# Patient Record
Sex: FEMALE | Race: WHITE | Employment: OTHER | URBAN - METROPOLITAN AREA
[De-identification: names, ages, dates, MRNs, and addresses within clinical notes are randomized per-mention and may not be internally consistent; named-entity substitution may affect disease eponyms.]

---

## 2017-10-03 ENCOUNTER — HOSPITAL ENCOUNTER (OUTPATIENT)
Dept: MAMMOGRAPHY | Facility: CLINIC | Age: 57
Discharge: HOME | End: 2017-10-03
Attending: OBSTETRICS & GYNECOLOGY | Admitting: OBSTETRICS & GYNECOLOGY
Payer: COMMERCIAL

## 2017-10-03 DIAGNOSIS — Z12.31 VISIT FOR SCREENING MAMMOGRAM: ICD-10-CM

## 2017-10-03 PROCEDURE — 77063 BREAST TOMOSYNTHESIS BI: CPT

## 2017-10-10 ENCOUNTER — OFFICE VISIT (OUTPATIENT)
Dept: FAMILY MEDICINE | Facility: CLINIC | Age: 57
End: 2017-10-10

## 2017-10-10 VITALS
HEART RATE: 64 BPM | HEIGHT: 64 IN | TEMPERATURE: 98.1 F | RESPIRATION RATE: 20 BRPM | WEIGHT: 149.8 LBS | SYSTOLIC BLOOD PRESSURE: 126 MMHG | BODY MASS INDEX: 25.57 KG/M2 | DIASTOLIC BLOOD PRESSURE: 78 MMHG

## 2017-10-10 DIAGNOSIS — Z11.59 NEED FOR HEPATITIS C SCREENING TEST: ICD-10-CM

## 2017-10-10 DIAGNOSIS — Z23 NEED FOR VACCINATION: ICD-10-CM

## 2017-10-10 DIAGNOSIS — Z00.8 ENCOUNTER FOR BIOMETRIC SCREENING: Primary | ICD-10-CM

## 2017-10-10 PROCEDURE — 99213 OFFICE O/P EST LOW 20 MIN: CPT | Mod: 25 | Performed by: PHYSICIAN ASSISTANT

## 2017-10-10 PROCEDURE — 90686 IIV4 VACC NO PRSV 0.5 ML IM: CPT | Performed by: PHYSICIAN ASSISTANT

## 2017-10-10 PROCEDURE — 80061 LIPID PANEL: CPT | Mod: 90 | Performed by: PHYSICIAN ASSISTANT

## 2017-10-10 PROCEDURE — 36415 COLL VENOUS BLD VENIPUNCTURE: CPT | Performed by: PHYSICIAN ASSISTANT

## 2017-10-10 PROCEDURE — 86803 HEPATITIS C AB TEST: CPT | Mod: 90 | Performed by: PHYSICIAN ASSISTANT

## 2017-10-10 PROCEDURE — 80053 COMPREHEN METABOLIC PANEL: CPT | Mod: 90 | Performed by: PHYSICIAN ASSISTANT

## 2017-10-10 PROCEDURE — 90471 IMMUNIZATION ADMIN: CPT | Performed by: PHYSICIAN ASSISTANT

## 2017-10-10 RX ORDER — ESTRADIOL 1 MG/1
0.5 TABLET ORAL DAILY
COMMUNITY
Start: 2017-08-20 | End: 2019-07-25

## 2017-10-10 NOTE — PROGRESS NOTES
"CC: Biometric    History:  Cindy is here today for a biometric screening. Feels that she is doing well, but admits she has gained 10 pounds over the past year. Has been struggling with plantar fasciitis which has made her less active. [place] and has been doing more social activities with bad diet practices. No chest pain, SOB, palpitations, dizziness, headaches.     PMH, MEDICATIONS, ALLERGIES, SOCIAL AND FAMILY HISTORY in EPIC and reviewed by me personally.      ROS negative other than the symptoms noted above in the HPI.        Examination   /78 (BP Location: Left arm, Patient Position: Chair, Cuff Size: Adult Regular)  Pulse 64  Temp 98.1  F (36.7  C) (Oral)  Resp 20  Ht 1.632 m (5' 4.25\")  Wt 67.9 kg (149 lb 12.8 oz)  Breastfeeding? No  BMI 25.51 kg/m2       Constitutional: Sitting comfortably, in no acute distress. Vital signs noted  Cardiovascular:  regular rate and rhythm, no murmurs, clicks, or gallops  Respiratory:  normal respiratory rate and rhythm, lungs clear to auscultation        A/P    ICD-10-CM    1. Encounter for biometric screening Z01.89 VENOUS COLLECTION     Lipid Profile (QUEST)     Comprehensive metabolic panel   2. Need for hepatitis C screening test Z11.59 Hepatits C antibody (QUEST)       DISCUSSION:Cindy is doing well today. Will check fasting labs, as well as Hepatitis C. Encouraged her to lose the 10 pounds that she gained. Will contact her via [employer] with results once available.     follow up visit: As needed    Marce Antony PA-C  [facility]    "

## 2017-10-10 NOTE — NURSING NOTE
Cindy Mota is here for fasting blood work for her biometric screening.  Questioned patient about current smoking habits.  Pt. has never smoked.  PULSE regular  My Chart: active  CLASSIFICATION OF OVERWEIGHT AND OBESITY BY BMI                        Obesity Class           BMI(kg/m2)  Underweight                                    < 18.5  Normal                                         18.5-24.9  Overweight                                     25.0-29.9  OBESITY                     I                  30.0-34.9                             II                 35.0-39.9  EXTREME OBESITY             III                >40                            Patient's  BMI Body mass index is 25.51 kg/(m^2).  http://hin.nhlbi.nih.gov/menuplanner/menu.cgi  Pre-visit planning  Immunizations - up to date  Colonoscopy - is up to date  Mammogram - is up to date  Asthma -   PHQ9 -    ANDREW-7 -

## 2017-10-10 NOTE — MR AVS SNAPSHOT
After Visit Summary   10/10/2017    Cindy Mota    MRN: [id number]           Patient Information     Date Of Birth          1960        Visit Information        Provider Department      10/10/2017 9:00 AM Marce Antony PA-C [facility]        Today's Diagnoses     Encounter for biometric screening    -  1    Need for hepatitis C screening test           Follow-ups after your visit        Follow-up notes from your care team     Return if symptoms worsen or fail to improve.      Who to contact     If you have questions or need follow up information about today's clinic visit or your schedule please contact [facility] directly at [phone].  Normal or non-critical lab and imaging results will be communicated to you by Spotlight.fmhart, letter or phone within 4 business days after the clinic has received the results. If you do not hear from us within 7 days, please contact the clinic through Spot[employer]fm[employer] or phone. If you have a critical or abnormal lab result, we will notify you by phone as soon as possible.  Submit refill requests through [employer] or call your pharmacy and they will forward the refill request to us. Please allow 3 business days for your refill to be completed.          Additional Information About Your Visit        MyChart Information     [employer] gives you secure access to your electronic health record. If you see a primary care provider, you can also send messages to your care team and make appointments. If you have questions, please call your primary care clinic.  If you do not have a primary care provider, please call [phone] and they will assist you.        Care EveryWhere ID     This is your Care EveryWhere ID. This could be used by other organizations to access your [place] medical records  [id number]        Your Vitals Were     Pulse Temperature Respirations Height Breastfeeding? BMI (Body Mass Index)    64 98.1  F  "(36.7  C) (Oral) 20 1.632 m (5' 4.25\") No 25.51 kg/m2       Blood Pressure from Last 3 Encounters:   10/10/17 126/78   09/06/16 110/70   08/30/16 110/70    Weight from Last 3 Encounters:   10/10/17 67.9 kg (149 lb 12.8 oz)   09/06/16 63 kg (139 lb)   08/30/16 62.1 kg (137 lb)              We Performed the Following     Comprehensive metabolic panel     Hepatits C antibody (QUEST)     Lipid Profile (QUEST)     VENOUS COLLECTION        Primary Care Provider Office Phone # Fax #    Mayela Loredo [phone] [address]  [address] [place] [id number]        Equal Access to Services     CARLOS VERGARA : Hadii madeline conklin hadasho Soomaali, waaxda luqadaha, qaybta kaalmada adeegyada, hui cui . So [facility] [phone].    ATENCIÓN: Si habla español, tiene a dueñas disposición servicios gratuitos de asistencia lingüística. Llame al [phone].    We comply with applicable federal civil rights laws and [place] laws. We do not discriminate on the basis of race, color, national origin, age, disability, sex, sexual orientation, or gender identity.            Thank you!     Thank you for choosing [facility]  for your care. Our goal is always to provide you with excellent care. Hearing back from our patients is one way we can continue to improve our services. Please take a few minutes to complete the written survey that you may receive in the mail after your visit with us. Thank you!             Your Updated Medication List - Protect others around you: Learn how to safely use, store and throw away your medicines at www.disposemymeds.org.          This list is accurate as of: 10/10/17  9:42 AM.  Always use your most recent med list.                   Brand Name Dispense Instructions for use Diagnosis    estradiol 1 MG tablet    ESTRACE     Take 0.5 mg by mouth daily          "

## 2017-10-11 LAB
ALBUMIN SERPL-MCNC: 4.2 G/DL (ref 3.6–5.1)
ALBUMIN/GLOB SERPL: 1.8 (CALC) (ref 1–2.5)
ALP SERPL-CCNC: 51 U/L (ref 33–130)
ALT SERPL-CCNC: 14 U/L (ref 6–29)
AST SERPL-CCNC: 19 U/L (ref 10–35)
BILIRUB SERPL-MCNC: 0.6 MG/DL (ref 0.2–1.2)
BUN SERPL-MCNC: 12 MG/DL (ref 7–25)
BUN/CREATININE RATIO: NORMAL (CALC) (ref 6–22)
CALCIUM SERPL-MCNC: 9.2 MG/DL (ref 8.6–10.4)
CHLORIDE SERPLBLD-SCNC: 106 MMOL/L (ref 98–110)
CHOLEST SERPL-MCNC: 207 MG/DL
CHOLEST/HDLC SERPL: 2.2 (CALC)
CO2 SERPL-SCNC: 25 MMOL/L (ref 20–31)
CREAT SERPL-MCNC: 0.66 MG/DL (ref 0.5–1.05)
EGFR AFRICAN AMERICAN - QUEST: 114 ML/MIN/1.73M2
GFR SERPL CREATININE-BSD FRML MDRD: 99 ML/MIN/1.73M2
GLOBULIN, CALCULATED - QUEST: 2.3 G/DL (CALC) (ref 1.9–3.7)
GLUCOSE - QUEST: 95 MG/DL (ref 65–99)
HCV AB - QUEST: NORMAL
HDLC SERPL-MCNC: 95 MG/DL
LDLC SERPL CALC-MCNC: 96 MG/DL (CALC)
NONHDLC SERPL-MCNC: 112 MG/DL (CALC)
POTASSIUM SERPL-SCNC: 4.1 MMOL/L (ref 3.5–5.3)
PROT SERPL-MCNC: 6.5 G/DL (ref 6.1–8.1)
SIGNAL TO CUT OFF - QUEST: 0.01
SODIUM SERPL-SCNC: 141 MMOL/L (ref 135–146)
TRIGL SERPL-MCNC: 72 MG/DL

## 2018-04-06 ENCOUNTER — OFFICE VISIT (OUTPATIENT)
Dept: FAMILY MEDICINE | Facility: CLINIC | Age: 58
End: 2018-04-06

## 2018-04-06 VITALS
RESPIRATION RATE: 20 BRPM | HEART RATE: 72 BPM | DIASTOLIC BLOOD PRESSURE: 66 MMHG | HEIGHT: 64 IN | TEMPERATURE: 98.1 F | SYSTOLIC BLOOD PRESSURE: 126 MMHG | WEIGHT: 153 LBS | BODY MASS INDEX: 26.12 KG/M2

## 2018-04-06 DIAGNOSIS — J01.90 ACUTE SINUSITIS WITH SYMPTOMS > 10 DAYS: ICD-10-CM

## 2018-04-06 DIAGNOSIS — N30.00 ACUTE CYSTITIS WITHOUT HEMATURIA: Primary | ICD-10-CM

## 2018-04-06 LAB
ALBUMIN (URINE): ABNORMAL MG/DL
APPEARANCE UR: ABNORMAL
BACTERIA, UR: ABNORMAL
BILIRUB UR QL: ABNORMAL
CASTS/LPF: ABNORMAL
COLOR UR: YELLOW
EP/HPF: ABNORMAL
GLUCOSE URINE: ABNORMAL MG/DL
HGB UR QL: ABNORMAL
KETONES UR QL: ABNORMAL MG/DL
LEUKOCYTE ESTERASE - QUEST: ABNORMAL
MISC.: ABNORMAL
NITRITE UR QL STRIP: ABNORMAL
PH UR STRIP: 7 PH (ref 5–7)
RBC, UR MICRO: ABNORMAL (ref ?–2)
SP. GRAVITY: 1.01
UROBILINOGEN UR QL STRIP: 0.2 EU/DL (ref 0.2–1)
WBC, UR MICRO: ABNORMAL (ref ?–2)

## 2018-04-06 PROCEDURE — 81001 URINALYSIS AUTO W/SCOPE: CPT | Performed by: PHYSICIAN ASSISTANT

## 2018-04-06 PROCEDURE — 99213 OFFICE O/P EST LOW 20 MIN: CPT | Performed by: PHYSICIAN ASSISTANT

## 2018-04-06 RX ORDER — NITROFURANTOIN 25; 75 MG/1; MG/1
100 CAPSULE ORAL 2 TIMES DAILY
Qty: 14 CAPSULE | Refills: 0 | Status: SHIPPED | OUTPATIENT
Start: 2018-04-06 | End: 2018-10-08

## 2018-04-06 NOTE — MR AVS SNAPSHOT
"              After Visit Summary   4/6/2018    Cindy Mota    MRN: [id number]           Patient Information     Date Of Birth          1960        Visit Information        Provider Department      4/6/2018 1:00 PM Aurelia Champion PA [facility]        Today's Diagnoses     Acute cystitis without hematuria    -  1    Acute sinusitis with symptoms > 10 days           Follow-ups after your visit        Who to contact     If you have questions or need follow up information about today's clinic visit or your schedule please contact [facility] directly at [phone].  Normal or non-critical lab and imaging results will be communicated to you by MyChart, letter or phone within 4 business days after the clinic has received the results. If you do not hear from us within 7 days, please contact the clinic through [employer] or phone. If you have a critical or abnormal lab result, we will notify you by phone as soon as possible.  Submit refill requests through [employer] or call your pharmacy and they will forward the refill request to us. Please allow 3 business days for your refill to be completed.          Additional Information About Your Visit        MyChart Information     [employer]uel gives you secure access to your electronic health record. If you see a primary care provider, you can also send messages to your care team and make appointments. If you have questions, please call your primary care clinic.  If you do not have a primary care provider, please call [phone] and they will assist you.        Care EveryWhere ID     This is your Care EveryWhere ID. This could be used by other organizations to access your [place] medical records  [id number]        Your Vitals Were     Pulse Temperature Respirations Height Last Period Breastfeeding?    72 98.1  F (36.7  C) (Oral) 20 1.632 m (5' 4.25\") (LMP Unknown) No    BMI (Body Mass Index)                   " 26.06 kg/m2            Blood Pressure from Last 3 Encounters:   04/06/18 126/66   10/10/17 126/78   09/06/16 110/70    Weight from Last 3 Encounters:   04/06/18 69.4 kg (153 lb)   10/10/17 67.9 kg (149 lb 12.8 oz)   09/06/16 63 kg (139 lb)              We Performed the Following     HCL  Urinalysis, Routine (BFP)          Today's Medication Changes          These changes are accurate as of 4/6/18  2:04 PM.  If you have any questions, ask your nurse or doctor.               Start taking these medicines.        Dose/Directions    nitroFURantoin (macrocrystal-monohydrate) 100 MG capsule   Commonly known as:  MACROBID   Used for:  Acute cystitis without hematuria   Started by:  Aurelia Champion PA        Dose:  100 mg   Take 1 capsule (100 mg) by mouth 2 times daily   Quantity:  14 capsule   Refills:  0            Where to get your medicines      These medications were sent to [facility] [id number] [facility] [address] [facility] [address]  [address] [facility] [address] [facility] [address]     Phone:  [phone]     nitroFURantoin (macrocrystal-monohydrate) 100 MG capsule                Primary Care Provider Office Phone # Fax #    Mayela Loredo [phone] [address] [address]        Equal Access to Services     [facility]: Hadii madeline conklin hadasho Sogallo, waaxda luqadaha, qaybta kaalmada adetierayada, hui cui . So [facility] [phone].    ATENCIÓN: Si habla español, tiene a dueñas disposición servicios gratuitos de asistencia lingüística. Llame al [phone].    We comply with applicable federal civil rights laws and [place] laws. We do not discriminate on the basis of race, color, national origin, age, disability, sex, sexual orientation, or gender identity.            Thank you!     Thank you for choosing [facility]  for your care. Our goal is always to provide you with excellent care. Hearing back from our patients  is one way we can continue to improve our services. Please take a few minutes to complete the written survey that you may receive in the mail after your visit with us. Thank you!             Your Updated Medication List - Protect others around you: Learn how to safely use, store and throw away your medicines at www.disposemymeds.org.          This list is accurate as of 4/6/18  2:04 PM.  Always use your most recent med list.                   Brand Name Dispense Instructions for use Diagnosis    estradiol 1 MG tablet    ESTRACE     Take 0.5 mg by mouth daily        nitroFURantoin (macrocrystal-monohydrate) 100 MG capsule    MACROBID    14 capsule    Take 1 capsule (100 mg) by mouth 2 times daily    Acute cystitis without hematuria

## 2018-04-06 NOTE — PROGRESS NOTES
"Chief Complaint   Patient presents with     UTI     SUBJECTIVE  Cindy Mota in for a possible UTI.  Symptoms started 2 day(s) ago. Symptoms include  dysuria and cloudiness  She denies back pain, nausea, vomiting and fever. She does not have a history of uti's.  She denies any unusual vaginal discharge or odor.     She is sexually active.     Recent abx use? No  Flank pain?  No    OTC treatment? Ibuprofen    Other concerns: 2 weeks of URI and sinus sx.  Sinus congestion improving but today has frontal sinus pain  OTC has been taking Sudafed  Denies fevers    Current Outpatient Prescriptions   Medication     nitroFURantoin, macrocrystal-monohydrate, (MACROBID) 100 MG capsule     estradiol (ESTRACE) 1 MG tablet     No current facility-administered medications for this visit.        Patient Active Problem List    Diagnosis Date Noted     ACP (advance care planning) 06/04/2012     Priority: Medium     Advance Care Planning 5/6/2016: ACP Review of Chart / Resources Provided:  Reviewed chart for advance care plan.  Cindy Mota has no plan or code status on file. Discussed available resources and provided with information. Confirmed code status reflects current choices pending further ACP discussions.  Confirmed/documented legally designated decision makers.  Added by Humera Gaitan                   Symptomatic menopausal or female climacteric states 06/04/2012     Priority: Medium     Osteoarthritis (arthritis due to wear and tear of joints) 06/04/2012     Priority: Medium     Family history of first degree relative with dementia 06/04/2012     Priority: Medium     Family history of cardiovascular disease 06/04/2012     Priority: Medium     CARDIOVASCULAR SCREENING; LDL GOAL LESS THAN 160 10/31/2010     Priority: Medium     Health Care Home 11/26/2013     Priority: Low       OBJECTIVE:    /66  Pulse 72  Temp 98.1  F (36.7  C) (Oral)  Resp 20  Ht 1.632 m (5' 4.25\")  Wt 69.4 kg (153 lb)  LMP  (LMP " Unknown)  Breastfeeding? No  BMI 26.06 kg/m2    Patient is a 58 year old female, in no acute distress. Vitals noted   Head: Normocephalic, atraumatic.  Eyes: Conjunctiva clear, no discharge  Ears: External ears and TMs normal BL.  Nose: Nasal mucosa pink and moist. No discharge.  Mouth / Throat: Mucous membranes moist. Normal dentition.  Pharynx non-erythematous, no exudates.   Neck: Supple, No thyromegaly or nodules. No lymphadenopathy.  Cardiac:  Normal rhythm and rate, no murmurs, rubs or gallops.  Lungs: clear to auscultation bilaterally; no wheezes, crackles or rhonchi  Abdomen:  Bowel sounds normal. Soft,  not distended, no masses, no hepatosplenomegaly, non-tender.  There is not CVA tenderness.    Urinalysis:  Labs Resulted Today:   Results for orders placed or performed in visit on 04/06/18   HCL  Urinalysis, Routine (BFP)   Result Value Ref Range    Color Urine Yellow     Appearance Urine Cloudy     Glucose Urine Neg neg mg/dL    Bilirubin Urine Neg neg    Ketones Urine Neg neg mg/dL    Specific Gravity 1.015     Blood Urine Small (A) neg    pH Urine 7.0 5.0 - 7.0 pH    Albumin Urine Neg neg - neg mg/dL    Urobilinogen Urine 0.2 0.2 - 1.0 EU/dL    Nitrite Urine Neg NEG    Leukocyte Esterase Large (A) neg - neg    Wbc, Urine Micro 20-30 (A) neg - 2    RBC Micro Urine 3-7 (A) neg - 2    EP/HPF Mod     Bacteria Urine Large (A) neg - neg    Casts/LPF Neg     Miscellaneous Neg          Culture pending?  No      ASSESSMENT/PLAN:      (N30.00) Acute cystitis without hematuria  (primary encounter diagnosis)  Plan: HCL  Urinalysis, Routine (BFP), nitroFURantoin,        macrocrystal-monohydrate, (MACROBID) 100 MG         capsule    Macrobid 100 mg bid x 7 days.  AE of the headache, GI disturbance, loss of appetite and change in urine color discussed.    Push fluids, frequent voiding, acetominophen/ibuprofen prn as tolerated.  Call or return to clinic prn if these symptoms worsen or fail to improve as  anticipated.  Repeat UA indicated: No      (J01.90) Acute sinusitis with symptoms > 10 days  Pt to continue Sudafed, Add Vicks  I did offer to rx abx to cover for sinus and UTI, she declined  Will call me in a couple weeks if sinus sx continue.      Cindy Mota understands and agrees with the plan.  Aurelia Champion

## 2018-04-12 ENCOUNTER — MYC MEDICAL ADVICE (OUTPATIENT)
Dept: FAMILY MEDICINE | Facility: CLINIC | Age: 58
End: 2018-04-12

## 2018-04-12 DIAGNOSIS — N30.00 ACUTE CYSTITIS WITHOUT HEMATURIA: Primary | ICD-10-CM

## 2018-04-12 PROCEDURE — 87086 URINE CULTURE/COLONY COUNT: CPT | Performed by: PHYSICIAN ASSISTANT

## 2018-04-12 PROCEDURE — 81001 URINALYSIS AUTO W/SCOPE: CPT | Performed by: PHYSICIAN ASSISTANT

## 2018-04-13 DIAGNOSIS — R30.0 DYSURIA: Primary | ICD-10-CM

## 2018-04-13 LAB
ALBUMIN (URINE): ABNORMAL MG/DL
APPEARANCE UR: ABNORMAL
BACTERIA, UR: ABNORMAL
BILIRUB UR QL: ABNORMAL
CASTS/LPF: ABNORMAL
COLOR UR: YELLOW
EP/HPF: ABNORMAL
GLUCOSE URINE: ABNORMAL MG/DL
HGB UR QL: ABNORMAL
KETONES UR QL: ABNORMAL MG/DL
LEUKOCYTE ESTERASE - QUEST: ABNORMAL
MISC.: ABNORMAL
NITRITE UR QL STRIP: ABNORMAL
PH UR STRIP: 5.5 PH (ref 5–7)
RBC, UR MICRO: ABNORMAL (ref ?–2)
SP. GRAVITY: 1.02
UROBILINOGEN UR QL STRIP: 0.2 EU/DL (ref 0.2–1)
WBC, UR MICRO: ABNORMAL (ref ?–2)

## 2018-04-13 RX ORDER — CIPROFLOXACIN 500 MG/1
500 TABLET, FILM COATED ORAL 2 TIMES DAILY
Qty: 14 TABLET | Refills: 0 | Status: SHIPPED | OUTPATIENT
Start: 2018-04-13 | End: 2018-10-08

## 2018-04-13 NOTE — TELEPHONE ENCOUNTER
See UA    ordered   Switch to Cipro  Black box warning advised  Push water    Aurelia Champion PA-C  4/13/2018

## 2018-04-13 NOTE — TELEPHONE ENCOUNTER
From: Cindy Mota  To: Aurelia Champion PA  Sent: 4/12/2018 9:31 PM CDT  Subject: A follow-up question for a visit within the past seven days    David Moses -     I am still suspicious that the UTI that I saw you about last week is still hanging around. Should I stop by on Friday and leave another specimen?    Cindy

## 2018-04-15 LAB — URINE VOIDED CULTURE: NORMAL

## 2018-10-08 ENCOUNTER — OFFICE VISIT (OUTPATIENT)
Dept: FAMILY MEDICINE | Facility: CLINIC | Age: 58
End: 2018-10-08

## 2018-10-08 VITALS
SYSTOLIC BLOOD PRESSURE: 124 MMHG | BODY MASS INDEX: 25.06 KG/M2 | HEART RATE: 87 BPM | DIASTOLIC BLOOD PRESSURE: 76 MMHG | HEIGHT: 65 IN | WEIGHT: 150.4 LBS | OXYGEN SATURATION: 98 % | RESPIRATION RATE: 16 BRPM | TEMPERATURE: 97.3 F

## 2018-10-08 DIAGNOSIS — Z82.49 FAMILY HISTORY OF CARDIOVASCULAR DISEASE: ICD-10-CM

## 2018-10-08 DIAGNOSIS — Z23 NEED FOR VACCINATION: ICD-10-CM

## 2018-10-08 DIAGNOSIS — Z13.220 SCREENING CHOLESTEROL LEVEL: ICD-10-CM

## 2018-10-08 DIAGNOSIS — Z02.9 ADMINISTRATIVE ENCOUNTER: Primary | ICD-10-CM

## 2018-10-08 DIAGNOSIS — Z13.1 SCREENING FOR DIABETES MELLITUS: ICD-10-CM

## 2018-10-08 DIAGNOSIS — K21.9 GASTROESOPHAGEAL REFLUX DISEASE WITHOUT ESOPHAGITIS: ICD-10-CM

## 2018-10-08 LAB
% GRANULOCYTES: 59.1 %
GLUCOSE SERPL-MCNC: 94 MG/DL (ref 60–99)
HCT VFR BLD AUTO: 40.9 % (ref 35–47)
HEMOGLOBIN: 13.7 G/DL (ref 11.7–15.7)
LYMPHOCYTES NFR BLD AUTO: 29.9 %
MCH RBC QN AUTO: 29.8 PG (ref 26–33)
MCHC RBC AUTO-ENTMCNC: 33.5 G/DL (ref 31–36)
MCV RBC AUTO: 89 FL (ref 78–100)
MONOCYTES NFR BLD AUTO: 11 %
PLATELET COUNT - QUEST: 262 10^9/L (ref 150–375)
RBC # BLD AUTO: 4.59 10*12/L (ref 3.8–5.2)
WBC # BLD AUTO: 4.5 10*9/L (ref 4–11)

## 2018-10-08 PROCEDURE — 36415 COLL VENOUS BLD VENIPUNCTURE: CPT | Performed by: FAMILY MEDICINE

## 2018-10-08 PROCEDURE — 99396 PREV VISIT EST AGE 40-64: CPT | Mod: 25 | Performed by: FAMILY MEDICINE

## 2018-10-08 PROCEDURE — 90471 IMMUNIZATION ADMIN: CPT | Performed by: FAMILY MEDICINE

## 2018-10-08 PROCEDURE — 82947 ASSAY GLUCOSE BLOOD QUANT: CPT | Performed by: FAMILY MEDICINE

## 2018-10-08 PROCEDURE — 85025 COMPLETE CBC W/AUTO DIFF WBC: CPT | Performed by: FAMILY MEDICINE

## 2018-10-08 PROCEDURE — 80061 LIPID PANEL: CPT | Mod: 90 | Performed by: FAMILY MEDICINE

## 2018-10-08 PROCEDURE — 90686 IIV4 VACC NO PRSV 0.5 ML IM: CPT | Performed by: FAMILY MEDICINE

## 2018-10-08 NOTE — PATIENT INSTRUCTIONS
Taking omeprazole daily    Multivitamin daily  B12 1000 mgm  Vit D 1000 international units   Read life style change handout      Women's Health Initiative reviewed.

## 2018-10-08 NOTE — NURSING NOTE
Patient is here for a full physical exam.    Pre-Visit Screening :  Immunizations : up to date-flu shot today  Colon Screening : is up to date  Mammogram: Pt is scheduling next week  Asthma Action Test/Plan : Na  PHQ9 :  PHQ 2 done today  GAD7 :  No concern  Patient's  BMI Body mass index is 25.42 kg/(m^2).  Questioned patient about current smoking habits.  Pt. has never smoked.  OK to leave a detailed voice message regarding today's visit Yes, phone # [phone]      ETOH screening:  Questions:  1-How often do you have a drink containing alcohol?                             3 times per week  2-How many drinks containing alcohol do you have on a typical day when you are         Drinking?                              2   3- How often do you have 5 or more drinks on one occasion?                              Never

## 2018-10-08 NOTE — MR AVS SNAPSHOT
After Visit Summary   10/8/2018    Cindy Mota    MRN: [id number]           Patient Information     Date Of Birth          1960        Visit Information        Provider Department      10/8/2018 8:30 AM Mayela Loredo MD [facility]        Today's Diagnoses     Administrative encounter    -  1    Gastroesophageal reflux disease without esophagitis        Family history of cardiovascular disease        Screening for diabetes mellitus        Screening cholesterol level        Need for vaccination          Care Instructions    Taking omeprazole daily    Multivitamin daily  B12 1000 mgm  Vit D 1000 international units   Read life style change handout      Women's Health Initiative reviewed.            Follow-ups after your visit        Follow-up notes from your care team     Return in about 1 year (around 10/8/2019), or if symptoms worsen or fail to improve.      Who to contact     If you have questions or need follow up information about today's clinic visit or your schedule please contact [facility] directly at [phone].  Normal or non-critical lab and imaging results will be communicated to you by Vol[employer] Technologyhart, letter or phone within 4 business days after the clinic has received the results. If you do not hear from us within 7 days, please contact the clinic through [employer] or phone. If you have a critical or abnormal lab result, we will notify you by phone as soon as possible.  Submit refill requests through [employer] or call your pharmacy and they will forward the refill request to us. Please allow 3 business days for your refill to be completed.          Additional Information About Your Visit        Volta[employer] Technologyhart Information     Grubster gives you secure access to your electronic health record. If you see a primary care provider, you can also send messages to your care team and make appointments. If you have questions, please call your primary care  "clinic.  If you do not have a primary care provider, please call [phone] and they will assist you.        Care EveryWhere ID     This is your Care EveryWhere ID. This could be used by other organizations to access your [place] medical records  [id number]        Your Vitals Were     Pulse Temperature Respirations Height Last Period Pulse Oximetry    87 97.3  F (36.3  C) (Oral) 16 1.638 m (5' 4.5\") (LMP Unknown) 98%    BMI (Body Mass Index)                   25.42 kg/m2            Blood Pressure from Last 3 Encounters:   10/08/18 124/76   04/06/18 126/66   10/10/17 126/78    Weight from Last 3 Encounters:   10/08/18 68.2 kg (150 lb 6.4 oz)   04/06/18 69.4 kg (153 lb)   10/10/17 67.9 kg (149 lb 12.8 oz)              We Performed the Following     AUTO HEMOGRAM/PLATE/DIFF [08807.000]     Glucose Fasting (BFP)     HC FLU VAC PRESRV FREE QUAD SPLIT VIR 3+YRS IM     LIPID PANEL     VACCINE ADMINISTRATION, INITIAL     VENIPUNC FNGR,HEEL,EAR [[id number]]        Primary Care Provider Office Phone # Fax #    Mayela Loredo [phone]       [facility] [address] [address]        Equal Access to Services     CARLOS VERGARA : Hadii aad ku hadasho Soomaali, waaxda luqadaha, qaybta kaalmada adeegyada, hui desir. So [facility] [phone].    ATENCIÓN: Si habla español, tiene a dueñas disposición servicios gratuitos de asistencia lingüística. Llame al [phone].    We comply with applicable federal civil rights laws and [place] laws. We do not discriminate on the basis of race, color, national origin, age, disability, sex, sexual orientation, or gender identity.            Thank you!     Thank you for choosing [facility]  for your care. Our goal is always to provide you with excellent care. Hearing back from our patients is one way we can continue to improve our services. Please take a few minutes to complete the written survey that you may " receive in the mail after your visit with us. Thank you!             Your Updated Medication List - Protect others around you: Learn how to safely use, store and throw away your medicines at www.disposemymeds.org.          This list is accurate as of 10/8/18  9:07 AM.  Always use your most recent med list.                   Brand Name Dispense Instructions for use Diagnosis    estradiol 1 MG tablet    ESTRACE     Take 0.5 mg by mouth daily

## 2018-10-08 NOTE — PROGRESS NOTES
"SUBJECTIVE:  Cindy Mota is an 58 year old  postmenopausal woman   who presents for a biometric screening exam/ followed by GYN specialists exam. Menopause at age 51. No   bleeding, spotting, or discharge noted.     Estrogen replacement therapy: continuous daily oral regimen tapering off  History of abnormal Pap smear: No  Family history of uterine or ovarian cancer: No  Regular self breast exam: Yes  History of abnormal mammogram: No  Family history of breast cancer: No  History of abnormal lipids: No    Past Medical History:  No date: IBS (irritable bowel syndrome)      Family History    Neurologic Disorder Mother 54    Comment: Pick's Disease    Prostate Cancer Father     Arthritis Father     C.A.D. Father 63    Comment: stent    Hypertension Father     Lipids Father     Lipids Sister        Past Surgical History:  2012: GYN EXAM, EST PT., ANNUAL  2010: HC COLONOSCOPY THRU STOMA, DIAGNOSTIC     Comment: normal, repeat in 10 years  2010: HC LAPAROSCOPY, SURGICAL; CHOLECYSTECTOMY     Comment: Cholecystectomy, Laparoscopic  age 33: HC TOOTH EXTRACTION W/FORCEP     Comment: wisdom  May 2007: HYSTERECTOMY, PAP NO LONGER INDICATED     Comment: Ovaries are in  2012: MAMMO SALVADOR BILATERAL     Comment: [facility]    Current Outpatient Prescriptions:  estradiol (ESTRACE) 1 MG tablet   No current facility-administered medications for this visit.    -- Bactrim        Smoking status: Never Smoker    Smokeless tobacco: Never Used    Alcohol use Yes    Comment: Socially       Review Of Systems  Ears/Nose/Throat: negative  Respiratory: No shortness of breath, dyspnea on exertion, cough, or hemoptysis  Cardiovascular: negative  Gastrointestinal: heartburn on omeprazole daily  Genitourinary: negative    OBJECTIVE:  /76 (BP Location: Right arm, Patient Position: Sitting, Cuff Size: Adult Large)  Pulse 87  Temp 97.3  F (36.3  C) (Oral)  Ht 1.638 m (5' 4.5\")  Wt 68.2 kg (150 lb 6.4 oz)  LMP  (LMP " Unknown)  SpO2 98%  BMI 25.42 kg/m2  General appearance: healthy, alert, no distress, cooperative and smiling  Skin: Skin color, texture, turgor normal. No rashes or lesions.  Ears: negative  Nose/Sinuses: Nares normal. Septum midline. Mucosa normal. No drainage or sinus tenderness.  Oropharynx: Lips, mucosa, and tongue normal. Teeth and gums normal.  Neck: Neck supple. No adenopathy. Thyroid symmetric, normal size,, Carotids without bruits.  Lungs: negative, Percussion normal. Good diaphragmatic excursion. Lungs clear  Heart: negative, PMI normal. No lifts, heaves, or thrills. RRR. No murmurs, clicks gallops or rub  Breasts: deferred  Abdomen: Abdomen soft, non-tender. BS normal. No masses, organomegaly  Pelvic: Deferred  BMI : Body mass index is 25.42 kg/(m^2).    ASSESSMENT:  (Z02.9) Administrative encounter  (primary encounter diagnosis)  Comment: await labs  Plan: VENIPUNC FNGR,HEEL,EAR [40237], AUTO         HEMOGRAM/PLATE/DIFF [59763.000]        Total calcium intake of 1500 mgm/day, vitamin D 400-800IU/day and a regular weight bearing exercise program for prevention of osteoporosis is recommended treatment at this time.    Discussed her use of omeprazole OTC. Multivitamin, Vit B12 and D, read lifestyle changes handout      (Z82.49) Family history of cardiovascular disease  Plan: I have reviewed the patient's medical history in detail and updated the computerized patient record.      (Z13.1) Screening for diabetes mellitus  Plan: VENIPUNC FNGR,HEEL,EAR [27788], Glucose Fasting        (BFP)            (Z13.220) Screening cholesterol level  Plan: VENIPUNC FNGR,HEEL,EAR [03866], LIPID PANEL            (Z23) Need for vaccination  Plan: HC FLU VAC PRESRV FREE QUAD SPLIT VIR 3+YRS IM,        VACCINE ADMINISTRATION, INITIAL              PE:  Reviewed health maintenance including diet, regular exercise,   estrogen replacement and periodic exams.

## 2018-10-09 LAB
CHOLEST SERPL-MCNC: 187 MG/DL
CHOLEST/HDLC SERPL: 2.1 (CALC)
HDLC SERPL-MCNC: 87 MG/DL
LDLC SERPL CALC-MCNC: 82 MG/DL (CALC)
NONHDLC SERPL-MCNC: 100 MG/DL (CALC)
TRIGL SERPL-MCNC: 86 MG/DL

## 2018-10-29 ENCOUNTER — HOSPITAL ENCOUNTER (OUTPATIENT)
Dept: MAMMOGRAPHY | Facility: CLINIC | Age: 58
Discharge: HOME | End: 2018-10-29
Attending: OBSTETRICS & GYNECOLOGY | Admitting: OBSTETRICS & GYNECOLOGY
Payer: COMMERCIAL

## 2018-10-29 DIAGNOSIS — Z12.31 VISIT FOR SCREENING MAMMOGRAM: ICD-10-CM

## 2018-10-29 PROCEDURE — 77067 SCR MAMMO BI INCL CAD: CPT

## 2019-07-25 ENCOUNTER — OFFICE VISIT (OUTPATIENT)
Dept: FAMILY MEDICINE | Facility: CLINIC | Age: 59
End: 2019-07-25

## 2019-07-25 VITALS
SYSTOLIC BLOOD PRESSURE: 121 MMHG | BODY MASS INDEX: 24.5 KG/M2 | WEIGHT: 145 LBS | HEART RATE: 75 BPM | RESPIRATION RATE: 20 BRPM | DIASTOLIC BLOOD PRESSURE: 80 MMHG | OXYGEN SATURATION: 99 %

## 2019-07-25 DIAGNOSIS — K29.00 ACUTE GASTRITIS WITHOUT HEMORRHAGE, UNSPECIFIED GASTRITIS TYPE: ICD-10-CM

## 2019-07-25 DIAGNOSIS — N95.1 MENOPAUSAL SYNDROME (HOT FLASHES): Primary | ICD-10-CM

## 2019-07-25 PROCEDURE — 99213 OFFICE O/P EST LOW 20 MIN: CPT | Performed by: FAMILY MEDICINE

## 2019-07-25 RX ORDER — GABAPENTIN 100 MG/1
300 CAPSULE ORAL AT BEDTIME
Qty: 270 CAPSULE | Refills: 1 | Status: SHIPPED | OUTPATIENT
Start: 2019-07-25 | End: 2019-10-24

## 2019-07-25 ASSESSMENT — ANXIETY QUESTIONNAIRES
2. NOT BEING ABLE TO STOP OR CONTROL WORRYING: NOT AT ALL
7. FEELING AFRAID AS IF SOMETHING AWFUL MIGHT HAPPEN: NOT AT ALL
GAD7 TOTAL SCORE: 0
6. BECOMING EASILY ANNOYED OR IRRITABLE: NOT AT ALL
3. WORRYING TOO MUCH ABOUT DIFFERENT THINGS: NOT AT ALL
5. BEING SO RESTLESS THAT IT IS HARD TO SIT STILL: NOT AT ALL
1. FEELING NERVOUS, ANXIOUS, OR ON EDGE: NOT AT ALL
IF YOU CHECKED OFF ANY PROBLEMS ON THIS QUESTIONNAIRE, HOW DIFFICULT HAVE THESE PROBLEMS MADE IT FOR YOU TO DO YOUR WORK, TAKE CARE OF THINGS AT HOME, OR GET ALONG WITH OTHER PEOPLE: NOT DIFFICULT AT ALL

## 2019-07-25 ASSESSMENT — PATIENT HEALTH QUESTIONNAIRE - PHQ9
5. POOR APPETITE OR OVEREATING: NOT AT ALL
SUM OF ALL RESPONSES TO PHQ QUESTIONS 1-9: 2

## 2019-07-25 NOTE — NURSING NOTE
Chief Complaint   Patient presents with     Menopausal Sx     has been having hot flashes for ten years, was taking HRT for awhile until daughter was dianosed with breast cancer, now having hot flashes every 60-90 minutes everyday, wanting to take Zoloft again     Pre-visit Screening:  Immunizations:  up to date  Colonoscopy:  is up to date  Mammogram: is up to date  Asthma Action Test/Plan:  NA  PHQ9:  Given today   GAD7:  Given today   Questioned patient about current smoking habits Pt. has never smoked.  Ok to leave detailed message on voice mail for today's visit only yes, phone # [phone]

## 2019-07-25 NOTE — PROGRESS NOTES
SUBJECTIVE:  59 year old female presents with the following concern:    Daughter diagnosed with breast cancer at age 32  She had been on hormone replacement for ten years and stopped it immediately- no taper  She is currently bothered with :   Hot flashes every 90 minutes  Brain fuzz  interrupted sleep (taking Benadryl at bedtime helps)     She is requesting non hormonal treatment of her symptoms:    Had been on sertraline in the past and tolerated the medication well.  While she has some anxiety about her daughters cancers, she feels her mood is pretty normal.  She would take sertraline primarily for menopause symptoms    Other options:  Gabapentin at bedtime    Other concerns:  She has been taking ibuprofen at bedtime for athritic nocturnal pain and has developed stomach aches.  I advised aleve with food at dinner time as an option and start ranitidine at bedtime for gastritis symptoms    Patient Active Problem List   Diagnosis     CARDIOVASCULAR SCREENING; LDL GOAL LESS THAN 160     ACP (advance care planning)     Symptomatic menopausal or female climacteric states     Osteoarthritis (arthritis due to wear and tear of joints)     Family history of first degree relative with dementia     Family history of cardiovascular disease     Health Care Home     Past Surgical History:   Procedure Laterality Date     C GYN EXAM, EST PT., ANNUAL  1/2012     HC COLONOSCOPY THRU STOMA, DIAGNOSTIC  July 2010    normal, repeat in 10 years     HC LAPAROSCOPY, SURGICAL; CHOLECYSTECTOMY  8/2010    Cholecystectomy, Laparoscopic     HC TOOTH EXTRACTION W/FORCEP  age 33    wisdom     HYSTERECTOMY, PAP NO LONGER INDICATED  May 2007    Ovaries are in     MAMMO SALVADOR BILATERAL  1/2012    [facility]     Current Outpatient Medications   Medication     gabapentin (NEURONTIN) 100 MG capsule     ranitidine (ZANTAC) 300 MG tablet     sertraline (ZOLOFT) 50 MG tablet     No current facility-administered medications for this visit.     ROS: 7   point ROS neg other than the symptoms noted above in the HPI.    OBJECTIVE:  /80 (BP Location: Right arm, Patient Position: Sitting, Cuff Size: Adult Regular)   Pulse 75   Resp 20   Wt 65.8 kg (145 lb)   LMP  (LMP Unknown)   SpO2 99%   BMI 24.50 kg/m    ALert and oriented times 3; Coherent speech, normal rate and volume, able to articulate, logical thoughts, able to abstract reason, no tangential thoughts.. Affect is pleasant    ANDREW 7 scores 0  PHQ 9 scores  2      Assessment   (N95.1) Menopausal syndrome (hot flashes)  (primary encounter diagnosis)  Comment:   Plan: sertraline (ZOLOFT) 50 MG tablet, ranitidine         (ZANTAC) 300 MG tablet, gabapentin (NEURONTIN)         100 MG capsule            (K29.00) Acute gastritis without hemorrhage, unspecified gastritis type  Comment:   Plan:         RECHECK in 2 months  More than 50% of visit spent in counseling.

## 2019-07-26 ASSESSMENT — ANXIETY QUESTIONNAIRES: GAD7 TOTAL SCORE: 0

## 2019-10-24 ENCOUNTER — OFFICE VISIT (OUTPATIENT)
Dept: FAMILY MEDICINE | Facility: CLINIC | Age: 59
End: 2019-10-24

## 2019-10-24 VITALS
TEMPERATURE: 98.1 F | SYSTOLIC BLOOD PRESSURE: 120 MMHG | BODY MASS INDEX: 25.18 KG/M2 | DIASTOLIC BLOOD PRESSURE: 80 MMHG | WEIGHT: 149 LBS | OXYGEN SATURATION: 98 % | HEART RATE: 64 BPM

## 2019-10-24 DIAGNOSIS — N95.1 MENOPAUSAL SYNDROME (HOT FLASHES): ICD-10-CM

## 2019-10-24 DIAGNOSIS — F41.1 GENERALIZED ANXIETY DISORDER: ICD-10-CM

## 2019-10-24 DIAGNOSIS — Z13.9 SCREENING FOR CONDITION: ICD-10-CM

## 2019-10-24 DIAGNOSIS — G56.01 CARPAL TUNNEL SYNDROME OF RIGHT WRIST: ICD-10-CM

## 2019-10-24 LAB
BUN SERPL-MCNC: 13 MG/DL (ref 7–25)
BUN/CREATININE RATIO: 15.7 (ref 6–22)
CALCIUM SERPL-MCNC: 10 MG/DL (ref 8.6–10.3)
CHLORIDE SERPLBLD-SCNC: 107.1 MMOL/L (ref 98–110)
CHOLEST SERPL-MCNC: 205 MG/DL (ref 0–199)
CHOLEST/HDLC SERPL: 2 {RATIO} (ref 0–5)
CO2 SERPL-SCNC: 29.1 MMOL/L (ref 20–32)
CREAT SERPL-MCNC: 0.83 MG/DL (ref 0.7–1.18)
ERYTHROCYTE [DISTWIDTH] IN BLOOD BY AUTOMATED COUNT: 12.5 %
GLUCOSE SERPL-MCNC: 98 MG/DL (ref 60–99)
HCT VFR BLD AUTO: 42.1 % (ref 35–47)
HDLC SERPL-MCNC: 88 MG/DL (ref 40–150)
HEMOGLOBIN: 13.6 G/DL (ref 11.7–15.7)
LDLC SERPL CALC-MCNC: 107 MG/DL (ref 0–130)
MCH RBC QN AUTO: 30 PG (ref 26–33)
MCHC RBC AUTO-ENTMCNC: 32.3 G/DL (ref 31–36)
MCV RBC AUTO: 92.8 FL (ref 78–100)
PLATELET COUNT - QUEST: 309 10^9/L (ref 150–375)
POTASSIUM SERPL-SCNC: 4.61 MMOL/L (ref 3.5–5.3)
RBC # BLD AUTO: 4.54 10*12/L (ref 3.8–5.2)
SODIUM SERPL-SCNC: 142.2 MMOL/L (ref 135–146)
TRIGL SERPL-MCNC: 50 MG/DL (ref 0–149)
WBC # BLD AUTO: 7.6 10*9/L (ref 4–11)

## 2019-10-24 PROCEDURE — 36415 COLL VENOUS BLD VENIPUNCTURE: CPT | Performed by: FAMILY MEDICINE

## 2019-10-24 PROCEDURE — 99214 OFFICE O/P EST MOD 30 MIN: CPT | Mod: 25 | Performed by: FAMILY MEDICINE

## 2019-10-24 PROCEDURE — 90686 IIV4 VACC NO PRSV 0.5 ML IM: CPT | Performed by: FAMILY MEDICINE

## 2019-10-24 PROCEDURE — 90471 IMMUNIZATION ADMIN: CPT | Performed by: FAMILY MEDICINE

## 2019-10-24 PROCEDURE — 84443 ASSAY THYROID STIM HORMONE: CPT | Performed by: FAMILY MEDICINE

## 2019-10-24 PROCEDURE — 80048 BASIC METABOLIC PNL TOTAL CA: CPT | Performed by: FAMILY MEDICINE

## 2019-10-24 PROCEDURE — 85027 COMPLETE CBC AUTOMATED: CPT | Performed by: FAMILY MEDICINE

## 2019-10-24 PROCEDURE — 80061 LIPID PANEL: CPT | Performed by: FAMILY MEDICINE

## 2019-10-24 RX ORDER — SERTRALINE HYDROCHLORIDE 100 MG/1
100 TABLET, FILM COATED ORAL DAILY
Qty: 90 TABLET | Refills: 1 | Status: SHIPPED | OUTPATIENT
Start: 2019-10-24 | End: 2020-06-02

## 2019-10-24 RX ORDER — GABAPENTIN 100 MG/1
400 CAPSULE ORAL AT BEDTIME
Qty: 360 CAPSULE | Refills: 1 | Status: SHIPPED | OUTPATIENT
Start: 2019-10-24 | End: 2020-01-27

## 2019-10-24 NOTE — PROGRESS NOTES
SUBJECTIVE:  59 year old female presents for refill of her sertraline and gabapentin for menopausal symptoms  Abruptly stopped her HRT after her daughter was diagnosed with breast cancer at age 32    Update of symptoms:  Anxiety: improved, but still some residual symptoms- she increased her sertraline to 75 mg-  Her daughter has completed chemotherapy- helping relieve her anxiety    Hot flashes: 50 % better-   Wakes up once a night    Sleep:  Still having problems falling asleep but stays asleep  Gabapentin dose increased to 400 mg hs per self     Other concerns: she stopped her ranitidine- offered pepcid ac as an alternative     Wakes up out of her sleep with numbness of her right hand- numbness- nerve pain to her elbow  No hand weakness  Symptoms limited to early morning after sleeping  Suspect carpal tunnel- recommend a trial of wrist splints- if not better neurology consult     Loose stools- ? Sertraline side effect  I recommended a trial of metamucil    Patient Active Problem List   Diagnosis     CARDIOVASCULAR SCREENING; LDL GOAL LESS THAN 160     ACP (advance care planning)     Symptomatic menopausal or female climacteric states     Osteoarthritis (arthritis due to wear and tear of joints)     Family history of first degree relative with dementia     Family history of cardiovascular disease     Health Care Home     Past Surgical History:   Procedure Laterality Date     C GYN EXAM, EST PT., ANNUAL  1/2012     HC COLONOSCOPY THRU STOMA, DIAGNOSTIC  July 2010    normal, repeat in 10 years     HC LAPAROSCOPY, SURGICAL; CHOLECYSTECTOMY  8/2010    Cholecystectomy, Laparoscopic     HC TOOTH EXTRACTION W/FORCEP  age 33    wisdom     HYSTERECTOMY, PAP NO LONGER INDICATED  May 2007    Ovaries are in     MAMMO SALVADOR BILATERAL  1/2012    [facility]     Current Outpatient Medications   Medication     gabapentin (NEURONTIN) 100 MG capsule     sertraline (ZOLOFT) 100 MG tablet     No current facility-administered  medications for this visit.       ROS: 10 point ROS neg other than the symptoms noted above in the HPI.    OBJECTIVE:  /80 (BP Location: Left arm, Patient Position: Sitting, Cuff Size: Adult Large)   Pulse 64   Temp 98.1  F (36.7  C) (Oral)   Wt 67.6 kg (149 lb)   LMP  (LMP Unknown)   SpO2 98%   BMI 25.18 kg/m    ALert and oriented times 3; Coherent speech, normal rate and volume, able to articulate, logical thoughts. Affect is pleasant      Assessment    (N95.1) Menopausal syndrome (hot flashes)  Comment: increase sertraline dose- symptoms improved  Plan: gabapentin (NEURONTIN) 100 MG capsule,         sertraline (ZOLOFT) 100 MG tablet            (G56.01) Carpal tunnel syndrome of right wrist  Comment:   Plan: trial of wrist splints  Neurology consult if continued concerns    (F41.1) Generalized anxiety disorder  Comment:   Plan: sertraline (ZOLOFT) 100 MG tablet            (Z13.9) Screening for condition  Comment: requests blood sork  Plan: Basic Metabolic Panel (BFP), HEMOGRAM/PLATELET         (BFP), Lipid Panel (BFP), TSH with free T4         reflex ([facility]), VENOUS COLLECTION          Recheck in six months if doing well  If still having menopausal symptoms, or concerns about bowel habits, recheck in two months    More than 50% of visit spent in counseling.  20 minute visit            PLAN:  Increase sertraline to 100 mg daily  Continue gabapentin 400 mg

## 2019-10-24 NOTE — NURSING NOTE
Chief Complaint   Patient presents with     Recheck Medication     fasting labs     Pre-visit Screening:  Immunizations:  up to date  Colonoscopy:  NA  Mammogram: NA  Asthma Action Test/Plan:  NA  PHQ9:  NA  GAD7:  NA  Questioned patient about current smoking habits Pt. has never smoked.  Ok to leave detailed message on voice mail for today's visit only yes, phone # [phone]

## 2019-10-25 LAB — TSH SERPL-ACNC: 0.95 MIU/L (ref 0.4–4.5)

## 2019-10-29 ENCOUNTER — TELEPHONE (OUTPATIENT)
Dept: FAMILY MEDICINE | Facility: CLINIC | Age: 59
End: 2019-10-29

## 2019-10-29 DIAGNOSIS — N95.1 SYMPTOMATIC MENOPAUSAL OR FEMALE CLIMACTERIC STATES: Primary | ICD-10-CM

## 2019-10-29 RX ORDER — GABAPENTIN 300 MG/1
300 CAPSULE ORAL AT BEDTIME
Qty: 90 CAPSULE | Refills: 1 | Status: SHIPPED | OUTPATIENT
Start: 2019-10-29 | End: 2020-05-19

## 2019-10-29 RX ORDER — GABAPENTIN 100 MG/1
100 CAPSULE ORAL AT BEDTIME
Qty: 90 CAPSULE | Refills: 1 | Status: SHIPPED | OUTPATIENT
Start: 2019-10-29 | End: 2020-01-27

## 2019-10-29 NOTE — TELEPHONE ENCOUNTER
Received a PA for Gabapentin, need to re-send in her Gabapentin. Please prescribe Gabapentin 300mg + Gabapentin 100mg to equal 400mg at bedtime. Routing to Dr. Kilgore.     Pending Prescriptions:                       Disp   Refills    gabapentin (NEURONTIN) 100 MG capsule                         Sig: Take 1 capsule (100 mg) by mouth At Bedtime    gabapentin (NEURONTIN) 300 MG capsule                         Sig: Take 1 capsule (300 mg) by mouth At Bedtime

## 2019-10-30 ENCOUNTER — HOSPITAL ENCOUNTER (OUTPATIENT)
Dept: MAMMOGRAPHY | Facility: CLINIC | Age: 59
Discharge: HOME | End: 2019-10-30
Attending: OBSTETRICS & GYNECOLOGY | Admitting: OBSTETRICS & GYNECOLOGY
Payer: COMMERCIAL

## 2019-10-30 DIAGNOSIS — Z12.31 VISIT FOR SCREENING MAMMOGRAM: ICD-10-CM

## 2019-10-30 PROCEDURE — 77063 BREAST TOMOSYNTHESIS BI: CPT

## 2020-01-27 ENCOUNTER — OFFICE VISIT (OUTPATIENT)
Dept: FAMILY MEDICINE | Facility: CLINIC | Age: 60
End: 2020-01-27

## 2020-01-27 VITALS
HEART RATE: 81 BPM | WEIGHT: 149.4 LBS | SYSTOLIC BLOOD PRESSURE: 98 MMHG | BODY MASS INDEX: 25.25 KG/M2 | OXYGEN SATURATION: 98 % | TEMPERATURE: 98.2 F | DIASTOLIC BLOOD PRESSURE: 60 MMHG

## 2020-01-27 DIAGNOSIS — R19.7 DIARRHEA, UNSPECIFIED TYPE: ICD-10-CM

## 2020-01-27 DIAGNOSIS — R30.0 DYSURIA: Primary | ICD-10-CM

## 2020-01-27 PROCEDURE — 81001 URINALYSIS AUTO W/SCOPE: CPT | Performed by: FAMILY MEDICINE

## 2020-01-27 PROCEDURE — 87046 STOOL CULTR AEROBIC BACT EA: CPT | Mod: 90 | Performed by: FAMILY MEDICINE

## 2020-01-27 PROCEDURE — 87045 FECES CULTURE AEROBIC BACT: CPT | Mod: 90 | Performed by: FAMILY MEDICINE

## 2020-01-27 PROCEDURE — 87186 SC STD MICRODIL/AGAR DIL: CPT | Mod: 90 | Performed by: FAMILY MEDICINE

## 2020-01-27 PROCEDURE — 87177 OVA AND PARASITES SMEARS: CPT | Mod: 90 | Performed by: FAMILY MEDICINE

## 2020-01-27 PROCEDURE — 87077 CULTURE AEROBIC IDENTIFY: CPT | Mod: 90 | Performed by: FAMILY MEDICINE

## 2020-01-27 PROCEDURE — 87086 URINE CULTURE/COLONY COUNT: CPT | Mod: 90 | Performed by: FAMILY MEDICINE

## 2020-01-27 PROCEDURE — 99213 OFFICE O/P EST LOW 20 MIN: CPT | Performed by: FAMILY MEDICINE

## 2020-01-27 PROCEDURE — 87088 URINE BACTERIA CULTURE: CPT | Mod: 90 | Performed by: FAMILY MEDICINE

## 2020-01-27 PROCEDURE — 87209 SMEAR COMPLEX STAIN: CPT | Mod: 90 | Performed by: FAMILY MEDICINE

## 2020-01-27 PROCEDURE — 87427 SHIGA-LIKE TOXIN AG IA: CPT | Mod: 90 | Performed by: FAMILY MEDICINE

## 2020-01-27 RX ORDER — CIPROFLOXACIN 500 MG/1
500 TABLET, FILM COATED ORAL 2 TIMES DAILY
Qty: 14 TABLET | Refills: 0 | Status: SHIPPED | OUTPATIENT
Start: 2020-01-27 | End: 2020-05-19

## 2020-01-27 NOTE — PROGRESS NOTES
SUBJECTIVE: Cindy Mota is a 59 year old female who complains of urinary urgency and dysuria x 1 days, without flank pain, fever, chills, or abnormal vaginal discharge or bleeding. Pt also noted this last week x 1 and it resolved.    Pt has also noted diarrhea for a little over a week,  Not every day but some days up to 10 episodes, no blood.  Pt has noted some crampy pain prior to diarrhea.     Pt was in [place] 1/7-/1/11 but diarrhea started a week after.  NO other contagious contacts.    Pt has been eating smoothies daily with fruit and veggies since start of year- was using protein powder with collagen, stopped this but diarrhea has not resolved    Pt last colonosocpy 2010-was ultimately found to have gallbladder issues and had cholecystectomy-no ongoing diarrhea afterward    Patient Active Problem List   Diagnosis     CARDIOVASCULAR SCREENING; LDL GOAL LESS THAN 160     ACP (advance care planning)     Symptomatic menopausal or female climacteric states     Osteoarthritis (arthritis due to wear and tear of joints)     Family history of first degree relative with dementia     Family history of cardiovascular disease     Health Care Home     Past Medical History:   Diagnosis Date     IBS (irritable bowel syndrome)      Family History   Problem Relation Age of Onset     Neurologic Disorder Mother 54        Pick's Disease     Prostate Cancer Father      Arthritis Father      C.A.D. Father 63        stent     Hypertension Father      Lipids Father      Lipids Sister      Social History     Socioeconomic History     Marital status:      Spouse name: Not on file     Number of children: 2     Years of education: Not on file     Highest education level: Not on file   Occupational History     Employer: [email or web address]   Social Needs     Financial resource strain: Not on file     Food insecurity:     Worry: Not on file     Inability: Not on file     Transportation needs:     Medical: Not on file     Non-medical:  Not on file   Tobacco Use     Smoking status: Never Smoker     Smokeless tobacco: Never Used   Substance and Sexual Activity     Alcohol use: Yes     Comment: Socially     Drug use: No     Sexual activity: Yes     Partners: Male     Birth control/protection: Surgical     Comment: hysterectomy   Lifestyle     Physical activity:     Days per week: Not on file     Minutes per session: Not on file     Stress: Not on file   Relationships     Social connections:     Talks on phone: Not on file     Gets together: Not on file     Attends Zoroastrian service: Not on file     Active member of club or organization: Not on file     Attends meetings of clubs or organizations: Not on file     Relationship status: Not on file     Intimate partner violence:     Fear of current or ex partner: Not on file     Emotionally abused: Not on file     Physically abused: Not on file     Forced sexual activity: Not on file   Other Topics Concern     Parent/sibling w/ CABG, MI or angioplasty before 65F 55M? Not Asked      Service No     Blood Transfusions No     Caffeine Concern No     Occupational Exposure No     Hobby Hazards No     Sleep Concern No     Stress Concern No     Weight Concern No     Special Diet Yes     Comment: low fat     Back Care No     Exercise Yes     Comment: walks     Bike Helmet Not Asked     Seat Belt Yes     Self-Exams Yes   Social History Narrative     Not on file     Past Surgical History:   Procedure Laterality Date     C GYN EXAM, EST PT., ANNUAL  1/2012     HC COLONOSCOPY THRU STOMA, DIAGNOSTIC  July 2010    normal, repeat in 10 years     HC LAPAROSCOPY, SURGICAL; CHOLECYSTECTOMY  8/2010    Cholecystectomy, Laparoscopic     HC TOOTH EXTRACTION W/FORCEP  age 33    wisdom     HYSTERECTOMY, PAP NO LONGER INDICATED  May 2007    Ovaries are in     MAMMO SALVADOR BILATERAL  1/2012    [facility]     gabapentin (NEURONTIN) 300 MG capsule, Take 1 capsule (300 mg) by mouth At Bedtime  sertraline (ZOLOFT) 100 MG  tablet, Take 1 tablet (100 mg) by mouth daily    No current facility-administered medications on file prior to visit.        Allergies: Bactrim    Immunization History   Administered Date(s) Administered     Influenza (IIV3) PF 11/21/2013, 10/26/2014     Influenza Vaccine IM > 6 months Valent IIV4 10/16/2015, 09/06/2016, 10/10/2017, 10/08/2018, 10/24/2019     TD (ADULT, 7+) 06/18/2010        OBJECTIVE: BP 98/60 (BP Location: Left arm, Patient Position: Sitting, Cuff Size: Adult Regular)   Pulse 81   Temp 98.2  F (36.8  C) (Oral)   Wt 67.8 kg (149 lb 6.4 oz)   LMP  (LMP Unknown)   SpO2 98%   BMI 25.25 kg/m   Appears well, in no apparent distress.  Vital signs are normal. The abdomen is soft without tenderness, guarding, mass, rebound or organomegaly. No CVA tenderness or inguinal adenopathy noted. Urine dipstick shows positive for WBC's, positive for RBC's, positive for leukocytes and positive for bacturia.  Micro exam: 10-25 WBC's per HPF and 2-5 RBC's per HPF.     ASSESSMENT: UTI uncomplicated without evidence of pyelonephritis, diarrhea also present- suspect cross contamination , fistula an outside possibility as well, diarrhea may be unrelated    PLAN:we agree to use cipro, check stool cx and O and P-goal to return with samples prior to abx as this would affect cultures, I reviewed the risks, benefits, and possible side effects of the medication.  The patient had an opportunity to ask any questions regarding the treatment plan. The patient was encouraged to call my office if any problems. Treatment per orders - also push fluids, may use Pyridium OTC prn. Call or return to clinic prn if these symptoms worsen or fail to improve as anticipated.

## 2020-01-27 NOTE — NURSING NOTE
Cindy is here for possible UTI/diarrhea    Pre-visit Screening:  Immunizations:  up to date  Colonoscopy:  is up to date  Mammogram: is up to date  Asthma Action Test/Plan:  MARGARITO  PHQ9:  NA  GAD7:  NA  Questioned patient about current smoking habits Pt. has never smoked.  Ok to leave detailed message on voice mail for today's visit only Yes, phone # [phone]

## 2020-01-30 ENCOUNTER — TELEPHONE (OUTPATIENT)
Dept: FAMILY MEDICINE | Facility: CLINIC | Age: 60
End: 2020-01-30

## 2020-01-30 DIAGNOSIS — R19.7 DIARRHEA, UNSPECIFIED TYPE: Primary | ICD-10-CM

## 2020-01-30 LAB — URINE VOIDED CULTURE: ABNORMAL

## 2020-01-30 NOTE — TELEPHONE ENCOUNTER
Cindy called to say that she is still having loose stools and knows the stool samples came back negative.  She is wondering what to do next to help investigate what could be going on with her since this is not clearing up.  Please call to discuss.    Patient's phone #[phone]    D/w pt, urinary symptoms resolving but now at 10 days diarrhea , no blood    Recommend stop metamucil, push fluids, see if resolves off cipro, GI referral placed to schedule if not better

## 2020-01-31 ENCOUNTER — TELEPHONE (OUTPATIENT)
Dept: DERMATOLOGY | Facility: CLINIC | Age: 60
End: 2020-01-31

## 2020-01-31 LAB
CULTURE - QUEST: NORMAL
CULTURE, STOOL - QUEST: NORMAL
EIA - QUEST: NORMAL
TRICHROME - QUEST: NORMAL

## 2020-01-31 NOTE — TELEPHONE ENCOUNTER
[facility] Call Center    Phone Message    May a detailed message be left on voicemail: yes    Reason for Call: Other: Pt insisted Anthony had to help them schedule her light treatments.     Action Taken: Message routed to:  [facility] dermatology

## 2020-02-03 NOTE — TELEPHONE ENCOUNTER
I called and spoke with Bhavya and have her scheduled for resident cosmetic clinic.    DEONTE Smith

## 2020-02-05 ENCOUNTER — OFFICE VISIT (OUTPATIENT)
Dept: DERMATOLOGY | Facility: CLINIC | Age: 60
End: 2020-02-05
Payer: COMMERCIAL

## 2020-02-05 DIAGNOSIS — I78.1 SPIDER ANGIOMA: Primary | ICD-10-CM

## 2020-02-05 DIAGNOSIS — D18.01 CHERRY ANGIOMA: ICD-10-CM

## 2020-02-05 ASSESSMENT — PAIN SCALES - GENERAL: PAINLEVEL: NO PAIN (0)

## 2020-02-05 NOTE — PROGRESS NOTES
Dermatology Laser Intake Checklist:  History of psoriasis:No  History of recent tan, indoor or outdoor tanning/vacation or other sun exposure: No  History of vitiligo:No  Family history of vitiligo:No  Recent other cosmetic procedure(microderm abrasion/peel/hair removal/facial etc):Yes Eyebrow tattoo  History of HSV:No   Did the patient start valtrex: No  For genital laser hair removal patient only: Is there a history of genital warts or condyloma:No  Tattoo in the area to be treated:No  Is patient using hydroquinone:No  Retinoids and other acne medications stopped for 2 weeks:No  Has the patient had accutane in the last 6-12 months:No  Pregnant or breastfeeding: No  History of skin cancer in area planned for treatment: No  History of treatment with gold:No  Changes in medical history: No  Photos obtained: Yes  Does the patient smoke:No  Is the patient on ibuprofen/aspirin/plavix/coumadin/other blood thinner: No  If patient is taking narcotic or diazepam(valium)-does patient have : No  LMP  (LMP Unknown)

## 2020-02-05 NOTE — NURSING NOTE
Dermatology Rooming Note    Cindy Mota's goals for this visit include:   Chief Complaint   Patient presents with     Laser Treatment     Cindy is here today for PDL for spider Telangiectasia spots on face.      DEONTE Smith

## 2020-02-05 NOTE — LETTER
2/5/2020       RE: Cindy Mota  [address] [address] [facility] [address]     Dear Colleague,    Thank you for referring your patient, Cindy Mota, to the [facility] DERMATOLOGY at [facility]. Please see a copy of my visit note below.    Dermatology Laser Intake Checklist:  History of psoriasis:No  History of recent tan, indoor or outdoor tanning/vacation or other sun exposure: No  History of vitiligo:No  Family history of vitiligo:No  Recent other cosmetic procedure(microderm abrasion/peel/hair removal/facial etc):Yes Eyebrow tattoo  History of HSV:No   Did the patient start valtrex: No  For genital laser hair removal patient only: Is there a history of genital warts or condyloma:No  Tattoo in the area to be treated:No  Is patient using hydroquinone:No  Retinoids and other acne medications stopped for 2 weeks:No  Has the patient had accutane in the last 6-12 months:No  Pregnant or breastfeeding: No  History of skin cancer in area planned for treatment: No  History of treatment with gold:No  Changes in medical history: No  Photos obtained: Yes  Does the patient smoke:No  Is the patient on ibuprofen/aspirin/plavix/coumadin/other blood thinner: No  If patient is taking narcotic or diazepam(valium)-does patient have : No  LMP  (LMP Unknown)         Laser- VBeam(Pulsed Dye Laser) Procedure Note: Cosmetic      Procedure Date: Feb 5, 2020    Attending Staff Surgeon: Dung    Resident Surgeon: Garcia Canela Hirt, Shahwan, [place], Goyal    Assistant: Shayy Evans RN    Operating Room Data:     Surgery/Procedure Date:    SAME     Pre-operative Diagnosis:   Spider angioma and cherry angioma  Location: left upper cutaneous lip and left cheek  Number of lesions: 2    Operation/Procedure    Vbeam pulsed dye laser treatment#: 1       Post-operative Diagnosis:  SAME    Laser Settings:  Energy: 12 J/cm2  Spot size: 3mm  Pulse width:  1.5 mS (0.45 thru 40  mS)  Dynamic cooling spray settin mS  Dynamic cooling device delay:  20 mS      Fotofinder photos: No; photodocumentation taken and placed in chart for future reference    Anesthesia:  None    Description of Operation/Procedure:   The nature and purpose of the procedure, associated risks, possible consequences, complications and alternative methods of treatment were explained in detail, this includes but is not limited to hyperpigmentation, hypopigmentation, scarring, bruising, hair loss pain/discomfort, eye injury, and blister. We reviewed that the outcome could be any of the following: no improvement, slight improvement or change in skin color & texture, the skin might be permanently lighter or darker, and though uncommon, superficial scarring may occur.  Multiple treatments may be recommended.   A photo and operative consent were obtained. Time-out was performed.The patient was positioned to optimally expose the area treated. Protective eyewear was worn by the patient and goggles on all personnel in the treatment room. The patient confirmed the site to be treated. The laser energy output was verified by meter reading.      The clinically evident lesion(s) was/were treated with [place] Vbeam pulsed dye laser (595 nm) beam as above. A total of 5 pulses were used. The patient tolerated the procedure well and no complications were noted. Post operative instructions were provided. The total laser operation and preparation time was 15 minutes.      Resident education session; the patient will not be charged today.    Dr. Razo staffed the patient was present for the entire procedure.        Staff Involved:  Resident/Staff     Staff Physician Comments:   I saw and evaluated the patient with the resident and I agree with the assessment and plan. I was present for the entire laser procedure.     Gianna Razo MD    Department of Dermatology  [facility]  [facility]: Phone: [phone], Fax:[phone]  [facility]: Phone: [phone], Fax: [phone]

## 2020-02-05 NOTE — PATIENT INSTRUCTIONS
Pulse Dye Laser    I will experience redness, swelling, pain, and heat sensation. I may experience bruising, itching, or acne. Risks are blistering, oozing, permanent scarring, hair loss,  temporary or permanent skin lightening or darkening, infection, and eye injury. I understand my outcome could be no improvement, slight improvement. Multiple treatments may be required.    After treatment, Do Not:    Rub, scratch, or put weight on the site for 2 weeks    Wear tight fitting clothing or jewelry over the site    [place]. Keep the site out of sunlight. Use sunscreen of 30 SPF or greater when in the sun. Use sunscreen 30 minutes before going out and reapply if sweating. Tanning decreases the success of the treatment     How do I care for the treated site?    Use ice packs for 10 minutes after the procedure for swelling     If the site is on your face, use ice again 1 hour after treatment    If a scab or crust forms, gently cleanse the site with hydrogen peroxide. Then put on Vaseline  ointment 3 times a day    Do not use makeup on any open wound    What should I expect?    Blue-gray color that may take 2 to 3 weeks to go away    Redness may also last a week or longer    Results may take up to 3 or 4 months after treatment    More procedures may be needed    Who should I call with questions?    [facility]: [phone]     [facility]: [phone]    For urgent needs outside of business hours call the [facility] at [phone] and ask for the dermatology resident on call

## 2020-02-05 NOTE — PROGRESS NOTES
Laser- VBeam(Pulsed Dye Laser) Procedure Note: Cosmetic      Procedure Date: 2020    Attending Staff Surgeon: Dung    Resident Surgeon: Garcia Canela Hirt, Shahwan, [place], Goyal    Assistant: Shayy Evans RN    Operating Room Data:     Surgery/Procedure Date:    SAME     Pre-operative Diagnosis:   Spider angioma and cherry angioma  Location: left upper cutaneous lip and left cheek  Number of lesions: 2    Operation/Procedure    Vbeam pulsed dye laser treatment#: 1       Post-operative Diagnosis:  SAME    Laser Settings:  Energy: 12 J/cm2  Spot size: 3mm  Pulse width:  1.5 mS (0.45 thru 40 mS)  Dynamic cooling spray settin mS  Dynamic cooling device delay:  20 mS      Fotofinder photos: No; photodocumentation taken and placed in chart for future reference    Anesthesia:  None    Description of Operation/Procedure:   The nature and purpose of the procedure, associated risks, possible consequences, complications and alternative methods of treatment were explained in detail, this includes but is not limited to hyperpigmentation, hypopigmentation, scarring, bruising, hair loss pain/discomfort, eye injury, and blister. We reviewed that the outcome could be any of the following: no improvement, slight improvement or change in skin color & texture, the skin might be permanently lighter or darker, and though uncommon, superficial scarring may occur.  Multiple treatments may be recommended.   A photo and operative consent were obtained. Time-out was performed.The patient was positioned to optimally expose the area treated. Protective eyewear was worn by the patient and goggles on all personnel in the treatment room. The patient confirmed the site to be treated. The laser energy output was verified by meter reading.      The clinically evident lesion(s) was/were treated with [place] Vbeam pulsed dye laser (595 nm) beam as above. A total of 5 pulses were used. The patient tolerated the procedure well  and no complications were noted. Post operative instructions were provided. The total laser operation and preparation time was 15 minutes.      Resident education session; the patient will not be charged today.    Dr. Razo staffed the patient was present for the entire procedure.        Staff Involved:  Resident/Staff     Staff Physician Comments:   I saw and evaluated the patient with the resident and I agree with the assessment and plan. I was present for the entire laser procedure.     Gianna Razo MD    Department of Dermatology  [facility]: Phone: [phone], Fax:[phone]  [facility]: Phone: [phone], Fax: [phone]

## 2020-02-17 ENCOUNTER — TRANSFERRED RECORDS (OUTPATIENT)
Dept: FAMILY MEDICINE | Facility: CLINIC | Age: 60
End: 2020-02-17

## 2020-02-21 ENCOUNTER — TRANSFERRED RECORDS (OUTPATIENT)
Dept: FAMILY MEDICINE | Facility: CLINIC | Age: 60
End: 2020-02-21

## 2020-03-01 ENCOUNTER — HEALTH MAINTENANCE LETTER (OUTPATIENT)
Age: 60
End: 2020-03-01

## 2020-03-14 ENCOUNTER — TRANSFERRED RECORDS (OUTPATIENT)
Dept: FAMILY MEDICINE | Facility: CLINIC | Age: 60
End: 2020-03-14

## 2020-05-19 ENCOUNTER — TELEPHONE (OUTPATIENT)
Dept: FAMILY MEDICINE | Facility: CLINIC | Age: 60
End: 2020-05-19

## 2020-05-19 DIAGNOSIS — N95.1 SYMPTOMATIC MENOPAUSAL OR FEMALE CLIMACTERIC STATES: ICD-10-CM

## 2020-05-19 RX ORDER — GABAPENTIN 300 MG/1
300 CAPSULE ORAL AT BEDTIME
Qty: 15 CAPSULE | Refills: 0 | COMMUNITY
Start: 2020-05-19 | End: 2020-06-02

## 2020-05-19 RX ORDER — GABAPENTIN 100 MG/1
100 CAPSULE ORAL DAILY
COMMUNITY
Start: 2020-05-19 | End: 2020-10-22

## 2020-05-19 NOTE — TELEPHONE ENCOUNTER
Scheduled pt appointment with [facility] for 06/02/20. Called pt in 15 days worth of her Gabapentin 300MG to [facility]. She stated she had enough of her other medications.

## 2020-06-02 ENCOUNTER — OFFICE VISIT (OUTPATIENT)
Dept: FAMILY MEDICINE | Facility: CLINIC | Age: 60
End: 2020-06-02

## 2020-06-02 VITALS
SYSTOLIC BLOOD PRESSURE: 128 MMHG | HEIGHT: 64 IN | TEMPERATURE: 97.7 F | DIASTOLIC BLOOD PRESSURE: 80 MMHG | HEART RATE: 68 BPM | WEIGHT: 150.4 LBS | BODY MASS INDEX: 25.68 KG/M2 | RESPIRATION RATE: 20 BRPM

## 2020-06-02 DIAGNOSIS — N95.1 SYMPTOMATIC MENOPAUSAL OR FEMALE CLIMACTERIC STATES: Primary | ICD-10-CM

## 2020-06-02 DIAGNOSIS — F41.1 GENERALIZED ANXIETY DISORDER: ICD-10-CM

## 2020-06-02 DIAGNOSIS — Z86.79 HX OF SUPRAVENTRICULAR TACHYCARDIA: ICD-10-CM

## 2020-06-02 DIAGNOSIS — K52.831 COLLAGENOUS COLITIS: ICD-10-CM

## 2020-06-02 PROCEDURE — 99214 OFFICE O/P EST MOD 30 MIN: CPT | Performed by: FAMILY MEDICINE

## 2020-06-02 RX ORDER — GABAPENTIN 300 MG/1
300 CAPSULE ORAL AT BEDTIME
Qty: 90 CAPSULE | Refills: 1 | Status: SHIPPED | OUTPATIENT
Start: 2020-06-02 | End: 2020-12-09

## 2020-06-02 RX ORDER — BUDESONIDE 3 MG/1
6 CAPSULE, COATED PELLETS ORAL EVERY MORNING
Qty: 180 CAPSULE | Refills: 1 | COMMUNITY
Start: 2020-06-02 | End: 2020-06-03

## 2020-06-02 ASSESSMENT — MIFFLIN-ST. JEOR: SCORE: 1242.21

## 2020-06-02 NOTE — PROGRESS NOTES
Subjective     Cindy Mota is a 60 year old female who presents to clinic today for the following health issues:    HPI   Menopausal symptoms / slight Anxiety Follow-Up    How are you doing with your anxiety since your last visit? No change-has decreased her sertraline to 50 mg as it was felt it might be contributing to colitis issues-pt ws diagnosed with colloid colitis and is doing fine now on bedesonide    Are you having other symptoms that might be associated with anxiety? No    Have you had a significant life event? Colitis, stress with daughter having CA, covid 19     Are you feeling depressed? No    Do you have any concerns with your use of alcohol or other drugs? No    Social History     Tobacco Use     Smoking status: Never Smoker     Smokeless tobacco: Never Used   Substance Use Topics     Alcohol use: Yes     Comment: Socially     Drug use: No     ANDREW-7 SCORE 7/25/2019   Total Score 0     PHQ 6/27/2016 7/25/2019   PHQ-9 Total Score 0 2   Q9: Thoughts of better off dead/self-harm past 2 weeks Not at all Not at all     Last PHQ-9 7/25/2019   1.  Little interest or pleasure in doing things 0   2.  Feeling down, depressed, or hopeless 0   3.  Trouble falling or staying asleep, or sleeping too much 2   4.  Feeling tired or having little energy 0   5.  Poor appetite or overeating 0   6.  Feeling bad about yourself 0   7.  Trouble concentrating 0   8.  Moving slowly or restless 0   Q9: Thoughts of better off dead/self-harm past 2 weeks 0   PHQ-9 Total Score 2   Difficulty at work, home, or with people Not difficult at all     ANDREW-7  7/25/2019   1. Feeling nervous, anxious, or on edge 0   2. Not being able to stop or control worrying 0   3. Worrying too much about different things 0   4. Trouble relaxing 0   5. Being so restless that it is hard to sit still 0   6. Becoming easily annoyed or irritable 0   7. Feeling afraid, as if something awful might happen 0   ANDREW-7 Total Score 0   If you checked any  problems, how difficult have they made it for you to do your work, take care of things at home, or get along with other people? Not difficult at all         How many servings of fruits and vegetables do you eat daily?      On average, how many sweetened beverages do you drink each day (Examples: soda, juice, sweet tea, etc.  Do NOT count diet or artificially sweetened beverages)?   1    How many days per week do you exercise enough to make your heart beat faster? 4    How many minutes a day do you exercise enough to make your heart beat faster? 20 - 29    How many days per week do you miss taking your medication? 0        Patient Active Problem List   Diagnosis     CARDIOVASCULAR SCREENING; LDL GOAL LESS THAN 160     ACP (advance care planning)     Symptomatic menopausal or female climacteric states     Osteoarthritis (arthritis due to wear and tear of joints)     Family history of first degree relative with dementia     Family history of cardiovascular disease     Health Care Home     Collagenous colitis     Generalized anxiety disorder     Hx of supraventricular tachycardia     Past Surgical History:   Procedure Laterality Date     C GYN EXAM, EST PT., ANNUAL  1/2012     HC COLONOSCOPY THRU STOMA, DIAGNOSTIC  July 2010    normal, repeat in 10 years     HC LAPAROSCOPY, SURGICAL; CHOLECYSTECTOMY  8/2010    Cholecystectomy, Laparoscopic     HC TOOTH EXTRACTION W/FORCEP  age 33    wisdom     HYSTERECTOMY, PAP NO LONGER INDICATED  May 2007    Ovaries are in     MAMMO SALVADOR BILATERAL  1/2012    [facility]       Social History     Tobacco Use     Smoking status: Never Smoker     Smokeless tobacco: Never Used   Substance Use Topics     Alcohol use: Yes     Comment: Socially     Family History   Problem Relation Age of Onset     Neurologic Disorder Mother 54        Pick's Disease     Prostate Cancer Father      Arthritis Father      C.A.D. Father 63        stent     Hypertension Father      Lipids Father      Lipids  "Sister          Current Outpatient Medications   Medication Sig Dispense Refill     budesonide (ENTOCORT EC) 3 MG EC capsule Take 2 capsules (6 mg) by mouth every morning 180 capsule 1     gabapentin (NEURONTIN) 100 MG capsule Take 1 capsule (100 mg) by mouth daily       gabapentin (NEURONTIN) 300 MG capsule Take 1 capsule (300 mg) by mouth At Bedtime 90 capsule 1     sertraline (ZOLOFT) 50 MG tablet Take 2 tablets (100 mg) by mouth daily 90 tablet 1     Allergies   Allergen Reactions     Bactrim      Recent Labs   Lab Test 10/24/19  1444 10/24/19 10/08/18  0931 10/10/17  0946   LDL  --  107 82 96   HDL  --  88 87 95   TRIG  --  50 86 72   ALT  --   --   --  14   CR  --  0.83  --  0.66   GFRESTIMATED  --   --   --  99   POTASSIUM  --  4.61  --  4.1   TSH 0.95  --   --   --       BP Readings from Last 3 Encounters:   06/02/20 128/80   01/27/20 98/60   10/24/19 120/80    Wt Readings from Last 3 Encounters:   06/02/20 68.2 kg (150 lb 6.4 oz)   01/27/20 67.8 kg (149 lb 6.4 oz)   10/24/19 67.6 kg (149 lb)                      Reviewed and updated as needed this visit by Provider         Review of Systems   Constitutional, HEENT, cardiovascular, pulmonary, gi and gu systems are negative, except as otherwise noted.      Objective    /80 (BP Location: Right arm, Patient Position: Chair, Cuff Size: Adult Regular)   Pulse 68   Temp 97.7  F (36.5  C)   Resp 20   Ht 1.626 m (5' 4\")   Wt 68.2 kg (150 lb 6.4 oz)   LMP  (LMP Unknown)   BMI 25.82 kg/m    Body mass index is 25.82 kg/m .  Physical Exam   GENERAL: healthy, alert and no distress  NECK: no adenopathy, no asymmetry, masses, or scars and thyroid normal to palpation  RESP: lungs clear to auscultation - no rales, rhonchi or wheezes  CV: regular rate and rhythm, normal S1 S2, no S3 or S4, no murmur, click or rub, no peripheral edema and peripheral pulses strong  ABDOMEN: soft, nontender, no hepatosplenomegaly, no masses and bowel sounds normal  PSYCH: mentation " "appears normal, affect normal/bright    Diagnostic Test Results:  Labs reviewed in Epic        Assessment & Plan   Assessment      Plan  (N95.1) Symptomatic menopausal or female climacteric states  (primary encounter diagnosis)  Comment: pt stable, feels med helps about 50%, makes her too drowsy to take anytime other than before bed  Plan: gabapentin (NEURONTIN) 300 MG capsule        continue current medications at current doses     (F41.1) Generalized anxiety disorder  Comment: pt doing fine on lower dose, still has some anxiety but feels it ok- main ? Was whenther to try something different due to colitis issues and potential contribution from serotonin specific reuptake inhibitor-we discussed that if symptoms stable now might just try lower dose as likely a class effect so other options more limited-she agrees  Plan: sertraline (ZOLOFT) 50 MG tablet        Pt will decrease to 25 mg (take half) and see if anxiety still controlled, if not can go back to 50    (K52.831) Collagenous colitis  Comment: stable symptomatically   Plan: continue current medications at current doses     (Z86.79) Hx of supraventricular tachycardia  Comment: pt notes this as she had one episode while travelling -did take a decongestant prior and had caffeine, no issues since- has been evaluated at [place] and beta blocker discussed but she has not needed  Plan:     BMI:   Estimated body mass index is 25.82 kg/m  as calculated from the following:    Height as of this encounter: 1.626 m (5' 4\").    Weight as of this encounter: 68.2 kg (150 lb 6.4 oz).           FUTURE APPOINTMENTS:       - Follow-up visit in 6 mo  Regular exercise    No follow-ups on file.    Luis Dan MD  [facility]            "

## 2020-06-02 NOTE — NURSING NOTE
Questioned patient about current smoking habits.  Pt. has never smoked.  PULSE regular  My Chart: active  CLASSIFICATION OF OVERWEIGHT AND OBESITY BY BMI                        Obesity Class           BMI(kg/m2)  Underweight                                    < 18.5  Normal                                         18.5-24.9  Overweight                                     25.0-29.9  OBESITY                     I                  30.0-34.9                             II                 35.0-39.9  EXTREME OBESITY             III                >40                            Patient's  BMI Body mass index is 25.82 kg/m .  http://hin.nhlbi.nih.gov/menuplanner/menu.cgi  Pre-visit planning  Immunizations - up to date  Colonoscopy - is up to date  Mammogram -   Asthma -   PHQ9 -    ANDREW-7 -

## 2020-06-03 DIAGNOSIS — K52.831 COLLAGENOUS COLITIS: Primary | ICD-10-CM

## 2020-06-03 RX ORDER — BUDESONIDE 3 MG/1
6 CAPSULE, COATED PELLETS ORAL EVERY MORNING
Qty: 180 CAPSULE | Refills: 1 | Status: SHIPPED | OUTPATIENT
Start: 2020-06-03 | End: 2020-08-25

## 2020-06-03 NOTE — TELEPHONE ENCOUNTER
This prescription was sent to [facility].   The patient called asking to switch to [facility].  Can you please resend to [facility].    Pending Prescriptions:                       Disp   Refills    budesonide (ENTOCORT EC) 3 MG EC capsule  180 ca*1            Sig: Take 2 capsules (6 mg) by mouth every morning

## 2020-08-25 DIAGNOSIS — K52.831 COLLAGENOUS COLITIS: ICD-10-CM

## 2020-08-25 RX ORDER — BUDESONIDE 3 MG/1
9 CAPSULE, COATED PELLETS ORAL EVERY MORNING
Qty: 270 CAPSULE | Refills: 0 | Status: SHIPPED | OUTPATIENT
Start: 2020-08-25 | End: 2021-03-15

## 2020-08-25 NOTE — TELEPHONE ENCOUNTER
Cindy called to say that she does much better on 3 caps daily of her budesonied for the diarrhea.  She has another refill at [facility] but is hoping you will increase the dose.    Pending Prescriptions:                       Disp   Refills    budesonide (ENTOCORT EC) 3 MG EC capsule  270 ca*0            Sig: Take 3 capsules (9 mg) by mouth every morning       unk

## 2020-10-22 DIAGNOSIS — N95.1 MENOPAUSAL SYNDROME (HOT FLASHES): Primary | ICD-10-CM

## 2020-10-22 RX ORDER — GABAPENTIN 100 MG/1
100 CAPSULE ORAL DAILY
Qty: 90 CAPSULE | Refills: 1 | Status: SHIPPED | OUTPATIENT
Start: 2020-10-22 | End: 2021-03-15

## 2020-10-29 ENCOUNTER — OFFICE VISIT (OUTPATIENT)
Dept: FAMILY MEDICINE | Facility: CLINIC | Age: 60
End: 2020-10-29

## 2020-10-29 VITALS
SYSTOLIC BLOOD PRESSURE: 141 MMHG | WEIGHT: 148 LBS | TEMPERATURE: 98.1 F | OXYGEN SATURATION: 97 % | DIASTOLIC BLOOD PRESSURE: 81 MMHG | HEART RATE: 67 BPM | BODY MASS INDEX: 25.4 KG/M2

## 2020-10-29 DIAGNOSIS — I10 ESSENTIAL HYPERTENSION: Primary | ICD-10-CM

## 2020-10-29 LAB
BUN SERPL-MCNC: 17 MG/DL (ref 7–25)
BUN/CREATININE RATIO: 20.2 (ref 6–22)
CALCIUM SERPL-MCNC: 10.2 MG/DL (ref 8.6–10.3)
CHLORIDE SERPLBLD-SCNC: 105.9 MMOL/L (ref 98–110)
CO2 SERPL-SCNC: 29.2 MMOL/L (ref 20–32)
CREAT SERPL-MCNC: 0.84 MG/DL (ref 0.7–1.18)
GLUCOSE SERPL-MCNC: 103 MG/DL (ref 60–99)
POTASSIUM SERPL-SCNC: 5.53 MMOL/L (ref 3.5–5.3)
SODIUM SERPL-SCNC: 141.3 MMOL/L (ref 135–146)

## 2020-10-29 PROCEDURE — 90715 TDAP VACCINE 7 YRS/> IM: CPT | Performed by: FAMILY MEDICINE

## 2020-10-29 PROCEDURE — 99213 OFFICE O/P EST LOW 20 MIN: CPT | Mod: 25 | Performed by: FAMILY MEDICINE

## 2020-10-29 PROCEDURE — 90471 IMMUNIZATION ADMIN: CPT | Performed by: FAMILY MEDICINE

## 2020-10-29 PROCEDURE — 80048 BASIC METABOLIC PNL TOTAL CA: CPT | Performed by: FAMILY MEDICINE

## 2020-10-29 PROCEDURE — 36415 COLL VENOUS BLD VENIPUNCTURE: CPT | Performed by: FAMILY MEDICINE

## 2020-10-29 PROCEDURE — 90686 IIV4 VACC NO PRSV 0.5 ML IM: CPT | Performed by: FAMILY MEDICINE

## 2020-10-29 PROCEDURE — 90472 IMMUNIZATION ADMIN EACH ADD: CPT | Performed by: FAMILY MEDICINE

## 2020-10-29 RX ORDER — BIMATOPROST 3 UG/ML
SOLUTION TOPICAL
COMMUNITY
Start: 2020-03-03 | End: 2021-03-15

## 2020-10-29 RX ORDER — LISINOPRIL 10 MG/1
10 TABLET ORAL DAILY
Qty: 30 TABLET | Refills: 2 | Status: SHIPPED | OUTPATIENT
Start: 2020-10-29 | End: 2020-11-18

## 2020-10-29 NOTE — PROGRESS NOTES
Subjective     Cindy Mota is a 60 year old female who presents to clinic today for the following health issues:    HPI         Hypertension concerns-feels she can feel blood pulsing in her body, no chest pain or shortness of breath , no HA      Do you check your blood pressure regularly outside of the clinic? Yes     Are you following a low salt diet? No    Are your blood pressures ever more than 140 on the top number (systolic) OR more   than 90 on the bottom number (diastolic), for example 140/90? Yes     Pt testing at home every few days- brings values for 2-3 weeks, 120-150's/80-90's    No new vitamins or supplements    HTN in dad and sister      How many servings of fruits and vegetables do you eat daily?  2-3    On average, how many sweetened beverages do you drink each day (Examples: soda, juice, sweet tea, etc.  Do NOT count diet or artificially sweetened beverages)?   1    How many days per week do you exercise enough to make your heart beat faster? 4    How many minutes a day do you exercise enough to make your heart beat faster? 30 - 60    How many days per week do you miss taking your medication? 0        Review of Systems   Constitutional, HEENT, cardiovascular, pulmonary, gi and gu systems are negative, except as otherwise noted.      Objective    BP (!) 141/81   Pulse 67   Temp 98.1  F (36.7  C) (Oral)   Wt 67.1 kg (148 lb)   LMP  (LMP Unknown)   SpO2 97%   BMI 25.40 kg/m    Body mass index is 25.4 kg/m .  Physical Exam   GENERAL: healthy, alert and no distress  NECK: no adenopathy, no asymmetry, masses, or scars and thyroid normal to palpation  RESP: lungs clear to auscultation - no rales, rhonchi or wheezes  CV: regular rate and rhythm, normal S1 S2, no S3 or S4, no murmur, click or rub, no peripheral edema and peripheral pulses strong  ABDOMEN: soft, nontender, no hepatosplenomegaly, no masses and bowel sounds normal  MS: no gross musculoskeletal defects noted, no edema             Assessment & Plan     Essential hypertension  New diagnosis- meets criteria, discussed options- she would like to start with lisinopril, discussed we could add hydrochlorothiazide in future if needed, I reviewed the risks, benefits, and possible side effects of the medication.  The patient had an opportunity to ask any questions regarding the treatment plan. The patient was encouraged to call my office if any problems. Check blood pressure readings outside of the clinic several times per week, write down values, and follow up if elevated within the next several weeks. Blood pressure can be checked at the firestation, drugstore,  or any valid site.   - lisinopril (ZESTRIL) 10 MG tablet  Dispense: 30 tablet; Refill: 2  - Basic Metabolic Panel (BFP)  - VENOUS COLLECTION          FUTURE APPOINTMENTS:       - Follow-up visit in 3 mo  Regular exercise    No follow-ups on file.    Luis Dan MD  [facility]

## 2020-10-29 NOTE — NURSING NOTE
Cindy is here for a BP check.    Pre-Visit Screening:  Immunizations:Tdap, Flu  Colonoscopy:UTD  Mammogram:2019  Asthma Action Test/Plan:NA  PHQ9:NA  GAD7:NA  Questioned patient about current smoking habits Pt.never smoked  OK to leave a detailed message on voice mail for today's visit yes, phone # [phone]

## 2020-11-18 ENCOUNTER — MYC MEDICAL ADVICE (OUTPATIENT)
Dept: FAMILY MEDICINE | Facility: CLINIC | Age: 60
End: 2020-11-18

## 2020-11-18 DIAGNOSIS — I10 ESSENTIAL HYPERTENSION: ICD-10-CM

## 2020-11-18 RX ORDER — LISINOPRIL 20 MG/1
20 TABLET ORAL DAILY
Qty: 30 TABLET | Refills: 1 | Status: SHIPPED | OUTPATIENT
Start: 2020-11-18 | End: 2020-12-14

## 2020-11-19 DIAGNOSIS — Z12.31 VISIT FOR SCREENING MAMMOGRAM: ICD-10-CM

## 2020-11-30 DIAGNOSIS — F41.1 GENERALIZED ANXIETY DISORDER: ICD-10-CM

## 2020-12-01 NOTE — TELEPHONE ENCOUNTER
Cindy Mota is requesting a refill of:    Refused Prescriptions:                       Disp   Refills    sertraline (ZOLOFT) 50 MG tablet [Pharmacy*90 tab*7        Sig: TAKE 2 TABLETS DAILY  Refused By: SKY BOWER  Reason for Refusal: Patient needs appointment      Last depression med recheck was 06/02/020 advised to return in 6 months. Pt due for OV

## 2020-12-08 DIAGNOSIS — N95.1 SYMPTOMATIC MENOPAUSAL OR FEMALE CLIMACTERIC STATES: ICD-10-CM

## 2020-12-08 NOTE — TELEPHONE ENCOUNTER
Pt was in on 10/22/2020 and was given a 6 month of the 100 mg gabapentin but forgot to send in the 300 mg and she takes 400 mg at night - please send in the 300 mg    Patient's phone #[phone]

## 2020-12-09 RX ORDER — GABAPENTIN 300 MG/1
300 CAPSULE ORAL AT BEDTIME
Qty: 90 CAPSULE | Refills: 1 | Status: SHIPPED | OUTPATIENT
Start: 2020-12-09 | End: 2021-03-15

## 2020-12-14 ENCOUNTER — MYC MEDICAL ADVICE (OUTPATIENT)
Dept: FAMILY MEDICINE | Facility: CLINIC | Age: 60
End: 2020-12-14

## 2020-12-14 DIAGNOSIS — F41.1 GENERALIZED ANXIETY DISORDER: ICD-10-CM

## 2020-12-14 DIAGNOSIS — I10 ESSENTIAL HYPERTENSION: ICD-10-CM

## 2020-12-14 RX ORDER — LISINOPRIL 20 MG/1
20 TABLET ORAL DAILY
Qty: 90 TABLET | Refills: 1 | Status: SHIPPED | OUTPATIENT
Start: 2020-12-14 | End: 2021-06-28

## 2020-12-14 NOTE — TELEPHONE ENCOUNTER
Cindy Mota is requesting a refill of:    Pending Prescriptions:                       Disp   Refills    lisinopril (ZESTRIL) 20 MG tablet         90 tab*0            Sig: Take 1 tablet (20 mg) by mouth daily

## 2021-03-11 DIAGNOSIS — K52.831 COLLAGENOUS COLITIS: ICD-10-CM

## 2021-03-11 RX ORDER — BUDESONIDE 3 MG/1
CAPSULE, COATED PELLETS ORAL
Qty: 270 CAPSULE | Refills: 3 | COMMUNITY
Start: 2021-03-11

## 2021-03-11 NOTE — TELEPHONE ENCOUNTER
Received incoming refill request for  Pending Prescriptions:                       Disp   Refills    budesonide (ENTOCORT EC) 3 MG EC capsule *270 ca*3            Sig: TAKE 3 CAPSULES EVERY MORNING    Patient last had a refill of this medication on 8/25/2020 and the patient was last seen for an OV on 10/29/20 and she was told to return in 3 months. The patient is now due to be seen and there is nothing currently scheduled so this is being denied.

## 2021-03-15 ENCOUNTER — OFFICE VISIT (OUTPATIENT)
Dept: FAMILY MEDICINE | Facility: CLINIC | Age: 61
End: 2021-03-15

## 2021-03-15 VITALS
BODY MASS INDEX: 25.16 KG/M2 | RESPIRATION RATE: 20 BRPM | HEIGHT: 65 IN | SYSTOLIC BLOOD PRESSURE: 140 MMHG | DIASTOLIC BLOOD PRESSURE: 72 MMHG | WEIGHT: 151 LBS | HEART RATE: 64 BPM | OXYGEN SATURATION: 99 % | TEMPERATURE: 98.3 F

## 2021-03-15 DIAGNOSIS — N95.1 MENOPAUSAL SYNDROME (HOT FLASHES): ICD-10-CM

## 2021-03-15 DIAGNOSIS — I10 ESSENTIAL HYPERTENSION: Primary | ICD-10-CM

## 2021-03-15 DIAGNOSIS — K52.831 COLLAGENOUS COLITIS: ICD-10-CM

## 2021-03-15 DIAGNOSIS — N95.1 SYMPTOMATIC MENOPAUSAL OR FEMALE CLIMACTERIC STATES: ICD-10-CM

## 2021-03-15 DIAGNOSIS — F41.1 GENERALIZED ANXIETY DISORDER: ICD-10-CM

## 2021-03-15 DIAGNOSIS — I10 ESSENTIAL HYPERTENSION: ICD-10-CM

## 2021-03-15 LAB
BUN SERPL-MCNC: 11 MG/DL (ref 7–25)
BUN/CREATININE RATIO: 13.6 (ref 6–22)
CALCIUM SERPL-MCNC: 9.5 MG/DL (ref 8.6–10.3)
CHLORIDE SERPLBLD-SCNC: 105.9 MMOL/L (ref 98–110)
CO2 SERPL-SCNC: 31.6 MMOL/L (ref 20–32)
CREAT SERPL-MCNC: 0.81 MG/DL (ref 0.6–1.3)
GLUCOSE SERPL-MCNC: 98 MG/DL (ref 60–99)
POTASSIUM SERPL-SCNC: 4.43 MMOL/L (ref 3.5–5.3)
SODIUM SERPL-SCNC: 141.8 MMOL/L (ref 135–146)

## 2021-03-15 PROCEDURE — 99214 OFFICE O/P EST MOD 30 MIN: CPT | Performed by: FAMILY MEDICINE

## 2021-03-15 PROCEDURE — 80048 BASIC METABOLIC PNL TOTAL CA: CPT | Performed by: FAMILY MEDICINE

## 2021-03-15 PROCEDURE — 36415 COLL VENOUS BLD VENIPUNCTURE: CPT | Performed by: FAMILY MEDICINE

## 2021-03-15 RX ORDER — LISINOPRIL 20 MG/1
TABLET ORAL
Qty: 30 TABLET | Refills: 1 | COMMUNITY
Start: 2021-03-15

## 2021-03-15 RX ORDER — GABAPENTIN 300 MG/1
300 CAPSULE ORAL AT BEDTIME
Qty: 90 CAPSULE | Refills: 1 | Status: SHIPPED | OUTPATIENT
Start: 2021-03-15 | End: 2021-10-12

## 2021-03-15 RX ORDER — BUDESONIDE 3 MG/1
9 CAPSULE, COATED PELLETS ORAL EVERY MORNING
Qty: 270 CAPSULE | Refills: 1 | Status: SHIPPED | OUTPATIENT
Start: 2021-03-15 | End: 2021-10-12

## 2021-03-15 RX ORDER — GABAPENTIN 100 MG/1
100 CAPSULE ORAL DAILY
Qty: 90 CAPSULE | Refills: 1 | Status: SHIPPED | OUTPATIENT
Start: 2021-03-15 | End: 2021-10-12

## 2021-03-15 ASSESSMENT — MIFFLIN-ST. JEOR: SCORE: 1247.87

## 2021-03-15 NOTE — TELEPHONE ENCOUNTER
Received incoming refill request for  Pending Prescriptions:                       Disp   Refills    lisinopril (ZESTRIL) 20 MG tablet [Pharma*30 tab*1            Sig: TAKE 1 TABLET BY MOUTH EVERY DAY    Patient last had a refill of this medication on 12/14/2020 for a 6 month supply. No refill should be needed yet.

## 2021-03-15 NOTE — PROGRESS NOTES
"    Assessment & Plan     Essential hypertension  Well controlled, continue current medications at current doses , recheck BMP  - Basic Metabolic Panel (BFP)  - VENOUS COLLECTION    Generalized anxiety disorder  No anxiety-mainly just menopausal issues    Symptomatic menopausal or female climacteric states  stable symptomatically , continue current medications at current doses   - gabapentin (NEURONTIN) 300 MG capsule  Dispense: 90 capsule; Refill: 1    Collagenous colitis-reviewed GI notes 3/20  Pt using less budesonide-discussed issues involving long term steroid use, recommend dexa-will schedule  - budesonide (ENTOCORT EC) 3 MG EC capsule  Dispense: 270 capsule; Refill: 1    Menopausal syndrome (hot flashes)    - gabapentin (NEURONTIN) 100 MG capsule  Dispense: 90 capsule; Refill: 1      Review of external notes as documented elsewhere in note  Review of the result(s) of each unique test - labs         BMI:   Estimated body mass index is 25.52 kg/m  as calculated from the following:    Height as of this encounter: 1.638 m (5' 4.5\").    Weight as of this encounter: 68.5 kg (151 lb).       FUTURE APPOINTMENTS:       - Follow-up visit in 6 mo  Regular exercise    No follow-ups on file.    Luis Dan MD  [facility]    Subjective   Cindy is a 61 year old who presents for the following health issues     HPI       Hypertension Follow-up, lisinopril 20      Do you check your blood pressure regularly outside of the clinic? Yes     Are you following a low salt diet? No    Are your blood pressures ever more than 140 on the top number (systolic) OR more   than 90 on the bottom number (diastolic), for example 140/90? No    Anxiety/Menopausal symptoms  Ylrwew-Kv-lngigm uses meds for hotflashes-gabapentin and sertraline    How are you doing with your anxiety since your last visit? No change    Are you having other symptoms that might be associated with anxiety? No    Have you had a significant " "life event? No     Are you feeling depressed? No    Do you have any concerns with your use of alcohol or other drugs? No    Social History     Tobacco Use     Smoking status: Never Smoker     Smokeless tobacco: Never Used   Substance Use Topics     Alcohol use: Yes     Comment: Socially     Drug use: No     ANDREW-7 SCORE 7/25/2019   Total Score 0     PHQ 6/27/2016 7/25/2019   PHQ-9 Total Score 0 2   Q9: Thoughts of better off dead/self-harm past 2 weeks Not at all Not at all         Colitis- doing well, using budesonide at lower doses otfen, wonders about long term side effects         How many servings of fruits and vegetables do you eat daily?  2-3    On average, how many sweetened beverages do you drink each day (Examples: soda, juice, sweet tea, etc.  Do NOT count diet or artificially sweetened beverages)?   1    How many days per week do you exercise enough to make your heart beat faster? 7    How many minutes a day do you exercise enough to make your heart beat faster? 30 - 60    How many days per week do you miss taking your medication? 0        Review of Systems   Constitutional, HEENT, cardiovascular, pulmonary, gi and gu systems are negative, except as otherwise noted.      Objective    BP (!) 140/72 (BP Location: Right arm, Patient Position: Sitting, Cuff Size: Adult Regular)   Pulse 64   Temp 98.3  F (36.8  C) (Oral)   Resp 20   Ht 1.638 m (5' 4.5\")   Wt 68.5 kg (151 lb)   LMP  (LMP Unknown)   SpO2 99%   BMI 25.52 kg/m    Body mass index is 25.52 kg/m .  Physical Exam   GENERAL: healthy, alert and no distress  NECK: no adenopathy, no asymmetry, masses, or scars and thyroid normal to palpation  RESP: lungs clear to auscultation - no rales, rhonchi or wheezes  CV: regular rate and rhythm, normal S1 S2, no S3 or S4, no murmur, click or rub, no peripheral edema and peripheral pulses strong  ABDOMEN: soft, nontender, no hepatosplenomegaly, no masses and bowel sounds normal  MS: no gross musculoskeletal " defects noted, no edema  PSYCH: mentation appears normal, affect normal/bright    Results for orders placed or performed in visit on 03/15/21 (from the past 24 hour(s))   Basic Metabolic Panel (BFP)   Result Value Ref Range    Carbon Dioxide 31.6 20 - 32 mmol/L    Creatinine 0.81 0.60 - 1.30 mg/dL    Glucose 98 60 - 99 mg/dL    Sodium 141.8 135 - 146 mmol/L    Potassium 4.43 3.5 - 5.3 mmol/L    Chloride 105.9 98 - 110 mmol/L    Urea Nitrogen 11 7 - 25 mg/dL    Calcium 9.5 8.6 - 10.3 mg/dL    BUN/Creatinine Ratio 13.6 6 - 22

## 2021-03-15 NOTE — NURSING NOTE
Chief Complaint   Patient presents with     Recheck Medication     medication check and review for budesonide, needs refill     Pre-visit Screening:  Immunizations:  up to date  Colonoscopy:  is up to date  Mammogram: is up to date  Asthma Action Test/Plan:  NA  PHQ9: NA  GAD7:  NA  Questioned patient about current smoking habits Pt. has never smoked.  Ok to leave detailed message on voice mail for today's visit only Yes, phone # [phone]

## 2021-03-27 ENCOUNTER — IMMUNIZATION (OUTPATIENT)
Dept: NURSING | Facility: CLINIC | Age: 61
End: 2021-03-27
Payer: COMMERCIAL

## 2021-03-27 PROCEDURE — 91301 PR COVID VAC MODERNA 100 MCG/0.5 ML IM: CPT

## 2021-03-27 PROCEDURE — 0011A PR COVID VAC MODERNA 100 MCG/0.5 ML IM: CPT

## 2021-04-14 ENCOUNTER — OFFICE VISIT (OUTPATIENT)
Dept: FAMILY MEDICINE | Facility: CLINIC | Age: 61
End: 2021-04-14

## 2021-04-14 VITALS
SYSTOLIC BLOOD PRESSURE: 122 MMHG | BODY MASS INDEX: 24.49 KG/M2 | DIASTOLIC BLOOD PRESSURE: 80 MMHG | HEIGHT: 65 IN | HEART RATE: 65 BPM | OXYGEN SATURATION: 98 % | TEMPERATURE: 97.9 F | WEIGHT: 147 LBS

## 2021-04-14 DIAGNOSIS — R30.0 DYSURIA: Primary | ICD-10-CM

## 2021-04-14 LAB
ALBUMIN (URINE): ABNORMAL MG/DL
APPEARANCE UR: CLEAR
BACTERIA, UR: ABNORMAL
BILIRUB UR QL: ABNORMAL
CASTS/LPF: ABNORMAL
COLOR UR: ABNORMAL
EP/HPF: ABNORMAL
GLUCOSE URINE: ABNORMAL MG/DL
HGB UR QL: ABNORMAL
KETONES UR QL: ABNORMAL MG/DL
LEUKOCYTE ESTERASE - QUEST: ABNORMAL
MISC.: ABNORMAL
NITRITE UR QL STRIP: ABNORMAL
PH UR STRIP: 6 PH (ref 5–7)
RBC, UR MICRO: ABNORMAL (ref ?–2)
SP. GRAVITY: 1.01
UROBILINOGEN UR QL STRIP: 0.2 EU/DL (ref 0.2–1)
WBC, UR MICRO: ABNORMAL (ref ?–2)

## 2021-04-14 PROCEDURE — 81001 URINALYSIS AUTO W/SCOPE: CPT | Performed by: PHYSICIAN ASSISTANT

## 2021-04-14 PROCEDURE — 99212 OFFICE O/P EST SF 10 MIN: CPT | Performed by: PHYSICIAN ASSISTANT

## 2021-04-14 ASSESSMENT — MIFFLIN-ST. JEOR: SCORE: 1229.73

## 2021-04-14 NOTE — PROGRESS NOTES
Chief Complaint   Patient presents with     UTI     pt has had urgency upon urination for 2 weeks     SUBJECTIVE  Cindy Mota in for a possible UTI.  Symptoms started 3 week(s) ago, but worsened again 2 days ago. Symptoms include urgency, frequency, suprapubic pain and pressure and voiding in small amounts  She denies dysuria, burning, back pain, nausea, vomiting, fever and chills. She does have a history of uti's.  She denies any unusual vaginal discharge or odor.     She is sexually active.  LMP Menopausal.     Recent abx use? No  Flank pain?  No    OTC treatment? None    Current Outpatient Medications   Medication     budesonide (ENTOCORT EC) 3 MG EC capsule     gabapentin (NEURONTIN) 100 MG capsule     gabapentin (NEURONTIN) 300 MG capsule     lisinopril (ZESTRIL) 20 MG tablet     sertraline (ZOLOFT) 50 MG tablet     No current facility-administered medications for this visit.        Patient Active Problem List    Diagnosis Date Noted     Collagenous colitis 06/02/2020     Priority: Medium     Generalized anxiety disorder 06/02/2020     Priority: Medium     Hx of supraventricular tachycardia 06/02/2020     Priority: Medium     ACP (advance care planning) 06/04/2012     Priority: Medium     Advance Care Planning 5/6/2016: ACP Review of Chart / Resources Provided:  Reviewed chart for advance care plan.  Cindy Mota has no plan or code status on file. Discussed available resources and provided with information. Confirmed code status reflects current choices pending further ACP discussions.  Confirmed/documented legally designated decision makers.  Added by Humera Gaitan                   Symptomatic menopausal or female climacteric states 06/04/2012     Priority: Medium     Osteoarthritis (arthritis due to wear and tear of joints) 06/04/2012     Priority: Medium     Family history of first degree relative with dementia 06/04/2012     Priority: Medium     Family history of cardiovascular disease  "06/04/2012     Priority: Medium     CARDIOVASCULAR SCREENING; LDL GOAL LESS THAN 160 10/31/2010     Priority: Medium     Health Care Home 11/26/2013     Priority: Low       OBJECTIVE:    /80 (BP Location: Left arm, Patient Position: Sitting, Cuff Size: Adult Large)   Pulse 65   Temp 97.9  F (36.6  C) (Temporal)   Ht 1.638 m (5' 4.5\")   Wt 66.7 kg (147 lb)   LMP  (LMP Unknown)   SpO2 98%   BMI 24.84 kg/m      Patient is a 61 year old female, in no acute distress. Vitals noted.  Cardiac:  Normal rhythm and rate, no murmurs, rubs or gallops.  Lungs: clear to auscultation bilaterally; no wheezes, crackles or rhonchi  Abdomen:  Bowel sounds normal. Soft,  not distended, no masses, no hepatosplenomegaly, non-tender.  There is not CVA tenderness.    Urinalysis:  Few bacteria, otherwise normal.     Culture pending?  No      ASSESSMENT:  1. Dysuria            PLAN:  Reassured by normal UA today. Suspect this may be due to atrophic vaginitis. Pt has used Replens before and may try again. Offered pelvic exam, but pt declines at this time. She would like to continue monitoring, and contact me if wanting further evaluation.     Cindy Mota understands and agrees with the plan.    Marce Antony PA-C      "

## 2021-04-14 NOTE — NURSING NOTE
Cindy is here because she has urgency upon urination for 2 weeks.      Pre-visit Screening:  Immunizations:  up to date  Colonoscopy:  is up to date  Mammogram: is up to date  Asthma Action Test/Plan:  NA  PHQ9:  UTD   GAD7:  UTD  Questioned patient about current smoking habits Pt. has never smoked.  Ok to leave detailed message on voice mail for today's visit only Yes, phone # cell

## 2021-04-17 ENCOUNTER — HEALTH MAINTENANCE LETTER (OUTPATIENT)
Age: 61
End: 2021-04-17

## 2021-04-24 ENCOUNTER — IMMUNIZATION (OUTPATIENT)
Dept: NURSING | Facility: CLINIC | Age: 61
End: 2021-04-24
Attending: INTERNAL MEDICINE
Payer: COMMERCIAL

## 2021-04-24 PROCEDURE — 91301 PR COVID VAC MODERNA 100 MCG/0.5 ML IM: CPT

## 2021-04-24 PROCEDURE — 0012A PR COVID VAC MODERNA 100 MCG/0.5 ML IM: CPT

## 2021-05-20 DIAGNOSIS — F41.1 GENERALIZED ANXIETY DISORDER: ICD-10-CM

## 2021-05-21 NOTE — TELEPHONE ENCOUNTER
Cindy Mota is requesting a refill of:    Pending Prescriptions:                       Disp   Refills    sertraline (ZOLOFT) 50 MG tablet [Pharmac*90 tab*0            Sig: TAKE 1 TABLET DAILY    Please close encounter if RX was sent. Thanks, Yue

## 2021-06-17 NOTE — NURSING NOTE
Cindy Novoafrancesca is here for a possible UTI. Sx include pain,frequency and cloudy urine for the past 2 days.    Questioned patient about current smoking habits.  Pt. has never smoked.  Body mass index is 26.06 kg/(m^2).  PULSE regular  My Chart: active  CLASSIFICATION OF OVERWEIGHT AND OBESITY BY BMI                        Obesity Class           BMI(kg/m2)  Underweight                                    < 18.5  Normal                                         18.5-24.9  Overweight                                     25.0-29.9  OBESITY                     I                  30.0-34.9                             II                 35.0-39.9  EXTREME OBESITY             III                >40                            Patient's  BMI Body mass index is 26.06 kg/(m^2).  http://hin.nhlbi.nih.gov/menuplanner/menu.cgi    Pre-visit planning  Immunizations - up to date  Colonoscopy - is up to date  Mammogram - is up to date  Asthma -   PHQ9 -    ANDREW-7 -           RN placing call to pt to convey results/recomendations per Dr. Carrillo. RN reviewed the following below, \"    Benign fibroids as expected.  Benign lining of the uterus. Please let patient know. Thank you, Angela Carrillo\"    Pt very appreciative of information. Pt does not have any other questions or concerns. RN reviewed pt upcoming visit with Dr. Carrillo on 6/28.

## 2021-06-24 DIAGNOSIS — I10 ESSENTIAL HYPERTENSION: ICD-10-CM

## 2021-06-25 NOTE — TELEPHONE ENCOUNTER
Received incoming refill request for   Pending Prescriptions:                       Disp   Refills    lisinopril (ZESTRIL) 20 MG tablet [Pharma*90 tab*3            Sig: TAKE 1 TABLET DAILY    Last refill of this medication was given on 12/14/2020 for a 6 month supply. The patient was last seen on 3/15/2021 and was told to return in 6 months. Routing to Dr. Dan for approval or denial of refill request.

## 2021-06-28 RX ORDER — LISINOPRIL 20 MG/1
TABLET ORAL
Qty: 90 TABLET | Refills: 3 | Status: SHIPPED | OUTPATIENT
Start: 2021-06-28 | End: 2022-05-10

## 2021-07-22 DIAGNOSIS — F41.1 GENERALIZED ANXIETY DISORDER: Primary | ICD-10-CM

## 2021-07-22 RX ORDER — SERTRALINE HYDROCHLORIDE 25 MG/1
25 TABLET, FILM COATED ORAL DAILY
Qty: 90 TABLET | Refills: 0 | Status: SHIPPED | OUTPATIENT
Start: 2021-07-22 | End: 2021-10-12

## 2021-07-22 NOTE — TELEPHONE ENCOUNTER
Pt is taking 75 mg daily and is wondering if you can send a script in for that, states it is working for the hot flashes. Pt is aware she is due to be seen in Sept.     Pending Prescriptions:                       Disp   Refills    sertraline (ZOLOFT) 50 MG tablet          90 tab*0            Sig: Take 1 tablet (50 mg) by mouth daily    sertraline (ZOLOFT) 25 MG tablet          90 tab*0            Sig: Take 1 tablet (25 mg) by mouth daily

## 2021-10-02 ENCOUNTER — HEALTH MAINTENANCE LETTER (OUTPATIENT)
Age: 61
End: 2021-10-02

## 2021-10-11 ENCOUNTER — OFFICE VISIT (OUTPATIENT)
Dept: DERMATOLOGY | Facility: CLINIC | Age: 61
End: 2021-10-11
Payer: COMMERCIAL

## 2021-10-11 VITALS — DIASTOLIC BLOOD PRESSURE: 86 MMHG | SYSTOLIC BLOOD PRESSURE: 128 MMHG

## 2021-10-11 DIAGNOSIS — D22.9 MULTIPLE BENIGN NEVI: ICD-10-CM

## 2021-10-11 DIAGNOSIS — Z85.828 HISTORY OF BASAL CELL CARCINOMA (BCC): ICD-10-CM

## 2021-10-11 DIAGNOSIS — L81.4 LENTIGINES: Primary | ICD-10-CM

## 2021-10-11 DIAGNOSIS — L82.1 SEBORRHEIC KERATOSES: ICD-10-CM

## 2021-10-11 DIAGNOSIS — L84 CORN: ICD-10-CM

## 2021-10-11 DIAGNOSIS — B07.0 PLANTAR WART: ICD-10-CM

## 2021-10-11 DIAGNOSIS — D18.01 CHERRY ANGIOMA: ICD-10-CM

## 2021-10-11 PROCEDURE — 99213 OFFICE O/P EST LOW 20 MIN: CPT | Mod: 25 | Performed by: PHYSICIAN ASSISTANT

## 2021-10-11 PROCEDURE — 17110 DESTRUCTION B9 LES UP TO 14: CPT | Performed by: PHYSICIAN ASSISTANT

## 2021-10-11 NOTE — PATIENT INSTRUCTIONS
Proper skin care from [facility]:    -Eliminate harsh soaps as they strip the natural oils from the skin, often resulting in dry itchy skin ( i.e. Dial, Zest, Hattie Spring)  -Use mild soaps such as Cetaphil or Dove Sensitive Skin in the shower. You do not need to use soap on arms, legs, and trunk every time you shower unless visibly soiled.   -Avoid hot or cold showers.  -After showering, lightly dry off and apply moisturizing within 2-3 minutes. This will help trap moisture in the skin.   -Aggressive use of a moisturizer at least 1-2 times a day to the entire body (including -Vanicream, Cetaphil, Aquaphor or Cerave) and moisturize hands after every washing.  -We recommend using moisturizers that come in a tub that needs to be scooped out, not a pump. This has more of an oil base. It will hold moisture in your skin much better than a water base moisturizer. The above recommended are non-pore clogging.      Wear a sunscreen with at least SPF 30 on your face, ears, neck and V of the chest daily. Wear sunscreen on other areas of the body if those areas are exposed to the sun throughout the day. Sunscreens can contain physical and/or chemical blockers. Physical blockers are less likely to clog pores, these include zinc oxide and titanium dioxide. Reapply every two hour and after swimming.     Sunscreen examples: https://www.ewg.org/sunscreen/    UV radiation  UVA radiation remains constant throughout the day and throughout the year. It is a longer wavelength than UVB and therefore penetrates deeper into the skin leading to immediate and delayed tanning, photoaging, and skin cancer. 70-80% of UVA and UVB radiation occurs between the hours of 10am-2pm.  UVB radiation  UVB radiation causes the most harmful effects and is more significant during the summer months. However, snow and ice can reflect UVB radiation leading to skin damage during the winter months as well. UVB radiation is responsible for tanning,  burning, inflammation, delayed erythema (pinkness), pigmentation (brown spots), and skin cancer.     I recommend self monthly full body exams and yearly full body exams with a dermatology provider. If you develop a new or changing lesion please follow up for examination. Most skin cancers are pink and scaly or pink and pearly. However, we do see blue/brown/black skin cancers.  Consider the ABCDEs of melanoma when giving yourself your monthly full body exam ( don't forget the groin, buttocks, feet, toes, etc). A-asymmetry, B-borders, C-color, D-diameter, E-elevation or evolving. If you see any of these changes please follow up in clinic. If you cannot see your back I recommend purchasing a hand held mirror to use with a larger wall mirror.          WARTS  Warts are caused by the Human Papilloma Virus.They are commonly spread by direct contact or autoinoculation. That mean if the wart is picked at it could spread to another area of skin.   In children without treatment 50% of warts will disappear spontaneous in 6 months, 90% are gone in 2 years. In adults they can last for a longer period of time, but they can resolve without treatment.   No method of treatment is better than the other. You just have to be consistent with your treatments and return every 4-6 weeks.   In between treatments you should use either salicylic acid or mediplast tape:    Mediplast tape: Cut to size of wart, leave tape on for 1 week, (Put duct tape over it to secure it). In 1 week, pare skin down with a pumice stone and repeat. You want to pare down until you see some pinpoint bleeding. Do this for 6-8 weeks, if no improvement, make follow up appt.     Wart stick can be purchased online. (Twice a day, warts turn white, then pare down with a pumice stone and repeat) Do this for 6-8 weeks, if no improvement, make a follow up appt.       WART TREATMENTS    Wart(s), or verruca vulgaris  are a very common, benign skin condition caused by a virus.   Since warts are caused by a viral infection, your own immune system will typically clear the lesion on average from several months to 2 years. Although wart(s) do not easily spread to others, the virus may still pass through direct contact (picking or scratching) and/or indirect contact (locker rooms/public showers).     It is important to remember that there is no cure for the wart virus. This means that warts can return at the same site or appear in a new spot.    Sometimes, it seems that new warts appear as fast as old ones go away. This happens when the old warts shed virus cells into the skin before the warts are treated. This allows new warts to grow around the first warts. The best way to prevent this is to have your dermatologist treat new warts as soon as they appear.    There are several technique/methods to making the wart(s) smaller in size or to help stimulate your immune system to clear the wart(s). The mainstay of treatment of wart(s) is to destroy the infected skin cells from the virus and to prevent recurrence.  The most important thing to remember is that regular consistent treatment is the most effective way to get rid of the wart.    Salicylic acid & Debridement   The first line treatment of warts is application of salicylic acid (with or without duct tape occlusion). Application of salicylic acid allows the wart(s) to stay flat and not callused. This allows other topical treatments to work better.      We recommend Wart Stick or Mediplast Tape over-the-counter   Directions: Apply the Wart Stick to the site twice daily or apply a piece of the Mediplast tape to the wart and cover tightly with tape to occlude the lesion.   Keep the medication on for 24 hours before removing/changing. Do this foe one week continuously.      After one week, when the salicylic acid is removed from the affected area the wart(s) area should turn the skin white/softened. Use an emery board/paring tool to rub off the  softened tissue before changing a new salicylic acid application. Make sure you dispose the emery board and do NOT reuse on another site.     Cryotherapy or Freezing   Freezing wart(s) with a very cold substance (liquid nitrogen) is an effective treatment for common warts. The wart(s) are frozen off to kill the viral activity in and around the affected area. The treated areas will become sore and/or red for the next few hours. Some adverse reactions of this treatment include: pain, blisters, blood blisters, infection, and/or lightening or darkening of the skin.   At times, when the wart is too thick and/or callused, the clinician will shave/pare down the thickened skin prior to spraying the wart(s) with liquid nitrogen.     Aldara  Cream   Aldara  Cream is a topical medication that activates your own immune system to help attack the cells infected with the virus. The cream works for resistant or recurrent warts that do not respond to freezing and/or salicylic acid.   Directions: Open the medication packet & apply to the affected area. Cover the lesion with a band-aid. The next morning, wash off the area with soap & water.  If the cream is going to work the wart(s) should get red and irritated.      Cantharidin (Canthacur/Cantharone)   Cantharidin is a chemical compound derived from a blister beetle. This liquid medication is applied to wart(s) in order to cause blistering, thus destroying the affected areas. At your  visit, application of cantharidin to the wart(s) is usually painless and the applied areas dry clear. Three to four hours after cantharidin is applied to the warts, you must wash off the area(s) with soap and water. Adverse reactions such as redness, tenderness, blistering, itching and burning sensations may occur within 2-3 days. It is expected to take 2-4 weeks for the treated areas to heal. Please follow up your provider in 6 weeks to reassess the treated areas.     Electrosurgery and  "curettage  Electrosurgery (burning) and Curettage (scraping off) of the wart(s) are often are used together. The dermatologist may remove the wart by scraping it off before or after electrosurgery. Some adverse reactions of this treatment include: pain, scarring, infection, and/or lightening or darkening of the skin.    Excision  Cutting out the wart is another option for wart treatment.  Some adverse reactions of this treatment include: pain, scarring, infection, and/or lightening or darkening of the skin.    TREATMENT RESISTANT WARTS    If the warts are hard-to-treat, we may use one of the following treatments:     Laser treatment  Laser treatment is an option, mainly for warts that have not responded to other therapies. Some adverse reactions of this treatment include: pain, scarring, infection, and/or lightening or darkening of the skin.    Chemical peels  When flat warts appear, there are usually many warts. Because so many warts appear, dermatologists often prescribe \"peeling\" methods to treat these warts. This means, either the doctor or you will apply a peeling medicine every couple weeks or at home every day. Peeling medicines include salicylic acid (stronger than you can buy at the store), tretinoin, and glycolic acid.Some adverse reactions of this treatment include: pain, scarring, infection, and/or lightening or darkening of the skin.    Bleomycin  The wart may be injected with an anti-cancer medicine, bleomycin. The shots may hurt. Some adverse reactions of this treatment include: pain, scarring, infection, and/or lightening or darkening of the skin, or nail loss if given in the fingers.    Immunotherapy  This treatment uses the patient s own immune system to fight the warts. This treatment is used when the warts remain despite other treatments. There are two types:     i) one type of immunotherapy involves applying a chemical, such as diphencyprone (DCP), to the warts. A mild allergic reaction occurs " around the treated warts. This reaction may cause the warts to go away.     ii) Another type of immunotherapy involves getting shots of Dea antigen.  The shots can boost

## 2021-10-11 NOTE — PROGRESS NOTES
HPI:  Cindy Mota is a 61 year old female patient here today for FBE. Has some spots on chest .  Patient states this has been present for a while.  Patient reports the following symptoms: itches at times .  Patient reports the following previous treatments: none.  Patient reports the following modifying factors: none.  Associated symptoms: none.  Patient has no other skin complaints today.  Remainder of the HPI, Meds, PMH, Allergies, FH, and SH was reviewed in chart.    Pertinent Hx:   BCC on abdomen  Past Medical History:   Diagnosis Date     IBS (irritable bowel syndrome)        Past Surgical History:   Procedure Laterality Date     C GYN EXAM, EST PT., ANNUAL  1/2012     HC LAPAROSCOPY, SURGICAL; CHOLECYSTECTOMY  8/2010    Cholecystectomy, Laparoscopic     HC TOOTH EXTRACTION W/FORCEP  age 33    wisdom     HYSTERECTOMY, PAP NO LONGER INDICATED  May 2007    Ovaries are in     MAMMO SALVADOR BILATERAL  1/2012    [facility]     ZZHC COLONOSCOPY THRU STOMA, DIAGNOSTIC  July 2010    normal, repeat in 10 years        Family History   Problem Relation Age of Onset     Neurologic Disorder Mother 54        Pick's Disease     Prostate Cancer Father      Arthritis Father      C.A.D. Father 63        stent     Hypertension Father      Lipids Father      Coronary Artery Disease Father      Lipids Sister      Hypertension Sister      Diabetes No family hx of      Cerebrovascular Disease No family hx of        Social History     Socioeconomic History     Marital status:      Spouse name: Not on file     Number of children: 2     Years of education: Not on file     Highest education level: Not on file   Occupational History     Employer: [email or web address]   Tobacco Use     Smoking status: Never Smoker     Smokeless tobacco: Never Used   Substance and Sexual Activity     Alcohol use: Yes     Comment: Socially     Drug use: No     Sexual activity: Yes     Partners: Male     Birth control/protection: Surgical     Comment:  hysterectomy   Other Topics Concern     Parent/sibling w/ CABG, MI or angioplasty before 65F 55M? Not Asked      Service No     Blood Transfusions No     Caffeine Concern No     Occupational Exposure No     Hobby Hazards No     Sleep Concern No     Stress Concern No     Weight Concern No     Special Diet Yes     Comment: low fat     Back Care No     Exercise Yes     Comment: walks     Bike Helmet Not Asked     Seat Belt Yes     Self-Exams Yes   Social History Narrative     Not on file     Social Determinants of Health     Financial Resource Strain:      Difficulty of Paying Living Expenses:    Food Insecurity:      Worried About Running Out of Food in the Last Year:      Ran Out of Food in the Last Year:    Transportation Needs:      Lack of Transportation (Medical):      Lack of Transportation (Non-Medical):    Physical Activity:      Days of Exercise per Week:      Minutes of Exercise per Session:    Stress:      Feeling of Stress :    Social Connections:      Frequency of Communication with Friends and Family:      Frequency of Social Gatherings with Friends and Family:      Attends Temple Services:      Active Member of Clubs or Organizations:      Attends Club or Organization Meetings:      Marital Status:    Intimate Partner Violence:      Fear of Current or Ex-Partner:      Emotionally Abused:      Physically Abused:      Sexually Abused:        Outpatient Encounter Medications as of 10/11/2021   Medication Sig Dispense Refill     budesonide (ENTOCORT EC) 3 MG EC capsule Take 3 capsules (9 mg) by mouth every morning 270 capsule 1     gabapentin (NEURONTIN) 100 MG capsule Take 1 capsule (100 mg) by mouth daily 90 capsule 1     gabapentin (NEURONTIN) 300 MG capsule Take 1 capsule (300 mg) by mouth At Bedtime 90 capsule 1     lisinopril (ZESTRIL) 20 MG tablet TAKE 1 TABLET DAILY 90 tablet 3     sertraline (ZOLOFT) 25 MG tablet Take 1 tablet (25 mg) by mouth daily 90 tablet 0     sertraline (ZOLOFT) 50  MG tablet Take 1 tablet (50 mg) by mouth daily 90 tablet 0     sertraline (ZOLOFT) 50 MG tablet TAKE 1 TABLET DAILY 90 tablet 0     No facility-administered encounter medications on file as of 10/11/2021.       Review Of Systems:  Skin: spots on chest  Eyes: negative  Ears/Nose/Throat: negative  Respiratory: No shortness of breath, dyspnea on exertion, cough, or hemoptysis  Cardiovascular: negative  Gastrointestinal: negative  Genitourinary: negative  Musculoskeletal: negative  Neurologic: negative  Psychiatric: negative  Hematologic/Lymphatic/Immunologic: negative  Endocrine: negative      Objective:     /86   LMP  (LMP Unknown)   Eyes: Conjunctivae/lids: Normal   ENT: Lips:  Normal  MSK: Normal  Cardiovascular: Peripheral edema none  Pulm: Breathing Normal  Neuro/Psych: Orientation: A/O x 3. Normal; Mood/Affect: Normal, NAD, WDWN  Pt accompanied by: self  Following areas examined: Scalp, face, eyelids, lips, neck, chest, abdomen, back, R&L upper and lower extremities, buttock, hips, groin and genitals.   Leonardo skin type:ii   Findings:  Red smooth well-defined macules on trunk and extremities.  Brown, stuck-on scaly appearing papules on chest,  trunk and extremities.  Well circumscribed macules with symmetric color distribution on trunk and extremities.  Tan WD smooth macules on face, neck, trunk, and extremities.  Flesh-colored verrucous appearing papule/s on left thumb lateral nail fold   Yellow firm patch with divot on left ball of foot    Assessment and Plan:     1) Cherry angiomas, Seborrheic keratoses, Benign nevi, Lentigines     I discussed the specifics of tumor, prognosis, and genetics of benign lesions.  I explained that treatment of these lesions would be purely cosmetic and not medically neccessary.  I discussed with patient different removal options including excision, cryotherapy, cautery and /or laser.  Lesion may recur and/or may not completely resolve. May need additional treatment.      2) corn, wart  Treatment options include Cimetidine, Aldara, Efudex, OTC topicals, wart peel, metaplast tape, candida injection, Bleomycin, cantharidin topical, cryo therapy, excision, and electrocautery.   LN2: Treated with LN2 for 5s for 1-2 cycles. Warned risks of blistering, pain, pigment change, scarring, and incomplete resolution.  Advised patient to return if lesions do not completely resolve within 2-3 months.  Wound care sheet given.  Continue otc tx  3) history of BCC  No evidence of recurrence  Signs and Symptoms of non-melanoma skin cancer and ABCDEs of melanoma reviewed with patient. Patient encouraged to perform monthly self skin exams and educated on how to perform them. UV precautions reviewed with patient. Patient was asked about new or changing moles/lesions on body.   Wear a sunscreen with at least SPF 30 on your face, ears, neck and V of the chest daily. Wear sunscreen on other areas of the body if those areas are exposed to the sun throughout the day. Sunscreens can contain physical and/or chemical blockers. Physical blockers are less likely to clog pores, these include zinc oxide and titanium dioxide. Reapply every two hour and after swimming.Sunscreen examples: https://www.ewg.org/sunscreen/    Proper skin care from [facility]:    -Eliminate harsh soaps as they strip the natural oils from the skin, often resulting in dry itchy skin ( i.e. Dial, Zest, Hattie Spring)  -Use mild soaps such as Cetaphil or Dove Sensitive Skin in the shower. You do not need to use soap on arms, legs, and trunk every time you shower unless visibly soiled.   -Avoid hot or cold showers.  -After showering, lightly dry off and apply moisturizing within 2-3 minutes. This will help trap moisture in the skin.   -Aggressive use of a moisturizer at least 1-2 times a day to the entire body (including -Vanicream, Cetaphil, Aquaphor or Cerave) and moisturize hands after every washing.  -We recommend using  moisturizers that come in a tub that needs to be scooped out, not a pump. This has more of an oil base. It will hold moisture in your skin much better than a water base moisturizer. The above recommended are non-pore clogging.         It was a pleasure speaking with Cindy Mota today.       Follow up in yearly

## 2021-10-11 NOTE — LETTER
10/11/2021         RE: Cindy Mota  [address] [facility] [address]        Dear Colleague,    Thank you for referring your patient, Cindy Mota, to the [facility]. Please see a copy of my visit note below.    HPI:  Cindy Mota is a 61 year old female patient here today for FBE. Has some spots on chest .  Patient states this has been present for a while.  Patient reports the following symptoms: itches at times .  Patient reports the following previous treatments: none.  Patient reports the following modifying factors: none.  Associated symptoms: none.  Patient has no other skin complaints today.  Remainder of the HPI, Meds, PMH, Allergies, FH, and SH was reviewed in chart.    Pertinent Hx:   BCC on abdomen  Past Medical History:   Diagnosis Date     IBS (irritable bowel syndrome)        Past Surgical History:   Procedure Laterality Date     C GYN EXAM, EST PT., ANNUAL  1/2012     HC LAPAROSCOPY, SURGICAL; CHOLECYSTECTOMY  8/2010    Cholecystectomy, Laparoscopic     HC TOOTH EXTRACTION W/FORCEP  age 33    wisdom     HYSTERECTOMY, PAP NO LONGER INDICATED  May 2007    Ovaries are in     MAMMO SALVADOR BILATERAL  1/2012    [facility]     ZZHC COLONOSCOPY THRU STOMA, DIAGNOSTIC  July 2010    normal, repeat in 10 years        Family History   Problem Relation Age of Onset     Neurologic Disorder Mother 54        Pick's Disease     Prostate Cancer Father      Arthritis Father      C.A.D. Father 63        stent     Hypertension Father      Lipids Father      Coronary Artery Disease Father      Lipids Sister      Hypertension Sister      Diabetes No family hx of      Cerebrovascular Disease No family hx of        Social History     Socioeconomic History     Marital status:      Spouse name: Not on file     Number of children: 2     Years of education: Not on file     Highest education level: Not on file   Occupational History     Employer: [employer]   Tobacco Use     Smoking status: Never Smoker     Smokeless tobacco: Never Used   Substance and Sexual Activity     Alcohol use: Yes     Comment: Socially     Drug use: No     Sexual activity: Yes     Partners: Male     Birth control/protection: Surgical     Comment: hysterectomy   Other Topics Concern     Parent/sibling w/ CABG, MI or angioplasty before 65F 55M? Not Asked      Service No     Blood Transfusions No     Caffeine Concern No     Occupational Exposure No     Hobby Hazards No     Sleep Concern No     Stress Concern No     Weight Concern No     Special Diet Yes     Comment: low fat     Back Care No     Exercise Yes     Comment: walks     Bike Helmet Not Asked     Seat Belt Yes     Self-Exams Yes   Social History Narrative     Not on file     Social Determinants of Health     Financial Resource Strain:      Difficulty of Paying Living Expenses:    Food Insecurity:      Worried About Running Out of Food in the Last Year:      Ran Out of Food in the Last Year:    Transportation Needs:      Lack of Transportation (Medical):      Lack of Transportation (Non-Medical):    Physical Activity:      Days of Exercise per Week:      Minutes of Exercise per Session:    Stress:      Feeling of Stress :    Social Connections:      Frequency of Communication with Friends and Family:      Frequency of Social Gatherings with Friends and Family:      Attends Anabaptist Services:      Active Member of Clubs or Organizations:      Attends Club or Organization Meetings:      Marital Status:    Intimate Partner Violence:      Fear of Current or Ex-Partner:      Emotionally Abused:      Physically Abused:      Sexually Abused:        Outpatient Encounter Medications as of 10/11/2021   Medication Sig Dispense Refill     budesonide (ENTOCORT EC) 3 MG EC capsule Take 3 capsules (9 mg) by mouth every morning 270 capsule 1     gabapentin (NEURONTIN) 100 MG capsule Take 1 capsule (100 mg) by mouth daily 90 capsule 1      gabapentin (NEURONTIN) 300 MG capsule Take 1 capsule (300 mg) by mouth At Bedtime 90 capsule 1     lisinopril (ZESTRIL) 20 MG tablet TAKE 1 TABLET DAILY 90 tablet 3     sertraline (ZOLOFT) 25 MG tablet Take 1 tablet (25 mg) by mouth daily 90 tablet 0     sertraline (ZOLOFT) 50 MG tablet Take 1 tablet (50 mg) by mouth daily 90 tablet 0     sertraline (ZOLOFT) 50 MG tablet TAKE 1 TABLET DAILY 90 tablet 0     No facility-administered encounter medications on file as of 10/11/2021.       Review Of Systems:  Skin: spots on chest  Eyes: negative  Ears/Nose/Throat: negative  Respiratory: No shortness of breath, dyspnea on exertion, cough, or hemoptysis  Cardiovascular: negative  Gastrointestinal: negative  Genitourinary: negative  Musculoskeletal: negative  Neurologic: negative  Psychiatric: negative  Hematologic/Lymphatic/Immunologic: negative  Endocrine: negative      Objective:     /86   LMP  (LMP Unknown)   Eyes: Conjunctivae/lids: Normal   ENT: Lips:  Normal  MSK: Normal  Cardiovascular: Peripheral edema none  Pulm: Breathing Normal  Neuro/Psych: Orientation: A/O x 3. Normal; Mood/Affect: Normal, NAD, WDWN  Pt accompanied by: self  Following areas examined: Scalp, face, eyelids, lips, neck, chest, abdomen, back, R&L upper and lower extremities, buttock, hips, groin and genitals.   Leonardo skin type:ii   Findings:  Red smooth well-defined macules on trunk and extremities.  Brown, stuck-on scaly appearing papules on chest,  trunk and extremities.  Well circumscribed macules with symmetric color distribution on trunk and extremities.  Tan WD smooth macules on face, neck, trunk, and extremities.  Flesh-colored verrucous appearing papule/s on left thumb lateral nail fold   Yellow firm patch with divot on left ball of foot    Assessment and Plan:     1) Cherry angiomas, Seborrheic keratoses, Benign nevi, Lentigines     I discussed the specifics of tumor, prognosis, and genetics of benign lesions.  I explained  that treatment of these lesions would be purely cosmetic and not medically neccessary.  I discussed with patient different removal options including excision, cryotherapy, cautery and /or laser.  Lesion may recur and/or may not completely resolve. May need additional treatment.     2) corn, wart  Treatment options include Cimetidine, Aldara, Efudex, OTC topicals, wart peel, metaplast tape, candida injection, Bleomycin, cantharidin topical, cryo therapy, excision, and electrocautery.   LN2: Treated with LN2 for 5s for 1-2 cycles. Warned risks of blistering, pain, pigment change, scarring, and incomplete resolution.  Advised patient to return if lesions do not completely resolve within 2-3 months.  Wound care sheet given.  Continue otc tx  3) history of BCC  No evidence of recurrence  Signs and Symptoms of non-melanoma skin cancer and ABCDEs of melanoma reviewed with patient. Patient encouraged to perform monthly self skin exams and educated on how to perform them. UV precautions reviewed with patient. Patient was asked about new or changing moles/lesions on body.   Wear a sunscreen with at least SPF 30 on your face, ears, neck and V of the chest daily. Wear sunscreen on other areas of the body if those areas are exposed to the sun throughout the day. Sunscreens can contain physical and/or chemical blockers. Physical blockers are less likely to clog pores, these include zinc oxide and titanium dioxide. Reapply every two hour and after swimming.Sunscreen examples: https://www.ewg.org/sunscreen/    Proper skin care from [facility]:    -Eliminate harsh soaps as they strip the natural oils from the skin, often resulting in dry itchy skin ( i.e. Dial, Zest, Hattie Spring)  -Use mild soaps such as Cetaphil or Dove Sensitive Skin in the shower. You do not need to use soap on arms, legs, and trunk every time you shower unless visibly soiled.   -Avoid hot or cold showers.  -After showering, lightly dry off and apply  moisturizing within 2-3 minutes. This will help trap moisture in the skin.   -Aggressive use of a moisturizer at least 1-2 times a day to the entire body (including -Vanicream, Cetaphil, Aquaphor or Cerave) and moisturize hands after every washing.  -We recommend using moisturizers that come in a tub that needs to be scooped out, not a pump. This has more of an oil base. It will hold moisture in your skin much better than a water base moisturizer. The above recommended are non-pore clogging.         It was a pleasure speaking with Cindy Mota today.       Follow up in yearly         Again, thank you for allowing me to participate in the care of your patient.        Sincerely,        Kenya Woodruff PA-C

## 2021-10-12 ENCOUNTER — OFFICE VISIT (OUTPATIENT)
Dept: FAMILY MEDICINE | Facility: CLINIC | Age: 61
End: 2021-10-12

## 2021-10-12 VITALS
WEIGHT: 148.2 LBS | OXYGEN SATURATION: 97 % | HEIGHT: 65 IN | TEMPERATURE: 98 F | DIASTOLIC BLOOD PRESSURE: 82 MMHG | HEART RATE: 61 BPM | BODY MASS INDEX: 24.69 KG/M2 | SYSTOLIC BLOOD PRESSURE: 126 MMHG

## 2021-10-12 DIAGNOSIS — K52.831 COLLAGENOUS COLITIS: ICD-10-CM

## 2021-10-12 DIAGNOSIS — N95.1 MENOPAUSAL SYNDROME (HOT FLASHES): ICD-10-CM

## 2021-10-12 DIAGNOSIS — Z23 NEED FOR INFLUENZA VACCINATION: ICD-10-CM

## 2021-10-12 DIAGNOSIS — N95.1 SYMPTOMATIC MENOPAUSAL OR FEMALE CLIMACTERIC STATES: ICD-10-CM

## 2021-10-12 DIAGNOSIS — M19.041 OSTEOARTHRITIS OF BOTH HANDS, UNSPECIFIED OSTEOARTHRITIS TYPE: ICD-10-CM

## 2021-10-12 DIAGNOSIS — I10 ESSENTIAL HYPERTENSION: Primary | ICD-10-CM

## 2021-10-12 DIAGNOSIS — F41.1 GENERALIZED ANXIETY DISORDER: ICD-10-CM

## 2021-10-12 DIAGNOSIS — M19.042 OSTEOARTHRITIS OF BOTH HANDS, UNSPECIFIED OSTEOARTHRITIS TYPE: ICD-10-CM

## 2021-10-12 PROCEDURE — 90686 IIV4 VACC NO PRSV 0.5 ML IM: CPT | Performed by: FAMILY MEDICINE

## 2021-10-12 PROCEDURE — 99214 OFFICE O/P EST MOD 30 MIN: CPT | Mod: 25 | Performed by: FAMILY MEDICINE

## 2021-10-12 PROCEDURE — 90471 IMMUNIZATION ADMIN: CPT | Performed by: FAMILY MEDICINE

## 2021-10-12 RX ORDER — SERTRALINE HYDROCHLORIDE 25 MG/1
25 TABLET, FILM COATED ORAL DAILY
Qty: 90 TABLET | Refills: 1 | Status: SHIPPED | OUTPATIENT
Start: 2021-10-12 | End: 2022-05-10

## 2021-10-12 RX ORDER — GABAPENTIN 300 MG/1
300 CAPSULE ORAL AT BEDTIME
Qty: 90 CAPSULE | Refills: 1 | Status: SHIPPED | OUTPATIENT
Start: 2021-10-12 | End: 2022-05-10

## 2021-10-12 RX ORDER — BUDESONIDE 3 MG/1
3 CAPSULE, COATED PELLETS ORAL EVERY MORNING
Qty: 90 CAPSULE | Refills: 1 | Status: SHIPPED | OUTPATIENT
Start: 2021-10-12 | End: 2022-05-10

## 2021-10-12 RX ORDER — GABAPENTIN 100 MG/1
100 CAPSULE ORAL DAILY
Qty: 90 CAPSULE | Refills: 1 | Status: SHIPPED | OUTPATIENT
Start: 2021-10-12 | End: 2022-05-10

## 2021-10-12 ASSESSMENT — ANXIETY QUESTIONNAIRES
5. BEING SO RESTLESS THAT IT IS HARD TO SIT STILL: NOT AT ALL
6. BECOMING EASILY ANNOYED OR IRRITABLE: NOT AT ALL
7. FEELING AFRAID AS IF SOMETHING AWFUL MIGHT HAPPEN: NOT AT ALL
3. WORRYING TOO MUCH ABOUT DIFFERENT THINGS: NOT AT ALL
GAD7 TOTAL SCORE: 0
IF YOU CHECKED OFF ANY PROBLEMS ON THIS QUESTIONNAIRE, HOW DIFFICULT HAVE THESE PROBLEMS MADE IT FOR YOU TO DO YOUR WORK, TAKE CARE OF THINGS AT HOME, OR GET ALONG WITH OTHER PEOPLE: NOT DIFFICULT AT ALL
2. NOT BEING ABLE TO STOP OR CONTROL WORRYING: NOT AT ALL
1. FEELING NERVOUS, ANXIOUS, OR ON EDGE: NOT AT ALL

## 2021-10-12 ASSESSMENT — PATIENT HEALTH QUESTIONNAIRE - PHQ9
5. POOR APPETITE OR OVEREATING: NOT AT ALL
SUM OF ALL RESPONSES TO PHQ QUESTIONS 1-9: 1

## 2021-10-12 ASSESSMENT — MIFFLIN-ST. JEOR: SCORE: 1235.17

## 2021-10-12 NOTE — PROGRESS NOTES
"    Assessment & Plan     Essential hypertension  Well controlled, continue current medications at current doses     Generalized anxiety disorder  Well controlled, continue current medications at current doses   - sertraline (ZOLOFT) 25 MG tablet  Dispense: 90 tablet; Refill: 1  - sertraline (ZOLOFT) 50 MG tablet  Dispense: 90 tablet; Refill: 1    Collagenous colitis  Well controlled, continue current medications at current doses   - budesonide (ENTOCORT EC) 3 MG EC capsule  Dispense: 90 capsule; Refill: 1    Symptomatic menopausal or female climacteric states  Stable, continue current medications at current doses   - gabapentin (NEURONTIN) 300 MG capsule  Dispense: 90 capsule; Refill: 1    Menopausal syndrome (hot flashes)    - gabapentin (NEURONTIN) 100 MG capsule  Dispense: 90 capsule; Refill: 1    Osteoarthritis of both hands, unspecified osteoarthritis type  Discussed options, ok to try diclofenac    Need for influenza vaccination    - FLU VAC PRESRV FREE QUAD SPLIT VIR 3+YRS IM      Review of the result(s) of each unique test - labs  Ordering of each unique test  Prescription drug management         BMI:   Estimated body mass index is 25.05 kg/m  as calculated from the following:    Height as of this encounter: 1.638 m (5' 4.5\").    Weight as of this encounter: 67.2 kg (148 lb 3.2 oz).       FUTURE APPOINTMENTS:       - Follow-up visit in 6 mo  Regular exercise    No follow-ups on file.    Luis Dan MD  [facility]    Rony White is a 61 year old who presents for the following health issues     HPI     Hypertension Follow-up      Do you check your blood pressure regularly outside of the clinic? Yes     Are you following a low salt diet? No    Are your blood pressures ever more than 140 on the top number (systolic) OR more   than 90 on the bottom number (diastolic), for example 140/90? No    Anxiety Follow-Up    How are you doing with your anxiety since your last visit? " "No change    Are you having other symptoms that might be associated with anxiety? No    Have you had a significant life event? No     Are you feeling depressed? No    Do you have any concerns with your use of alcohol or other drugs? No    Social History     Tobacco Use     Smoking status: Never Smoker     Smokeless tobacco: Never Used   Substance Use Topics     Alcohol use: Yes     Comment: Socially     Drug use: No     ANDREW-7 SCORE 7/25/2019   Total Score 0     PHQ 6/27/2016 7/25/2019   PHQ-9 Total Score 0 2   Q9: Thoughts of better off dead/self-harm past 2 weeks Not at all Not at all     See mood screeners    Pt noting some aching in hands, has started diclofenac gel wants to know if OK to try    HOt flashes, gabapentin seems to work well    Colitis stable, pt down to one entocort daily now, trying to wean      How many servings of fruits and vegetables do you eat daily?  2-3    On average, how many sweetened beverages do you drink each day (Examples: soda, juice, sweet tea, etc.  Do NOT count diet or artificially sweetened beverages)?   1    How many days per week do you exercise enough to make your heart beat faster? 6    How many minutes a day do you exercise enough to make your heart beat faster? 30 - 60    How many days per week do you miss taking your medication? 0        Review of Systems   Constitutional, HEENT, cardiovascular, pulmonary, gi and gu systems are negative, except as otherwise noted.      Objective    /82 (BP Location: Right arm, Patient Position: Sitting, Cuff Size: Adult Regular)   Pulse 61   Temp 98  F (36.7  C) (Temporal)   Ht 1.638 m (5' 4.5\")   Wt 67.2 kg (148 lb 3.2 oz)   LMP  (LMP Unknown)   SpO2 97%   BMI 25.05 kg/m    Body mass index is 25.05 kg/m .  Physical Exam   GENERAL: healthy, alert and no distress  NECK: no adenopathy, no asymmetry, masses, or scars and thyroid normal to palpation  RESP: lungs clear to auscultation - no rales, rhonchi or wheezes  CV: regular rate " and rhythm, normal S1 S2, no S3 or S4, no murmur, click or rub, no peripheral edema and peripheral pulses strong  ABDOMEN: soft, nontender, no hepatosplenomegaly, no masses and bowel sounds normal  MS: no gross musculoskeletal defects noted, no edema  SKIN: no suspicious lesions or rashes  PSYCH: mentation appears normal, affect normal/bright    Office Visit on 04/14/2021   Component Date Value Ref Range Status     Color Urine 04/14/2021 Other   Final     Appearance Urine 04/14/2021 Clear   Final     Glucose Urine 04/14/2021 Neg  neg mg/dL Final     Bilirubin Urine 04/14/2021 Neg  neg Final     Ketones Urine 04/14/2021 Neg  neg mg/dL Final     Specific Gravity 04/14/2021 1.015   Final     Blood Urine 04/14/2021 Trace* neg Final     pH Urine 04/14/2021 6.0  5.0 - 7.0 pH Final     Albumin Urine 04/14/2021 neg  neg - neg mg/dL Final     Urobilinogen Urine 04/14/2021 0.2  0.2 - 1.0 EU/dL Final     Nitrite Urine 04/14/2021 Neg  NEG Final     Leukocyte Esterase 04/14/2021 neg  neg - neg Final     Wbc, Urine Micro 04/14/2021 0-2  neg - 2 Final     RBC Micro Urine 04/14/2021 0-1  neg - 2 Final     EP/HPF 04/14/2021 OCC   Final     Bacteria Urine 04/14/2021 FEW* neg - neg Final     Casts/LPF 04/14/2021 neg   Final     Miscellaneous 04/14/2021 neg   Final

## 2021-10-12 NOTE — NURSING NOTE
Chief Complaint   Patient presents with     Recheck Medication     non-fasting today     Pre-visit Screening:  Immunizations:  up to date  Colonoscopy:  is up to date  Mammogram: is up to date  Asthma Action Test/Plan:  NA  PHQ9:  Done today  GAD7:  Done today  Questioned patient about current smoking habits Pt. has never smoked.  Ok to leave detailed message on voice mail for today's visit only Yes, phone # [phone]

## 2021-10-13 ASSESSMENT — ANXIETY QUESTIONNAIRES: GAD7 TOTAL SCORE: 0

## 2021-11-03 ENCOUNTER — ANCILLARY PROCEDURE (OUTPATIENT)
Dept: MAMMOGRAPHY | Facility: CLINIC | Age: 61
End: 2021-11-03
Attending: FAMILY MEDICINE
Payer: COMMERCIAL

## 2021-11-03 DIAGNOSIS — Z12.31 VISIT FOR SCREENING MAMMOGRAM: ICD-10-CM

## 2021-11-03 PROCEDURE — 77063 BREAST TOMOSYNTHESIS BI: CPT | Mod: TC | Performed by: RADIOLOGY

## 2021-11-03 PROCEDURE — 77067 SCR MAMMO BI INCL CAD: CPT | Mod: TC | Performed by: RADIOLOGY

## 2022-01-22 ENCOUNTER — HEALTH MAINTENANCE LETTER (OUTPATIENT)
Age: 62
End: 2022-01-22

## 2022-02-23 ENCOUNTER — TELEPHONE (OUTPATIENT)
Dept: FAMILY MEDICINE | Facility: CLINIC | Age: 62
End: 2022-02-23

## 2022-02-23 NOTE — TELEPHONE ENCOUNTER
Thinks that she has a sty or Chalazion on her eye. Has been using warm compresses for a few day.   Informed that it could take up to a month for it to get better. Continue with the compresses 2-4 times a day for 10-15 minutes. Can use a clean Q-tip to lightly press on the area to get the oil duct unplugged.    If not better after 1 month, to call and be seen.

## 2022-04-07 DIAGNOSIS — F41.1 GENERALIZED ANXIETY DISORDER: ICD-10-CM

## 2022-04-07 DIAGNOSIS — N95.1 MENOPAUSAL SYNDROME (HOT FLASHES): ICD-10-CM

## 2022-04-07 DIAGNOSIS — N95.1 SYMPTOMATIC MENOPAUSAL OR FEMALE CLIMACTERIC STATES: ICD-10-CM

## 2022-04-08 RX ORDER — GABAPENTIN 300 MG/1
CAPSULE ORAL
Qty: 90 CAPSULE | Refills: 3 | COMMUNITY
Start: 2022-04-08

## 2022-04-08 RX ORDER — GABAPENTIN 100 MG/1
CAPSULE ORAL
Qty: 90 CAPSULE | Refills: 3 | COMMUNITY
Start: 2022-04-08

## 2022-04-08 NOTE — TELEPHONE ENCOUNTER
Cindy Mota is requesting a refill of:    Refused Prescriptions:                       Disp   Refills    sertraline (ZOLOFT) 50 MG tablet [Pharmacy*90 tab*3        Sig: TAKE 1 TABLET DAILY  Refused By: HUBERT GALLO  Reason for Refusal: Patient needs appointment    gabapentin (NEURONTIN) 100 MG capsule [Pha*90 cap*3        Sig: TAKE 1 CAPSULE DAILY (TAKE IN ADDITION  MG           TABLET TO EQUAL 400 MG TOTAL)  Refused By: HUBERT GALLO  Reason for Refusal: Patient needs appointment    gabapentin (NEURONTIN) 300 MG capsule [Pha*90 cap*3        Sig: TAKE 1 CAPSULE AT BEDTIME  Refused By: HUBERT GALLO  Reason for Refusal: Patient needs appointment    Needs OV for refills

## 2022-05-10 ENCOUNTER — OFFICE VISIT (OUTPATIENT)
Dept: FAMILY MEDICINE | Facility: CLINIC | Age: 62
End: 2022-05-10

## 2022-05-10 VITALS
SYSTOLIC BLOOD PRESSURE: 122 MMHG | HEIGHT: 65 IN | TEMPERATURE: 97.1 F | RESPIRATION RATE: 20 BRPM | HEART RATE: 76 BPM | DIASTOLIC BLOOD PRESSURE: 82 MMHG | WEIGHT: 151 LBS | BODY MASS INDEX: 25.16 KG/M2

## 2022-05-10 DIAGNOSIS — K52.831 COLLAGENOUS COLITIS: ICD-10-CM

## 2022-05-10 DIAGNOSIS — G25.81 RESTLESS LEG: ICD-10-CM

## 2022-05-10 DIAGNOSIS — F41.1 GENERALIZED ANXIETY DISORDER: Primary | ICD-10-CM

## 2022-05-10 DIAGNOSIS — N95.1 SYMPTOMATIC MENOPAUSAL OR FEMALE CLIMACTERIC STATES: ICD-10-CM

## 2022-05-10 DIAGNOSIS — I10 ESSENTIAL HYPERTENSION: ICD-10-CM

## 2022-05-10 DIAGNOSIS — N95.1 MENOPAUSAL SYNDROME (HOT FLASHES): ICD-10-CM

## 2022-05-10 DIAGNOSIS — Z71.89 ACP (ADVANCE CARE PLANNING): ICD-10-CM

## 2022-05-10 DIAGNOSIS — G47.00 INSOMNIA, UNSPECIFIED TYPE: ICD-10-CM

## 2022-05-10 LAB
ALBUMIN SERPL-MCNC: 4.2 G/DL (ref 3.6–5.1)
ALBUMIN/GLOB SERPL: 1.6 {RATIO} (ref 1–2.5)
ALP SERPL-CCNC: 63 U/L (ref 33–130)
ALT 1742-6: 13 U/L (ref 0–32)
AST 1920-8: 19 U/L (ref 0–35)
BILIRUB SERPL-MCNC: 0.5 MG/DL (ref 0.2–1.2)
BUN SERPL-MCNC: 13 MG/DL (ref 7–25)
BUN/CREATININE RATIO: 16 (ref 6–22)
CALCIUM SERPL-MCNC: 9.2 MG/DL (ref 8.6–10.3)
CHLORIDE SERPLBLD-SCNC: 106.7 MMOL/L (ref 98–110)
CHOLEST SERPL-MCNC: 220 MG/DL (ref 0–199)
CHOLEST/HDLC SERPL: 3 {RATIO} (ref 0–5)
CREAT SERPL-MCNC: 0.81 MG/DL (ref 0.6–1.3)
GLOBULIN, CALCULATED - QUEST: 2.6 (ref 1.9–3.7)
GLUCOSE SERPL-MCNC: 97 MG/DL (ref 60–99)
HDLC SERPL-MCNC: 87 MG/DL (ref 40–150)
LDLC SERPL CALC-MCNC: 117 MG/DL (ref 0–130)
POTASSIUM SERPL-SCNC: 4.7 MMOL/L (ref 3.5–5.3)
PROT SERPL-MCNC: 6.8 G/DL (ref 6.1–8.1)
SODIUM SERPL-SCNC: 139.5 MMOL/L (ref 135–146)
TRIGL SERPL-MCNC: 82 MG/DL (ref 0–149)

## 2022-05-10 PROCEDURE — 83540 ASSAY OF IRON: CPT | Performed by: FAMILY MEDICINE

## 2022-05-10 PROCEDURE — 82728 ASSAY OF FERRITIN: CPT | Mod: 90 | Performed by: FAMILY MEDICINE

## 2022-05-10 PROCEDURE — 80053 COMPREHEN METABOLIC PANEL: CPT | Performed by: FAMILY MEDICINE

## 2022-05-10 PROCEDURE — 80061 LIPID PANEL: CPT | Performed by: FAMILY MEDICINE

## 2022-05-10 PROCEDURE — 36415 COLL VENOUS BLD VENIPUNCTURE: CPT | Performed by: FAMILY MEDICINE

## 2022-05-10 PROCEDURE — 99214 OFFICE O/P EST MOD 30 MIN: CPT | Performed by: FAMILY MEDICINE

## 2022-05-10 PROCEDURE — 83550 IRON BINDING TEST: CPT | Mod: 90 | Performed by: FAMILY MEDICINE

## 2022-05-10 RX ORDER — SERTRALINE HYDROCHLORIDE 25 MG/1
25 TABLET, FILM COATED ORAL DAILY
Qty: 90 TABLET | Refills: 1 | Status: SHIPPED | OUTPATIENT
Start: 2022-05-10 | End: 2022-11-01

## 2022-05-10 RX ORDER — GABAPENTIN 100 MG/1
100 CAPSULE ORAL DAILY
Qty: 90 CAPSULE | Refills: 1 | Status: SHIPPED | OUTPATIENT
Start: 2022-05-10 | End: 2022-11-01

## 2022-05-10 RX ORDER — FAMOTIDINE 20 MG/1
20 TABLET, FILM COATED ORAL 2 TIMES DAILY
COMMUNITY
Start: 2022-05-10 | End: 2023-11-07

## 2022-05-10 RX ORDER — PHENOL 1.4 %
10 AEROSOL, SPRAY (ML) MUCOUS MEMBRANE
COMMUNITY
Start: 2022-05-10

## 2022-05-10 RX ORDER — LISINOPRIL 20 MG/1
20 TABLET ORAL DAILY
Qty: 90 TABLET | Refills: 3 | Status: SHIPPED | OUTPATIENT
Start: 2022-05-10 | End: 2023-04-26

## 2022-05-10 RX ORDER — GABAPENTIN 300 MG/1
300 CAPSULE ORAL AT BEDTIME
Qty: 90 CAPSULE | Refills: 1 | Status: SHIPPED | OUTPATIENT
Start: 2022-05-10 | End: 2022-11-01

## 2022-05-10 RX ORDER — TRAZODONE HYDROCHLORIDE 50 MG/1
50 TABLET, FILM COATED ORAL AT BEDTIME
Qty: 30 TABLET | Refills: 0 | Status: SHIPPED | OUTPATIENT
Start: 2022-05-10 | End: 2022-11-01

## 2022-05-10 ASSESSMENT — ANXIETY QUESTIONNAIRES
GAD7 TOTAL SCORE: 1
6. BECOMING EASILY ANNOYED OR IRRITABLE: NOT AT ALL
2. NOT BEING ABLE TO STOP OR CONTROL WORRYING: NOT AT ALL
1. FEELING NERVOUS, ANXIOUS, OR ON EDGE: NOT AT ALL
3. WORRYING TOO MUCH ABOUT DIFFERENT THINGS: NOT AT ALL
IF YOU CHECKED OFF ANY PROBLEMS ON THIS QUESTIONNAIRE, HOW DIFFICULT HAVE THESE PROBLEMS MADE IT FOR YOU TO DO YOUR WORK, TAKE CARE OF THINGS AT HOME, OR GET ALONG WITH OTHER PEOPLE: NOT DIFFICULT AT ALL
7. FEELING AFRAID AS IF SOMETHING AWFUL MIGHT HAPPEN: NOT AT ALL
5. BEING SO RESTLESS THAT IT IS HARD TO SIT STILL: SEVERAL DAYS

## 2022-05-10 ASSESSMENT — PATIENT HEALTH QUESTIONNAIRE - PHQ9
5. POOR APPETITE OR OVEREATING: NOT AT ALL
SUM OF ALL RESPONSES TO PHQ QUESTIONS 1-9: 1

## 2022-05-10 NOTE — NURSING NOTE
Cindy Mota is here for a medication check and refill. Stopped the Budesonide for now and is not having Sx. Will call us back for a refill if she starts the medicaiton again.    Questioned patient about current smoking habits.  Pt. has never smoked.  PULSE regular  My Chart: active  CLASSIFICATION OF OVERWEIGHT AND OBESITY BY BMI                        Obesity Class           BMI(kg/m2)  Underweight                                    < 18.5  Normal                                         18.5-24.9  Overweight                                     25.0-29.9  OBESITY                     I                  30.0-34.9                             II                 35.0-39.9  EXTREME OBESITY             III                >40                            Patient's  BMI Body mass index is 25.52 kg/m .  http://hin.nhlbi.nih.gov/menuplanner/menu.cgi  Pre-visit planning  Immunizations - up to date  Colonoscopy - is up to date  Mammogram - is up to date  Asthma -   PHQ9 -    ANDREW-7 -

## 2022-05-10 NOTE — PROGRESS NOTES
"  Assessment & Plan     Generalized anxiety disorder  Well controlled overall, continue current medications at current doses   - sertraline (ZOLOFT) 50 MG tablet  Dispense: 90 tablet; Refill: 1  - sertraline (ZOLOFT) 25 MG tablet  Dispense: 90 tablet; Refill: 1    Symptomatic menopausal or female climacteric states  Well controlled, continue current medications at current doses   - gabapentin (NEURONTIN) 300 MG capsule  Dispense: 90 capsule; Refill: 1    Essential hypertension  Well controlled, continue current medications at current doses   - lisinopril (ZESTRIL) 20 MG tablet  Dispense: 90 tablet; Refill: 3  - Lipid Panel (BFP)  - Comprehensive Metobolic Panel (BFP)  - VENOUS COLLECTION    Collagenous colitis  Well controlled, at present off meds    Menopausal syndrome (hot flashes)    - gabapentin (NEURONTIN) 100 MG capsule  Dispense: 90 capsule; Refill: 1    ACP (advance care planning)      Insomnia, unspecified type  New issue-discussed options-will trial trazodone, if effective may consider selective serotonin reuptake inhibitor change due to slightl risk serotonin syndrome  - traZODone (DESYREL) 50 MG tablet  Dispense: 30 tablet; Refill: 0    Restless leg  Pt with some mild symptoms suggestive of RLS-hopefully improved insomnia may minimize this but check labs for iron issues for now, can consider meds in future  - VENOUS COLLECTION  - IRON AND IRON BINDING CAPACITY ([facility])  - FERRITIN ([facility])      Review of external notes as documented elsewhere in note  Review of the result(s) of each unique test - labs  Ordering of each unique test  Prescription drug management         BMI:   Estimated body mass index is 25.52 kg/m  as calculated from the following:    Height as of this encounter: 1.638 m (5' 4.5\").    Weight as of this encounter: 68.5 kg (151 lb).       FUTURE APPOINTMENTS:       - Follow-up visit in 6 mo  Regular exercise    No follow-ups on file.    Luis Dan MD  [facility] " JOE Uribe   Cindy is a 62 year old who presents for the following health issues     HPI     Hypertension Follow-up      Do you check your blood pressure regularly outside of the clinic? No     Are you following a low salt diet? No    Are your blood pressures ever more than 140 on the top number (systolic) OR more   than 90 on the bottom number (diastolic), for example 140/90? No    Anxiety Follow-Up-some anxiousness about getting to sleep, hand aches-wonders a little about restless leg syndrome     How are you doing with your anxiety since your last visit? No change    Are you having other symptoms that might be associated with anxiety? No    Have you had a significant life event? No     Are you feeling depressed? No    Do you have any concerns with your use of alcohol or other drugs? No    Social History     Tobacco Use     Smoking status: Never Smoker     Smokeless tobacco: Never Used   Substance Use Topics     Alcohol use: Yes     Comment: Socially     Drug use: No     ANDREW-7 SCORE 7/25/2019 10/12/2021   Total Score 0 0     PHQ 6/27/2016 7/25/2019 10/12/2021   PHQ-9 Total Score 0 2 1   Q9: Thoughts of better off dead/self-harm past 2 weeks Not at all Not at all Not at all     See mood screens from today    Hot flashes-stable with gabapentin, no side effects     Colitis- stopped entocort and still well controlled        How many servings of fruits and vegetables do you eat daily?  2-3    On average, how many sweetened beverages do you drink each day (Examples: soda, juice, sweet tea, etc.  Do NOT count diet or artificially sweetened beverages)?   1    How many days per week do you exercise enough to make your heart beat faster? 3 or less    How many minutes a day do you exercise enough to make your heart beat faster? 20 - 29    How many days per week do you miss taking your medication? 0        Review of Systems   Constitutional, HEENT, cardiovascular, pulmonary, gi and gu systems are negative,  "except as otherwise noted.      Objective    /82 (BP Location: Right arm, Patient Position: Chair, Cuff Size: Adult Regular)   Pulse 76   Temp 97.1  F (36.2  C) (Temporal)   Resp 20   Ht 1.638 m (5' 4.5\")   Wt 68.5 kg (151 lb)   LMP  (LMP Unknown)   BMI 25.52 kg/m    Body mass index is 25.52 kg/m .  Physical Exam   GENERAL: healthy, alert and no distress  EYES: Eyes grossly normal to inspection, PERRL and conjunctivae and sclerae normal  HENT: ear canals and TM's normal, nose and mouth without ulcers or lesions  NECK: no adenopathy, no asymmetry, masses, or scars and thyroid normal to palpation  RESP: lungs clear to auscultation - no rales, rhonchi or wheezes  CV: regular rate and rhythm, normal S1 S2, no S3 or S4, no murmur, click or rub, no peripheral edema and peripheral pulses strong  ABDOMEN: soft, nontender, no hepatosplenomegaly, no masses and bowel sounds normal  MS: no gross musculoskeletal defects noted, no edema  NEURO: Normal strength and tone, mentation intact and speech normal  PSYCH: mentation appears normal, affect normal/bright    Mood screens today scanned  Office Visit on 04/14/2021   Component Date Value Ref Range Status     Color Urine 04/14/2021 Other   Final     Appearance Urine 04/14/2021 Clear   Final     Glucose Urine 04/14/2021 Neg  neg mg/dL Final     Bilirubin Urine 04/14/2021 Neg  neg Final     Ketones Urine 04/14/2021 Neg  neg mg/dL Final     Specific Gravity 04/14/2021 1.015   Final     Blood Urine 04/14/2021 Trace (A) neg Final     pH Urine 04/14/2021 6.0  5.0 - 7.0 pH Final     Albumin Urine 04/14/2021 neg  neg - neg mg/dL Final     Urobilinogen Urine 04/14/2021 0.2  0.2 - 1.0 EU/dL Final     Nitrite Urine 04/14/2021 Neg  NEG Final     Leukocyte Esterase 04/14/2021 neg  neg - neg Final     Wbc, Urine Micro 04/14/2021 0-2  neg - 2 Final     RBC Micro Urine 04/14/2021 0-1  neg - 2 Final     EP/HPF 04/14/2021 OCC   Final     Bacteria Urine 04/14/2021 FEW (A) neg - neg " Final     Casts/LPF 04/14/2021 neg   Final     Miscellaneous 04/14/2021 neg   Final

## 2022-05-11 LAB
% SATURATION - QUEST: 11 % (CALC) (ref 16–45)
FERRITIN SERPL-MCNC: 6 NG/ML (ref 16–288)
IRON: 52 MCG/DL (ref 45–160)
TIBC - QUEST: 478 MCG/DL (CALC) (ref 250–450)

## 2022-05-11 ASSESSMENT — ANXIETY QUESTIONNAIRES: GAD7 TOTAL SCORE: 1

## 2022-05-12 ENCOUNTER — TELEPHONE (OUTPATIENT)
Dept: FAMILY MEDICINE | Facility: CLINIC | Age: 62
End: 2022-05-12

## 2022-05-12 DIAGNOSIS — E61.1 IRON DEFICIENCY: Primary | ICD-10-CM

## 2022-05-12 RX ORDER — FERROUS SULFATE 325(65) MG
325 TABLET ORAL
Qty: 30 TABLET | Refills: 1 | Status: SHIPPED | OUTPATIENT
Start: 2022-05-12 | End: 2022-07-07

## 2022-05-12 NOTE — TELEPHONE ENCOUNTER
----- Message from Reina Watters MLT sent at 5/12/2022  8:16 AM CDT -----  Sorry we can't add CBC, it has to be run within 24 hours.     ----- Message -----  From: Luis Dan MD  Sent: 5/11/2022   3:06 PM CDT  To: Kat Waddell MA, Reina Watters MLT    Can you add CBC

## 2022-05-12 NOTE — TELEPHONE ENCOUNTER
D/w pt, iron levels all low, pt had donated blood within last 6 weeks and was not told anemic    Will start iron, recheck labs in a month    Pt understands plan    She denies any recent blood loss, fatigue-had normal colonosocpy a year ago

## 2022-06-06 DIAGNOSIS — G47.00 INSOMNIA, UNSPECIFIED TYPE: ICD-10-CM

## 2022-06-07 RX ORDER — TRAZODONE HYDROCHLORIDE 50 MG/1
TABLET, FILM COATED ORAL
Qty: 30 TABLET | Refills: 0 | COMMUNITY
Start: 2022-06-07

## 2022-06-07 NOTE — TELEPHONE ENCOUNTER
Sent Jarrellt    Cindy Mota is requesting a refill of:    Refused Prescriptions:                       Disp   Refills    traZODone (DESYREL) 50 MG tablet [Pharmacy*30 tab*0        Sig: TAKE 1 TABLET BY MOUTH AT BEDTIME  Refused By: HUBERT GALLO  Reason for Refusal: OTHER  Reason for Refusal Comment: Pt does not want

## 2022-06-15 DIAGNOSIS — E61.1 IRON DEFICIENCY: ICD-10-CM

## 2022-06-15 LAB
% GRANULOCYTES: 70.6 %
HCT VFR BLD AUTO: 42.5 % (ref 35–47)
HEMOGLOBIN: 13.4 G/DL (ref 11.7–15.7)
LYMPHOCYTES NFR BLD AUTO: 21.2 %
MCH RBC QN AUTO: 29.3 PG (ref 26–33)
MCHC RBC AUTO-ENTMCNC: 31.5 G/DL (ref 31–36)
MCV RBC AUTO: 92.8 FL (ref 78–100)
MONOCYTES NFR BLD AUTO: 8.2 %
PLATELET COUNT - QUEST: 303 10^9/L (ref 150–375)
RBC # BLD AUTO: 4.58 10*12/L (ref 3.8–5.2)
WBC # BLD AUTO: 10.1 10*9/L (ref 4–11)

## 2022-06-15 PROCEDURE — 83550 IRON BINDING TEST: CPT | Mod: 90 | Performed by: FAMILY MEDICINE

## 2022-06-15 PROCEDURE — 83540 ASSAY OF IRON: CPT | Mod: 90 | Performed by: FAMILY MEDICINE

## 2022-06-15 PROCEDURE — 36415 COLL VENOUS BLD VENIPUNCTURE: CPT | Performed by: FAMILY MEDICINE

## 2022-06-15 PROCEDURE — 82728 ASSAY OF FERRITIN: CPT | Mod: 90 | Performed by: FAMILY MEDICINE

## 2022-06-15 PROCEDURE — 85025 COMPLETE CBC W/AUTO DIFF WBC: CPT | Performed by: FAMILY MEDICINE

## 2022-06-16 ENCOUNTER — OFFICE VISIT (OUTPATIENT)
Dept: FAMILY MEDICINE | Facility: CLINIC | Age: 62
End: 2022-06-16

## 2022-06-16 VITALS
HEIGHT: 65 IN | SYSTOLIC BLOOD PRESSURE: 136 MMHG | HEART RATE: 72 BPM | WEIGHT: 150 LBS | BODY MASS INDEX: 24.99 KG/M2 | DIASTOLIC BLOOD PRESSURE: 72 MMHG | TEMPERATURE: 97.1 F | RESPIRATION RATE: 20 BRPM

## 2022-06-16 DIAGNOSIS — H69.91 DYSFUNCTION OF RIGHT EUSTACHIAN TUBE: Primary | ICD-10-CM

## 2022-06-16 LAB
% SATURATION - QUEST: 32 % (CALC) (ref 16–45)
FERRITIN SERPL-MCNC: 13 NG/ML (ref 16–288)
IRON: 145 MCG/DL (ref 45–160)
TIBC - QUEST: 456 MCG/DL (CALC) (ref 250–450)

## 2022-06-16 PROCEDURE — 99213 OFFICE O/P EST LOW 20 MIN: CPT | Performed by: FAMILY MEDICINE

## 2022-06-16 NOTE — NURSING NOTE
Cindy Novoafrancesca is here for right ear fullness.    Questioned patient about current smoking habits.  Pt. has never smoked.  PULSE regular  My Chart: XRIVE  CLASSIFICATION OF OVERWEIGHT AND OBESITY BY BMI                        Obesity Class           BMI(kg/m2)  Underweight                                    < 18.5  Normal                                         18.5-24.9  Overweight                                     25.0-29.9  OBESITY                     I                  30.0-34.9                             II                 35.0-39.9  EXTREME OBESITY             III                >40                            Patient's  BMI Body mass index is 25.35 kg/m .  http://hin.nhlbi.nih.gov/menuplanner/menu.cgi  Pre-visit planning  Immunizations - up to date  Colonoscopy - is up to date  Mammogram - is up to date  Asthma -   PHQ9 -    ANDREW-7 -

## 2022-06-16 NOTE — PROGRESS NOTES
"SUBJECTIVE:   Cindy Mota is a 62 year old female who complains of right ear hearing change-sound s like \"speaker went out\" and tinnitus for 4 days. She denies a history of sweats, chills and vomiting and denies a history of asthma. Patient does not smoke cigarettes.    Pt denies ear trauma    Pt did fly on a plane last week to attend sisters      OBJECTIVE:/72 (BP Location: Right arm, Patient Position: Chair, Cuff Size: Adult Regular)   Pulse 72   Temp 97.1  F (36.2  C) (Temporal)   Resp 20   Ht 1.638 m (5' 4.5\")   Wt 68 kg (150 lb)   LMP  (LMP Unknown)   BMI 25.35 kg/m     She appears well, vital signs are as noted by the nurse. Ears normal.  Throat and pharynx normal.  Neck supple. No adenopathy in the neck. Nose is congested. Sinuses non tender. The chest is clear, without wheezes or rales.    ASSESSMENT:   (H69.81) Dysfunction of right eustachian tube  (primary encounter diagnosis)  Comment: discussed diagnosis  Plan: push fluids       PLAN:  Symptomatic therapy suggested: push fluids and rest. Call or return to clinic prn if these symptoms worsen or fail to improve as anticipated.   "

## 2022-07-07 DIAGNOSIS — E61.1 IRON DEFICIENCY: ICD-10-CM

## 2022-07-07 RX ORDER — FERROUS SULFATE 325(65) MG
TABLET ORAL
Qty: 90 TABLET | Refills: 0 | Status: SHIPPED | OUTPATIENT
Start: 2022-07-07 | End: 2022-09-07

## 2022-07-07 NOTE — TELEPHONE ENCOUNTER
Please advise. Iron was rechecked on 06/15/22 your note in labs says continue iron supplement for 3 more months.    Cindy Mota is requesting a refill of:    Pending Prescriptions:                       Disp   Refills    ferrous sulfate (FEROSUL) 325 (65 Fe) MG *90 tab*0            Sig: TAKE 1 TABLET BY MOUTH EVERY DAY WITH BREAKFAST

## 2022-07-20 DIAGNOSIS — K52.831 COLLAGENOUS COLITIS: ICD-10-CM

## 2022-07-21 RX ORDER — BUDESONIDE 3 MG/1
CAPSULE, COATED PELLETS ORAL
Qty: 270 CAPSULE | Refills: 3 | COMMUNITY
Start: 2022-07-21

## 2022-07-21 NOTE — TELEPHONE ENCOUNTER
Cindy Mota is requesting a refill of:    Refused Prescriptions:                       Disp   Refills    budesonide (ENTOCORT EC) 3 MG EC capsule [*270 ca*3        Sig: TAKE 3 CAPSULES EVERY MORNING  Refused By: HUBERT GALLO  Reason for Refusal: OTHER  Reason for Refusal Comment: No longer taking

## 2022-08-31 DIAGNOSIS — E61.1 IRON DEFICIENCY: Primary | ICD-10-CM

## 2022-09-02 DIAGNOSIS — E61.1 IRON DEFICIENCY: ICD-10-CM

## 2022-09-02 LAB
% GRANULOCYTES: 52.8 %
HCT VFR BLD AUTO: 41.8 % (ref 35–47)
HEMOGLOBIN: 13.7 G/DL (ref 11.7–15.7)
LYMPHOCYTES NFR BLD AUTO: 35.6 %
MCH RBC QN AUTO: 30.5 PG (ref 26–33)
MCHC RBC AUTO-ENTMCNC: 32.8 G/DL (ref 31–36)
MCV RBC AUTO: 93.2 FL (ref 78–100)
MONOCYTES NFR BLD AUTO: 11.6 %
PLATELET COUNT - QUEST: 283 10^9/L (ref 150–375)
RBC # BLD AUTO: 4.49 10*12/L (ref 3.8–5.2)
WBC # BLD AUTO: 7.5 10*9/L (ref 4–11)

## 2022-09-02 PROCEDURE — 82728 ASSAY OF FERRITIN: CPT | Mod: 90 | Performed by: FAMILY MEDICINE

## 2022-09-02 PROCEDURE — 83540 ASSAY OF IRON: CPT | Mod: 90 | Performed by: FAMILY MEDICINE

## 2022-09-02 PROCEDURE — 83550 IRON BINDING TEST: CPT | Mod: 90 | Performed by: FAMILY MEDICINE

## 2022-09-02 PROCEDURE — 85025 COMPLETE CBC W/AUTO DIFF WBC: CPT | Performed by: FAMILY MEDICINE

## 2022-09-02 PROCEDURE — 36415 COLL VENOUS BLD VENIPUNCTURE: CPT | Performed by: FAMILY MEDICINE

## 2022-09-03 LAB
% SATURATION - QUEST: 26 % (CALC) (ref 16–45)
FERRITIN SERPL-MCNC: 16 NG/ML (ref 16–288)
IRON: 112 MCG/DL (ref 45–160)
TIBC - QUEST: 424 MCG/DL (CALC) (ref 250–450)

## 2022-09-06 ENCOUNTER — MYC MEDICAL ADVICE (OUTPATIENT)
Dept: FAMILY MEDICINE | Facility: CLINIC | Age: 62
End: 2022-09-06

## 2022-09-06 DIAGNOSIS — K52.831 COLLAGENOUS COLITIS: ICD-10-CM

## 2022-09-06 DIAGNOSIS — E61.1 IRON DEFICIENCY: ICD-10-CM

## 2022-09-07 RX ORDER — FERROUS SULFATE 325(65) MG
TABLET ORAL
Qty: 90 TABLET | Refills: 1 | Status: SHIPPED | OUTPATIENT
Start: 2022-09-07 | End: 2024-03-19

## 2022-09-07 RX ORDER — BUDESONIDE 3 MG/1
3 CAPSULE, COATED PELLETS ORAL EVERY MORNING
Qty: 90 CAPSULE | Refills: 1 | Status: SHIPPED | OUTPATIENT
Start: 2022-09-07 | End: 2023-03-01

## 2022-10-06 DIAGNOSIS — E61.1 IRON DEFICIENCY: ICD-10-CM

## 2022-10-06 RX ORDER — FERROUS SULFATE 325(65) MG
TABLET ORAL
Qty: 90 TABLET | Refills: 1 | COMMUNITY
Start: 2022-10-06

## 2022-10-06 NOTE — TELEPHONE ENCOUNTER
Cindy Mota is requesting a refill of:    Refused Prescriptions:                       Disp   Refills    ferrous sulfate (FEROSUL) 325 (65 Fe) MG t*90 tab*1        Sig: TAKE 1 TABLET BY MOUTH EVERY DAY WITH BREAKFAST  Refused By: HUBERT GALLO  Reason for Refusal: OTHER

## 2022-10-26 ENCOUNTER — MYC MEDICAL ADVICE (OUTPATIENT)
Dept: FAMILY MEDICINE | Facility: CLINIC | Age: 62
End: 2022-10-26

## 2022-10-26 DIAGNOSIS — F41.1 GENERALIZED ANXIETY DISORDER: ICD-10-CM

## 2022-10-26 NOTE — TELEPHONE ENCOUNTER
Cindy Mota is requesting a refill of:    Refused Prescriptions:                       Disp   Refills    sertraline (ZOLOFT) 50 MG tablet [Pharmacy*90 tab*3        Sig: TAKE 1 TABLET DAILY  Refused By: HUBERT GALLO  Reason for Refusal: Patient needs appointment    Needs OV for refills

## 2022-11-01 ENCOUNTER — OFFICE VISIT (OUTPATIENT)
Dept: FAMILY MEDICINE | Facility: CLINIC | Age: 62
End: 2022-11-01

## 2022-11-01 VITALS
HEIGHT: 65 IN | BODY MASS INDEX: 25.39 KG/M2 | TEMPERATURE: 97.1 F | WEIGHT: 152.4 LBS | HEART RATE: 68 BPM | SYSTOLIC BLOOD PRESSURE: 120 MMHG | DIASTOLIC BLOOD PRESSURE: 72 MMHG | RESPIRATION RATE: 20 BRPM

## 2022-11-01 DIAGNOSIS — I10 ESSENTIAL HYPERTENSION: Primary | ICD-10-CM

## 2022-11-01 DIAGNOSIS — N95.1 MENOPAUSAL SYNDROME (HOT FLASHES): ICD-10-CM

## 2022-11-01 DIAGNOSIS — Z23 FLU VACCINE NEED: ICD-10-CM

## 2022-11-01 DIAGNOSIS — R79.0 LOW FERRITIN: ICD-10-CM

## 2022-11-01 DIAGNOSIS — K52.831 COLLAGENOUS COLITIS: ICD-10-CM

## 2022-11-01 DIAGNOSIS — F41.1 GENERALIZED ANXIETY DISORDER: ICD-10-CM

## 2022-11-01 DIAGNOSIS — N95.1 SYMPTOMATIC MENOPAUSAL OR FEMALE CLIMACTERIC STATES: ICD-10-CM

## 2022-11-01 LAB
BUN SERPL-MCNC: 12 MG/DL (ref 7–25)
BUN/CREATININE RATIO: 13.3 (ref 6–22)
CALCIUM SERPL-MCNC: 9.5 MG/DL (ref 8.6–10.3)
CHLORIDE SERPLBLD-SCNC: 105.3 MMOL/L (ref 98–110)
CO2 SERPL-SCNC: 29 MMOL/L (ref 20–32)
CREAT SERPL-MCNC: 0.9 MG/DL (ref 0.6–1.3)
GLUCOSE SERPL-MCNC: 118 MG/DL (ref 60–99)
POTASSIUM SERPL-SCNC: 4.24 MMOL/L (ref 3.5–5.3)
SODIUM SERPL-SCNC: 140.4 MMOL/L (ref 135–146)

## 2022-11-01 PROCEDURE — 82728 ASSAY OF FERRITIN: CPT | Mod: 90 | Performed by: FAMILY MEDICINE

## 2022-11-01 PROCEDURE — 99214 OFFICE O/P EST MOD 30 MIN: CPT | Mod: 25 | Performed by: FAMILY MEDICINE

## 2022-11-01 PROCEDURE — 80048 BASIC METABOLIC PNL TOTAL CA: CPT | Performed by: FAMILY MEDICINE

## 2022-11-01 PROCEDURE — 90471 IMMUNIZATION ADMIN: CPT | Performed by: FAMILY MEDICINE

## 2022-11-01 PROCEDURE — 36415 COLL VENOUS BLD VENIPUNCTURE: CPT | Performed by: FAMILY MEDICINE

## 2022-11-01 PROCEDURE — 90686 IIV4 VACC NO PRSV 0.5 ML IM: CPT | Performed by: FAMILY MEDICINE

## 2022-11-01 RX ORDER — SERTRALINE HYDROCHLORIDE 25 MG/1
25 TABLET, FILM COATED ORAL DAILY
Qty: 90 TABLET | Refills: 1 | Status: SHIPPED | OUTPATIENT
Start: 2022-11-01 | End: 2023-04-26

## 2022-11-01 RX ORDER — GABAPENTIN 100 MG/1
100 CAPSULE ORAL DAILY
Qty: 90 CAPSULE | Refills: 1 | Status: SHIPPED | OUTPATIENT
Start: 2022-11-01 | End: 2023-03-01

## 2022-11-01 RX ORDER — GABAPENTIN 300 MG/1
300 CAPSULE ORAL AT BEDTIME
Qty: 90 CAPSULE | Refills: 1 | Status: SHIPPED | OUTPATIENT
Start: 2022-11-01 | End: 2023-04-26

## 2022-11-01 RX ORDER — FERROUS GLUCONATE 324(38)MG
324 TABLET ORAL
Qty: 90 TABLET | Refills: 1 | Status: SHIPPED | OUTPATIENT
Start: 2022-11-01 | End: 2023-03-01

## 2022-11-01 NOTE — NURSING NOTE
Cindy Mota is here for a medication check and flu shot.    Questioned patient about current smoking habits.  Pt. has never smoked.  PULSE regular  My Chart: active  CLASSIFICATION OF OVERWEIGHT AND OBESITY BY BMI                        Obesity Class           BMI(kg/m2)  Underweight                                    < 18.5  Normal                                         18.5-24.9  Overweight                                     25.0-29.9  OBESITY                     I                  30.0-34.9                             II                 35.0-39.9  EXTREME OBESITY             III                >40                            Patient's  BMI Body mass index is 25.76 kg/m .  http://hin.nhlbi.nih.gov/menuplanner/menu.cgi  Pre-visit planning  Immunizations - up to date  Colonoscopy - is up to date  Mammogram - is up to date  Asthma -   PHQ9 -    ANDREW-7 -      The patient has verbalized that it is ok to leave a detailed voice message on the patient's cell phone with results/recommendations from this visit.

## 2022-11-01 NOTE — PROGRESS NOTES
"  Assessment & Plan     Essential hypertension  Well controlled, continue current medications at current doses   - Basic Metabolic Panel (BFP)  - VENOUS COLLECTION    Generalized anxiety disorder  Well controlled, continue current medications at current doses , taking 25 am, 50 mg pm  - sertraline (ZOLOFT) 50 MG tablet  Dispense: 90 tablet; Refill: 1  - sertraline (ZOLOFT) 25 MG tablet  Dispense: 90 tablet; Refill: 1    Symptomatic menopausal or female climacteric states  Well controlled, has been able to wean gabapentin to 300 mg daily, will try to wean further  - gabapentin (NEURONTIN) 300 MG capsule  Dispense: 90 capsule; Refill: 1    Collagenous colitis  Stable, uses budesonide on and off    Menopausal syndrome (hot flashes)    - gabapentin (NEURONTIN) 100 MG capsule  Dispense: 90 capsule; Refill: 1    Low ferritin  Insurance sent letter-no longer covering ferrous sulfate-will change to gluconate    Restless symptoms in legs better on iron, recheck ferritin today  - ferrous gluconate (FERGON) 324 (38 Fe) MG tablet  Dispense: 90 tablet; Refill: 1  - FERRITIN ([facility])  - VENOUS COLLECTION    Flu vaccine need    - FLU VAC PRESRV FREE QUAD SPLIT VIR 3+YRS IM      Review of the result(s) of each unique test - labs  Ordering of each unique test  Prescription drug management         BMI:   Estimated body mass index is 25.76 kg/m  as calculated from the following:    Height as of this encounter: 1.638 m (5' 4.5\").    Weight as of this encounter: 69.1 kg (152 lb 6.4 oz).       FUTURE APPOINTMENTS:       - Follow-up visit in 6 mo  Regular exercise    No follow-ups on file.    Luis Dan MD  [facility]    Rony White is a 62 year old, presenting for the following health issues:  Recheck Medication      HPI     Hypertension Follow-up      Do you check your blood pressure regularly outside of the clinic? Yes     Are you following a low salt diet? No    Are your blood pressures ever " more than 140 on the top number (systolic) OR more   than 90 on the bottom number (diastolic), for example 140/90? No    Anxiety Follow-Up    How are you doing with your anxiety since your last visit? No change    Are you having other symptoms that might be associated with anxiety? No    Have you had a significant life event? No     Are you feeling depressed? No    Do you have any concerns with your use of alcohol or other drugs? No    Social History     Tobacco Use     Smoking status: Never     Smokeless tobacco: Never   Substance Use Topics     Alcohol use: Yes     Comment: Socially     Drug use: No     ANDREW-7 SCORE 7/25/2019 10/12/2021 5/10/2022   Total Score 0 0 1     PHQ 7/25/2019 10/12/2021 5/10/2022   PHQ-9 Total Score 2 1 1   Q9: Thoughts of better off dead/self-harm past 2 weeks Not at all Not at all Not at all     ANDREW-7  5/10/2022   1. Feeling nervous, anxious, or on edge 0   2. Not being able to stop or control worrying 0   3. Worrying too much about different things 0   4. Trouble relaxing 0   5. Being so restless that it is hard to sit still 1   6. Becoming easily annoyed or irritable 0   7. Feeling afraid, as if something awful might happen 0   ANDREW-7 Total Score 1   If you checked any problems, how difficult have they made it for you to do your work, take care of things at home, or get along with other people? Not difficult at all         Pt using gabapentin for menopausal symptoms -has decreased to 300 mg once daily-planning to wean down more    Colitis doing well with prn budesonide    How many servings of fruits and vegetables do you eat daily?  2-3    On average, how many sweetened beverages do you drink each day (Examples: soda, juice, sweet tea, etc.  Do NOT count diet or artificially sweetened beverages)?   1    How many days per week do you exercise enough to make your heart beat faster? 4    How many minutes a day do you exercise enough to make your heart beat faster? 30 - 60    How many days  "per week do you miss taking your medication? 0        Review of Systems   Constitutional, HEENT, cardiovascular, pulmonary, gi and gu systems are negative, except as otherwise noted.      Objective    /72 (BP Location: Right arm, Patient Position: Chair, Cuff Size: Adult Regular)   Pulse 68   Temp 97.1  F (36.2  C) (Temporal)   Resp 20   Ht 1.638 m (5' 4.5\")   Wt 69.1 kg (152 lb 6.4 oz)   LMP  (LMP Unknown)   BMI 25.76 kg/m    Body mass index is 25.76 kg/m .  Physical Exam   GENERAL: healthy, alert and no distress  NECK: no adenopathy, no asymmetry, masses, or scars and thyroid normal to palpation  RESP: lungs clear to auscultation - no rales, rhonchi or wheezes  CV: regular rate and rhythm, normal S1 S2, no S3 or S4, no murmur, click or rub, no peripheral edema and peripheral pulses strong  ABDOMEN: soft, nontender, no hepatosplenomegaly, no masses and bowel sounds normal  MS: no gross musculoskeletal defects noted, no edema  PSYCH: mentation appears normal, affect normal/bright    Orders Only on 09/02/2022   Component Date Value Ref Range Status     Iron 09/02/2022 112  45 - 160 mcg/dL Final     TIBC 09/02/2022 424  250 - 450 mcg/dL (calc) Final     % Saturation 09/02/2022 26  16 - 45 % (calc) Final     Ferritin 09/02/2022 16  16 - 288 ng/mL Final     WBC 09/02/2022 7.5  4.0 - 11 10*9/L Final     RBC Count 09/02/2022 4.49  3.8 - 5.2 10*12/L Final     Hemoglobin 09/02/2022 13.7  11.7 - 15.7 g/dL Final     Hematocrit 09/02/2022 41.8  35.0 - 47.0 % Final     MCV 09/02/2022 93.2  78 - 100 fL Final     MCH 09/02/2022 30.5  26 - 33 pg Final     MCHC 09/02/2022 32.8  31 - 36 g/dL Final     Platelet Count 09/02/2022 283  150 - 375 10^9/L Final     % Granulocytes 09/02/2022 52.8  % Final     % Lymphocytes 09/02/2022 35.6  % Final     % Monocytes 09/02/2022 11.6  % Final                     "

## 2022-11-02 LAB — FERRITIN SERPL-MCNC: 19 NG/ML (ref 16–288)

## 2022-12-05 ENCOUNTER — OFFICE VISIT (OUTPATIENT)
Dept: FAMILY MEDICINE | Facility: CLINIC | Age: 62
End: 2022-12-05

## 2022-12-05 DIAGNOSIS — U07.1 INFECTION DUE TO 2019 NOVEL CORONAVIRUS: Primary | ICD-10-CM

## 2022-12-05 PROCEDURE — 99214 OFFICE O/P EST MOD 30 MIN: CPT | Mod: GT | Performed by: PHYSICIAN ASSISTANT

## 2022-12-05 RX ORDER — NIRMATRELVIR AND RITONAVIR 300-100 MG
3 KIT ORAL 2 TIMES DAILY
Qty: 30 EACH | Refills: 0 | Status: SHIPPED | OUTPATIENT
Start: 2022-12-05 | End: 2023-01-13

## 2022-12-05 NOTE — PROGRESS NOTES
Assessment & Plan     Infection due to 2019 novel coronavirus-discussed risk vs benefits and side effects of medication and patient wishes to proceed with prescription therapy   Patient will hold budesonide while on Paxlovid, she is only taking sparingly now  -Rest, increase fluids, honey for cough suppression/sore throat  -Add Mucinex and Sudafed D for symptomatic relief  -Ibuprofen/Tylenol as needed for symptomatic relief  Seek emergency care for significant shortness of breath, chest pain, and/or fever >103F that cannot be controlled with antipyretics   - nirmatrelvir and ritonavir (PAXLOVID, 300/100,) therapy pack  Dispense: 30 each; Refill: 0    Quarantine through 12/7, mask through 12/12       Follow up as needed    No follow-ups on file.    Jez Lawson PA-C  [facility]    Subjective   Cindy is a 62 year old, presenting for the following health issues:  No chief complaint on file.      HPI     Video-Visit Details    Type of service:  Video Visit    Video Start Time (time video started): 1304    Video End Time (time video stopped): 1316    Originating Location (pt. Location): Home        Distant Location (provider location):  On-site    [facility] of [facility]:  [facility]    Physician has received verbal consent for a Video Visit from the patient? Yes      Jez Lawson PA-C      COVID-19 Symptom Review  How many days ago did these symptoms start? 12/2/22 12/4/22 positive test  Are any of the following symptoms significant for you?    New or worsening difficulty breathing? No    Worsening cough? Yes, it's a dry cough.     Fever or chills? Yes, I felt feverish or had chills.    Headache: YES    Sore throat: No    Chest pain: No    Diarrhea: No    Body aches? YES    What treatments has patient tried? Acetaminophen and Nonsteroidals, Dayquil, Sudafed  Does patient live in a nursing home, group home, or shelter? No  Does patient have a way to get food/medications during quarantined? Yes, I have  a friend or family member who can help me.          Last Cr normal 11/22  Patient is taking budesonide every 3 days.     Review of Systems   Constitutional, HEENT, cardiovascular, pulmonary, gi and gu systems are negative, except as otherwise noted.      Objective    LMP  (LMP Unknown)   There is no height or weight on file to calculate BMI.  Physical Exam   GENERAL: healthy, alert and no distress  NECK: no asymmetry, masses, or scars, supple  RESP: no respiratory distress, breathing at normal rate  MS: no gross musculoskeletal defects noted  SKIN: no suspicious lesions or rashes    Exam limited by camera placement and telemedicine visit.

## 2022-12-15 ENCOUNTER — ANCILLARY PROCEDURE (OUTPATIENT)
Dept: MAMMOGRAPHY | Facility: CLINIC | Age: 62
End: 2022-12-15
Attending: FAMILY MEDICINE
Payer: COMMERCIAL

## 2022-12-15 PROCEDURE — 77067 SCR MAMMO BI INCL CAD: CPT | Mod: TC | Performed by: RADIOLOGY

## 2022-12-15 PROCEDURE — 77063 BREAST TOMOSYNTHESIS BI: CPT | Mod: TC | Performed by: RADIOLOGY

## 2023-01-13 ENCOUNTER — OFFICE VISIT (OUTPATIENT)
Dept: FAMILY MEDICINE | Facility: CLINIC | Age: 63
End: 2023-01-13

## 2023-01-13 VITALS
WEIGHT: 148 LBS | HEIGHT: 65 IN | TEMPERATURE: 97.9 F | HEART RATE: 79 BPM | OXYGEN SATURATION: 97 % | DIASTOLIC BLOOD PRESSURE: 72 MMHG | SYSTOLIC BLOOD PRESSURE: 122 MMHG | BODY MASS INDEX: 24.66 KG/M2

## 2023-01-13 DIAGNOSIS — M25.562 CHRONIC PAIN OF BOTH KNEES: ICD-10-CM

## 2023-01-13 DIAGNOSIS — N30.00 ACUTE CYSTITIS WITHOUT HEMATURIA: ICD-10-CM

## 2023-01-13 DIAGNOSIS — M25.50 MULTIPLE JOINT PAIN: ICD-10-CM

## 2023-01-13 DIAGNOSIS — R39.9 URINARY SYMPTOM OR SIGN: Primary | ICD-10-CM

## 2023-01-13 DIAGNOSIS — H93.13 TINNITUS, BILATERAL: ICD-10-CM

## 2023-01-13 DIAGNOSIS — M25.552 BILATERAL HIP PAIN: ICD-10-CM

## 2023-01-13 DIAGNOSIS — M25.561 CHRONIC PAIN OF BOTH KNEES: ICD-10-CM

## 2023-01-13 DIAGNOSIS — M25.551 BILATERAL HIP PAIN: ICD-10-CM

## 2023-01-13 DIAGNOSIS — G89.29 CHRONIC PAIN OF BOTH KNEES: ICD-10-CM

## 2023-01-13 LAB
APPEARANCE UR: ABNORMAL
BACTERIA, UR: ABNORMAL
BILIRUB UR QL: ABNORMAL
CASTS/LPF: ABNORMAL
COLOR UR: YELLOW
EP/HPF: ABNORMAL
ERYTHROCYTE [SEDIMENTATION RATE] IN BLOOD: 6 MM/HR (ref 0–20)
GLUCOSE URINE: ABNORMAL MG/DL
HGB UR QL: ABNORMAL
KETONES UR QL: ABNORMAL MG/DL
MISC.: ABNORMAL
NITRITE UR QL STRIP: ABNORMAL
PH UR STRIP: 6.5 PH (ref 5–7)
PROT UR QL: ABNORMAL MG/DL
RBC, UR MICRO: ABNORMAL (ref ?–2)
SP GR UR STRIP: 1.02 (ref 1–1.03)
UROBILINOGEN UR QL STRIP: 0.2 EU/DL (ref 0.2–1)
WBC #/AREA URNS HPF: ABNORMAL /[HPF]
WBC, UR MICRO: ABNORMAL (ref ?–2)

## 2023-01-13 PROCEDURE — 87186 SC STD MICRODIL/AGAR DIL: CPT | Mod: 90 | Performed by: FAMILY MEDICINE

## 2023-01-13 PROCEDURE — 87088 URINE BACTERIA CULTURE: CPT | Mod: 90 | Performed by: FAMILY MEDICINE

## 2023-01-13 PROCEDURE — 87086 URINE CULTURE/COLONY COUNT: CPT | Mod: 90 | Performed by: FAMILY MEDICINE

## 2023-01-13 PROCEDURE — 81001 URINALYSIS AUTO W/SCOPE: CPT | Performed by: FAMILY MEDICINE

## 2023-01-13 PROCEDURE — 86038 ANTINUCLEAR ANTIBODIES: CPT | Mod: 90 | Performed by: FAMILY MEDICINE

## 2023-01-13 PROCEDURE — 85651 RBC SED RATE NONAUTOMATED: CPT | Performed by: FAMILY MEDICINE

## 2023-01-13 PROCEDURE — 87077 CULTURE AEROBIC IDENTIFY: CPT | Mod: 90 | Performed by: FAMILY MEDICINE

## 2023-01-13 PROCEDURE — 86431 RHEUMATOID FACTOR QUANT: CPT | Mod: 90 | Performed by: FAMILY MEDICINE

## 2023-01-13 PROCEDURE — 36415 COLL VENOUS BLD VENIPUNCTURE: CPT | Performed by: FAMILY MEDICINE

## 2023-01-13 RX ORDER — CIPROFLOXACIN 500 MG/1
500 TABLET, FILM COATED ORAL 2 TIMES DAILY
Qty: 14 TABLET | Refills: 0 | Status: SHIPPED | OUTPATIENT
Start: 2023-01-13 | End: 2023-01-20

## 2023-01-13 NOTE — PROGRESS NOTES
Assessment & Plan   Problem List Items Addressed This Visit    None  Visit Diagnoses     Urinary symptom or sign    -  Primary    Relevant Orders    URINALYSIS, ROUTINE ([facility]) (Completed)    Tinnitus, bilateral        Relevant Orders    Adult ENT  Referral - To a [facility] Location (Use POS/Location)    Multiple joint pain        Relevant Orders    VENOUS COLLECTION (Completed)    GONZALO Scrn Rflx to Titer and Ptrn ([facility]) (Completed)    ESR WESTERGREN ([facility]) (Completed)    RHEUMATOID FACTOR ([facility])    Acute cystitis without hematuria        Relevant Medications    ciprofloxacin (CIPRO) 500 MG tablet    Other Relevant Orders    URINE CULTURE AEROBIC BACTERIAL ([facility]) (Completed)           1. Urinary symptom or sign    - URINALYSIS, ROUTINE ([facility])    2. Tinnitus, bilateral  Ongoing symptoms, referral done to ENT.  - Adult ENT  Referral - To a [facility] Location (Use POS/Location); Future    3. Multiple joint pain  Check labs, consider referral to rheumatology.  - VENOUS COLLECTION  - GONZALO Scrn Rflx to Titer [facility] Ptrn ([facility])  - ESR WESTERGREN ([facility])  - RHEUMATOID FACTOR ([facility])    4. Acute cystitis without hematuria  Probable uti. Treat with antibiotics.  - URINE CULTURE AEROBIC BACTERIAL ([facility])  - ciprofloxacin (CIPRO) 500 MG tablet; Take 1 tablet (500 mg) by mouth 2 times daily for 7 days  Dispense: 14 tablet; Refill: 0           FUTURE APPOINTMENTS:       - Follow-up visit per results.    No follow-ups on file.    Faye Watkins MD  [facility]    Subjective     Nursing Notes:   Sherrill Clemens CMA  1/13/2023 10:11 AM  Signed  Chief Complaint   Patient presents with     UTI     Urinary urgency and dysuria started yesterday morning, cloudiness in urine this morning      Referral     Referral to ENT for tinnitus      Pain     Arthritis pain, aching in joints over the last few months      Pre-visit Screening:  Immunizations:  up to date  Colonoscopy:  is up to  "date  Mammogram: is up to date  Asthma Action Test/Plan:  NA  PHQ9:  NA  GAD7:  NA  Questioned patient about current smoking habits Pt. has never smoked.  Ok to leave detailed message on voice mail for today's visit only Yes, phone # [phone]           Cindy Mota is a 62 year old female who presents to clinic today for the following health issues    HPI     Burning at end of urination, odor and cloudiness to the urination, started yesterday morning, up at night, and frequency. No fevers and no abd pain.    Wants a referral to ENT. Since summer struggling with tinnitus. But has had this for years. Having a low buzz or ring in the ears.  Worse after covid over the summer.gradually through the day seems worse.    Also has some questions about her joint pains arthritis into the thumbs and fingers, stiff joints, feels like they are swollen, worse over the years. Also in knees and hips. Taking ibuprofen.         Review of Systems   Constitutional, HEENT, cardiovascular, pulmonary, gi and gu systems are negative, except as otherwise noted.      Objective    /72 (BP Location: Right arm, Patient Position: Sitting, Cuff Size: Adult Large)   Pulse 79   Temp 97.9  F (36.6  C) (Temporal)   Ht 1.638 m (5' 4.5\")   Wt 67.1 kg (148 lb)   LMP  (LMP Unknown)   SpO2 97%   BMI 25.01 kg/m    Body mass index is 25.01 kg/m .  Physical Exam   GENERAL: healthy, alert and no distress  HENT: ear canals and TM's normal, nose and mouth without ulcers or lesions  RESP: lungs clear to auscultation - no rales, rhonchi or wheezes  CV: regular rate and rhythm, normal S1 S2, no S3 or S4, no murmur, click or rub, no peripheral edema and peripheral pulses strong  MS: no gross musculoskeletal defects noted, no edema  NEURO: Normal strength and tone, mentation intact and speech normal  PSYCH: mentation appears normal, affect normal/bright    Results for orders placed or performed in visit on 01/13/23   URINALYSIS, ROUTINE ([facility])     " Status: Abnormal   Result Value Ref Range    Color Urine Yellow     Appearance Urine Cloudy (A)     Glucose Urine Neg neg mg/dL    Bilirubin Urine Neg neg    Ketones Urine Neg neg mg/dL    Specific Gravity Urine 1.020 1.003 - 1.035    Blood Urine Trace (A) neg    pH Urine 6.5 5.0 - 7.0 pH    Protein Urine neg neg - neg mg/dL    Urobilinogen Urine 0.2 0.2 - 1.0 EU/dL    Nitrite Urine Pos (A) NEG    Leukocytes MOD (A)     Wbc, Urine Micro 20-40 (A) neg - 2    RBC Micro Urine 4-8 (A) neg - 2    EP/HPF FEW     Bacteria Urine LARGE-Valerio (A) neg - neg    Casts/LPF neg     Miscellaneous clumping of WBC's (A)    URINE CULTURE AEROBIC BACTERIAL ([facility])     Status: Abnormal (Preliminary result)   Result Value Ref Range    Urine Voided Culture SEE NOTE (A)    GONZALO Scrn Rflx to Titer and Ptrn ([facility])     Status: None   Result Value Ref Range    GONZALO Screen NEGATIVE NEGATIVE   ESR WESTERGREN ([facility])     Status: None   Result Value Ref Range    Sed Rate 6 0 - 20 mm/hr   RHEUMATOID FACTOR ([facility])     Status: None (In process)   Result Value Ref Range    Rheumatoid Factor SEE COMMENT

## 2023-01-13 NOTE — NURSING NOTE
Chief Complaint   Patient presents with     UTI     Urinary urgency and dysuria started yesterday morning, cloudiness in urine this morning      Referral     Referral to ENT for tinnitus      Pain     Arthritis pain, aching in joints over the last few months      Pre-visit Screening:  Immunizations:  up to date  Colonoscopy:  is up to date  Mammogram: is up to date  Asthma Action Test/Plan:  NA  PHQ9:  NA  GAD7:  NA  Questioned patient about current smoking habits Pt. has never smoked.  Ok to leave detailed message on voice mail for today's visit only Yes, phone # [phone]

## 2023-01-16 LAB
ANA SCREEN - QUEST: NEGATIVE
RHEUMATOID FACT SER NEPH-ACNC: <14 IU/ML (ref 0–20)
URINE VOIDED CULTURE: ABNORMAL

## 2023-01-23 RX ORDER — NITROFURANTOIN 25; 75 MG/1; MG/1
100 CAPSULE ORAL 2 TIMES DAILY
Qty: 14 CAPSULE | Refills: 0 | Status: SHIPPED | OUTPATIENT
Start: 2023-01-23 | End: 2023-01-30

## 2023-02-20 ENCOUNTER — TRANSFERRED RECORDS (OUTPATIENT)
Dept: FAMILY MEDICINE | Facility: CLINIC | Age: 63
End: 2023-02-20

## 2023-02-28 ENCOUNTER — OFFICE VISIT (OUTPATIENT)
Dept: FAMILY MEDICINE | Facility: CLINIC | Age: 63
End: 2023-02-28
Payer: COMMERCIAL

## 2023-02-28 DIAGNOSIS — B07.9 VERRUCA VULGARIS: ICD-10-CM

## 2023-02-28 DIAGNOSIS — D18.01 CHERRY ANGIOMA: ICD-10-CM

## 2023-02-28 DIAGNOSIS — H61.001 CHONDRODERMATITIS NODULARIS HELICIS OF RIGHT EAR: Primary | ICD-10-CM

## 2023-02-28 DIAGNOSIS — L82.1 SEBORRHEIC KERATOSIS: ICD-10-CM

## 2023-02-28 DIAGNOSIS — D22.9 MULTIPLE BENIGN NEVI: ICD-10-CM

## 2023-02-28 DIAGNOSIS — L82.0 SEBORRHEIC KERATOSIS, INFLAMED: ICD-10-CM

## 2023-02-28 DIAGNOSIS — L81.4 SOLAR LENTIGO: ICD-10-CM

## 2023-02-28 PROCEDURE — 17110 DESTRUCTION B9 LES UP TO 14: CPT | Performed by: PHYSICIAN ASSISTANT

## 2023-02-28 PROCEDURE — 99213 OFFICE O/P EST LOW 20 MIN: CPT | Mod: 25 | Performed by: PHYSICIAN ASSISTANT

## 2023-02-28 ASSESSMENT — PAIN SCALES - GENERAL: PAINLEVEL: NO PAIN (0)

## 2023-02-28 NOTE — PATIENT INSTRUCTIONS
Cryotherapy    What is it?  Use of a very cold liquid, such as liquid nitrogen, to freeze and destroy abnormal skin cells that need to be removed    What should I expect?  Tenderness and redness  A small blister that might grow and fill with dark purple blood. There may be crusting.  More than one treatment may be needed if the lesions do not go away.    How do I care for the treated area?  Gently wash the area with your hands when bathing.  Use a thin layer of Vaseline to help with healing. You may use a Band-Aid.   The area should heal within 7-10 days and may leave behind a pink or lighter color.   Do not use an antibiotic or Neosporin ointment.   You may take acetaminophen (Tylenol) for pain.     Call your doctor if you have:  Severe pain  Signs of infection (warmth, redness, cloudy yellow drainage, and or a bad smell)  Questions or concerns    Who should I call with questions?      [facility]: [phone]      [facility]: [phone]      For urgent needs outside of business hours call the [facility] [[email or web address]] at [phone] and ask for the dermatology resident on call       Patient Education     Checking for Skin Cancer  You can find cancer early by checking your skin each month. There are 3 kinds of skin cancer. They are melanoma, basal cell carcinoma, and squamous cell carcinoma. Doing monthly skin checks is the best way to find new marks or skin changes. Follow the instructions below for checking your skin.   The ABCDEs of checking moles for melanoma   Check your moles or growths for signs of melanoma using ABCDE:   Asymmetry: the sides of the mole or growth don t match  Border: the edges are ragged, notched, or blurred  Color: the color within the mole or growth varies  Diameter: the mole or growth is larger than 6 mm (size of a pencil eraser)  Evolving: the size, shape, or color of the mole or growth is changing (evolving is not shown in the  images below)    Checking for other types of skin cancer  Basal cell carcinoma or squamous cell carcinoma have symptoms such as:     A spot or mole that looks different from all other marks on your skin  Changes in how an area feels, such as itching, tenderness, or pain  Changes in the skin's surface, such as oozing, bleeding, or scaliness  A sore that does not heal  New swelling or redness beyond the border of a mole    Who s at risk?  Anyone can get skin cancer. But you are at greater risk if you have:   Fair skin, light-colored hair, or light-colored eyes  Many moles or abnormal moles on your skin  A history of sunburns from sunlight or tanning beds  A family history of skin cancer  A history of exposure to radiation or chemicals  A weakened immune system  If you have had skin cancer in the past, you are at risk for recurring skin cancer.   How to check your skin  Do your monthly skin checkups in front of a full-length mirror. Check all parts of your body, including your:   Head (ears, face, neck, and scalp)  Torso (front, back, and sides)  Arms (tops, undersides, upper, and lower armpits)  Hands (palms, backs, and fingers, including under the nails)  Buttocks and genitals  Legs (front, back, and sides)  Feet (tops, soles, toes, including under the nails, and between toes)  If you have a lot of moles, take digital photos of them each month. Make sure to take photos both up close and from a distance. These can help you see if any moles change over time.   Most skin changes are not cancer. But if you see any changes in your skin, call your doctor right away. Only he or she can diagnose a problem. If you have skin cancer, seeing your doctor can be the first step toward getting the treatment that could save your life.   Masher last reviewed this educational content on 4/1/2019 2000-2020 [employer]. [address] [place] [address], [place] [address]. All rights reserved. This information is not intended as  a substitute for professional medical care. Always follow your healthcare professional's instructions.       When should I call my doctor?  If you are worsening or not improving, please, contact us or seek urgent care as noted below.     Who should I call with questions (adults)?  [facility] (adult and pediatric): [phone]  [facility] (adult): [phone]  For urgent needs outside of business hours call the [facility] at [phone] and ask for the dermatology resident on call to be paged  If this is a medical emergency and you are unable to reach an ER, Call [phone]    Who should I call with questions (pediatric)?  [facility]- Pediatric Dermatology  Dr. Radha Garsia, Dr. Cesar Aldridge, Dr. Raiza Harry, RUBEN Craft, Dr. Myriam Fortune, Dr. Bere Shields & Dr. William Plunkett  Non-urgent nurse triage line; [phone]- Mary Lou and Lanette EMERY Care Coordinators   [place] (/Complex ) [phone]    If you need a prescription refill, please contact your pharmacy. Refills are approved or denied by our Physicians during normal business hours, Monday through Fridays  Per office policy, refills will not be granted if you have not been seen within the past year (or sooner depending on your child's condition)    Scheduling Information:  Pediatric Appointment Scheduling and Call Center [phone]  Radiology Scheduling- [phone]  Sedation Unit Scheduling- [phone]  [place] Scheduling- General [phone]; Pediatric Dermatology [phone]  Main  Services: [phone]  Estonian: [phone]  [place]er: [phone]  Hmong/Botswanan/Faroese: [phone]  Preadmission Nursing Department Fax Number: [phone] (Fax all pre-operative paperwork to this number)    For urgent matters arising during evenings, weekends, or holidays that cannot wait for normal business hours please  call [phone] and ask for the dermatology resident on call to be paged.

## 2023-02-28 NOTE — PROGRESS NOTES
[facility] Dermatology Note  Encounter Date: Feb 28, 2023  Office Visit     Dermatology Problem List:  1. History of NMSC  - BCC, abdomen  2. Verruca vulgaris, L subungual thumb   - s/p cryotherapy 10/1/2021, 2/28/2023  3. Inflamed seborrheic keratosis, right frontal scalp. S/p cryo 2-/28/23  ____________________________________________    Assessment & Plan:    # ISK, R frontal scalp  # Verruca vulgaris, L subungual thumb  - Cryotherapy today; see procedure note below    # Chondrodermatitis nodularis helicis, R ear  Discussed the natural history and benign nature of this lesion. Reassurance provided that no additional treatment is necessary.     # Cherry angiomas  # Inflamed verrucous keratosis, scalp  # Seborrheic keratoses  Discussed the natural history and benign nature of this lesion. Reassurance provided that no additional treatment is necessary.     # Solar lentigines  # Multiple benign nevi  - No concerning lesions today  - Monitor for ABCDEs of melanoma   - Continue sun protection - recommend SPF 30 or higher with frequent application   - Return sooner if noticing changing or symptomatic lesions    Procedures Performed:   - Cryotherapy procedure note, location(s): see above. After verbal consent and discussion of risks and benefits including, but not limited to, dyspigmentation/scar, blister, and pain, 2 lesion(s) was(were) treated with 1-2 mm freeze border for 1-2 cycles with liquid nitrogen. Post cryotherapy instructions were provided.    Follow-up: 4 week(s) in-person, or earlier for new or changing lesions    Staff and Scribe:     Scribe Disclosure:  I, Willian Reese, am serving as a scribe to document services personally performed by Lory Chau PA-C based on data collection and the provider's statements to me.     Provider Disclosure:   The documentation recorded by the scribe accurately reflects the services I personally performed and the decisions made by me.    All  risks, benefits and alternatives were discussed with patient.  Patient is in agreement and understands the assessment and plan.  All questions were answered.  Sun Screen Education was given.   Return to Clinic in 4 weeks or sooner as needed.   Lory Chau PA-C   [facility]   ____________________________________________    CC: Skin Check (Concerned about spot on scalp that is itchy/Wart under left thumb)    HPI:  Ms. Cindy Mota is a(n) 63 year old female who presents today as a return patient for a FBSE. Last seen in dermatology by Ute Woodruff PA-C on 10/11/2021, at which time patient received cryotherapy for treatment of verruca vulgaris on the L thumb and a clavus on the L foot.    Today, patient endorses a wart underneath her L thumbnail as well as a dry itchy lesion on her scalp.     Patient is otherwise feeling well, without additional skin concerns.    Labs Reviewed:  N/A    Physical Exam:  Vitals: LMP  (LMP Unknown)   SKIN: Total skin excluding the undergarment areas was performed. The exam included the head/face, neck, both arms, chest, back, abdomen, both legs, digits and/or nails.   - Skin-colored papules on the R lateral helix  - There is a tan to brown waxy stuck on papule with surrounding erythema on the R frontal scalp.   - There are dome shaped bright red papules on the trunk and extremities.   - Multiple regular brown pigmented macules and papules are identified on the trunk and extremities.   - Scattered brown macules on sun exposed areas.  - There are waxy stuck on tan to brown papules on the trunk and extremities.   - There is a verrucous papule with thrombosed capillaries interrupting dermatoglyphics on the R subungual thumb.   - Skin-colored verrucous plaque with surrounding erythema on the scalp.  - No other lesions of concern on areas examined.     Medications:  Current Outpatient Medications   Medication     famotidine (PEPCID) 20 MG tablet      gabapentin (NEURONTIN) 300 MG capsule     lisinopril (ZESTRIL) 20 MG tablet     Melatonin 10 MG TABS tablet     sertraline (ZOLOFT) 25 MG tablet     sertraline (ZOLOFT) 50 MG tablet     budesonide (ENTOCORT EC) 3 MG EC capsule     ferrous gluconate (FERGON) 324 (38 Fe) MG tablet     ferrous sulfate (FEROSUL) 325 (65 Fe) MG tablet     gabapentin (NEURONTIN) 100 MG capsule     No current facility-administered medications for this visit.      Past Medical History:   Patient Active Problem List   Diagnosis     CARDIOVASCULAR SCREENING; LDL GOAL LESS THAN 160     ACP (advance care planning)     Osteoarthritis (arthritis due to wear and tear of joints)     Family history of first degree relative with dementia     Family history of cardiovascular disease     Health Care Home     Collagenous colitis     Generalized anxiety disorder     Hx of supraventricular tachycardia     Past Medical History:   Diagnosis Date     Cancer (H)      IBS (irritable bowel syndrome)         CC Referred Self, MD  No address on file on close of this encounter.

## 2023-02-28 NOTE — LETTER
2/28/2023         RE: Cindy Mota  [address] [address] [facility] [address]        Dear Colleague,    Thank you for referring your patient, Cindy Mota, to the [facility]. Please see a copy of my visit note below.    [facility] Dermatology Note  Encounter Date: Feb 28, 2023  Office Visit     Dermatology Problem List:  1. History of NMSC  - BCC, abdomen  2. Verruca vulgaris, L subungual thumb   - s/p cryotherapy 10/1/2021, 2/28/2023  3. Inflamed seborrheic keratosis, right frontal scalp. S/p cryo 2-/28/23  ____________________________________________    Assessment & Plan:    # ISK, R frontal scalp  # Verruca vulgaris, L subungual thumb  - Cryotherapy today; see procedure note below    # Chondrodermatitis nodularis helicis, R ear  Discussed the natural history and benign nature of this lesion. Reassurance provided that no additional treatment is necessary.     # Cherry angiomas  # Inflamed verrucous keratosis, scalp  # Seborrheic keratoses  Discussed the natural history and benign nature of this lesion. Reassurance provided that no additional treatment is necessary.     # Solar lentigines  # Multiple benign nevi  - No concerning lesions today  - Monitor for ABCDEs of melanoma   - Continue sun protection - recommend SPF 30 or higher with frequent application   - Return sooner if noticing changing or symptomatic lesions    Procedures Performed:   - Cryotherapy procedure note, location(s): see above. After verbal consent and discussion of risks and benefits including, but not limited to, dyspigmentation/scar, blister, and pain, 2 lesion(s) was(were) treated with 1-2 mm freeze border for 1-2 cycles with liquid nitrogen. Post cryotherapy instructions were provided.    Follow-up: 4 week(s) in-person, or earlier for new or changing lesions    Staff and Scribe:     Scribe Disclosure:  Willian BRASWELL, am serving as a scribe to document services personally  performed by Lory Chau PA-C based on data collection and the provider's statements to me.     Provider Disclosure:   The documentation recorded by the scribe accurately reflects the services I personally performed and the decisions made by me.    All risks, benefits and alternatives were discussed with patient.  Patient is in agreement and understands the assessment and plan.  All questions were answered.  Sun Screen Education was given.   Return to Clinic in 4 weeks or sooner as needed.   Lory Chau PA-C   [facility]   ____________________________________________    CC: Skin Check (Concerned about spot on scalp that is itchy/Wart under left thumb)    HPI:  Ms. Cindy Mota is a(n) 63 year old female who presents today as a return patient for a FBSE. Last seen in dermatology by Ute Woodruff PA-C on 10/11/2021, at which time patient received cryotherapy for treatment of verruca vulgaris on the L thumb and a clavus on the L foot.    Today, patient endorses a wart underneath her L thumbnail as well as a dry itchy lesion on her scalp.     Patient is otherwise feeling well, without additional skin concerns.    Labs Reviewed:  N/A    Physical Exam:  Vitals: LMP  (LMP Unknown)   SKIN: Total skin excluding the undergarment areas was performed. The exam included the head/face, neck, both arms, chest, back, abdomen, both legs, digits and/or nails.   - Skin-colored papules on the R lateral helix  - There is a tan to brown waxy stuck on papule with surrounding erythema on the R frontal scalp.   - There are dome shaped bright red papules on the trunk and extremities.   - Multiple regular brown pigmented macules and papules are identified on the trunk and extremities.   - Scattered brown macules on sun exposed areas.  - There are waxy stuck on tan to brown papules on the trunk and extremities.   - There is a verrucous papule with thrombosed capillaries interrupting  dermatoglyphics on the R subungual thumb.   - Skin-colored verrucous plaque with surrounding erythema on the scalp.  - No other lesions of concern on areas examined.     Medications:  Current Outpatient Medications   Medication     famotidine (PEPCID) 20 MG tablet     gabapentin (NEURONTIN) 300 MG capsule     lisinopril (ZESTRIL) 20 MG tablet     Melatonin 10 MG TABS tablet     sertraline (ZOLOFT) 25 MG tablet     sertraline (ZOLOFT) 50 MG tablet     budesonide (ENTOCORT EC) 3 MG EC capsule     ferrous gluconate (FERGON) 324 (38 Fe) MG tablet     ferrous sulfate (FEROSUL) 325 (65 Fe) MG tablet     gabapentin (NEURONTIN) 100 MG capsule     No current facility-administered medications for this visit.      Past Medical History:   Patient Active Problem List   Diagnosis     CARDIOVASCULAR SCREENING; LDL GOAL LESS THAN 160     ACP (advance care planning)     Osteoarthritis (arthritis due to wear and tear of joints)     Family history of first degree relative with dementia     Family history of cardiovascular disease     Health Care Home     Collagenous colitis     Generalized anxiety disorder     Hx of supraventricular tachycardia     Past Medical History:   Diagnosis Date     Cancer (H)      IBS (irritable bowel syndrome)         CC Referred Self, MD  No address on file on close of this encounter.      Again, thank you for allowing me to participate in the care of your patient.        Sincerely,        Lory Chau PA-C

## 2023-03-13 ENCOUNTER — TRANSFERRED RECORDS (OUTPATIENT)
Dept: FAMILY MEDICINE | Facility: CLINIC | Age: 63
End: 2023-03-13

## 2023-03-31 ENCOUNTER — TRANSFERRED RECORDS (OUTPATIENT)
Dept: FAMILY MEDICINE | Facility: CLINIC | Age: 63
End: 2023-03-31

## 2023-04-19 DIAGNOSIS — F41.1 GENERALIZED ANXIETY DISORDER: ICD-10-CM

## 2023-04-20 NOTE — TELEPHONE ENCOUNTER
Cindy Mota is requesting a refill of:    Refused Prescriptions:                       Disp   Refills    sertraline (ZOLOFT) 50 MG tablet [Pharmacy*90 tab*3        Sig: TAKE 1 TABLET DAILY  Refused By: SKY BOWER  Reason for Refusal: Patient needs appointment    Last med recheck was 11/01/22 advised to return in 6 months,  due for OV

## 2023-04-26 ENCOUNTER — OFFICE VISIT (OUTPATIENT)
Dept: FAMILY MEDICINE | Facility: CLINIC | Age: 63
End: 2023-04-26

## 2023-04-26 VITALS
DIASTOLIC BLOOD PRESSURE: 68 MMHG | TEMPERATURE: 97.1 F | RESPIRATION RATE: 20 BRPM | HEIGHT: 65 IN | BODY MASS INDEX: 24.09 KG/M2 | HEART RATE: 74 BPM | SYSTOLIC BLOOD PRESSURE: 110 MMHG | WEIGHT: 144.6 LBS

## 2023-04-26 DIAGNOSIS — E61.1 IRON DEFICIENCY: ICD-10-CM

## 2023-04-26 DIAGNOSIS — F41.1 GENERALIZED ANXIETY DISORDER: ICD-10-CM

## 2023-04-26 DIAGNOSIS — I10 ESSENTIAL HYPERTENSION: Primary | ICD-10-CM

## 2023-04-26 DIAGNOSIS — N95.1 SYMPTOMATIC MENOPAUSAL OR FEMALE CLIMACTERIC STATES: ICD-10-CM

## 2023-04-26 DIAGNOSIS — K52.831 COLLAGENOUS COLITIS: ICD-10-CM

## 2023-04-26 LAB
BUN SERPL-MCNC: 14 MG/DL (ref 7–25)
BUN/CREATININE RATIO: 17.7 (ref 6–32)
CALCIUM SERPL-MCNC: 9.6 MG/DL (ref 8.6–10.3)
CHLORIDE SERPLBLD-SCNC: 108.2 MMOL/L (ref 98–110)
CO2 SERPL-SCNC: 28.5 MMOL/L (ref 20–32)
CREAT SERPL-MCNC: 0.79 MG/DL (ref 0.6–1.3)
GLUCOSE SERPL-MCNC: 90 MG/DL (ref 60–99)
POTASSIUM SERPL-SCNC: 4.67 MMOL/L (ref 3.5–5.3)
SODIUM SERPL-SCNC: 143 MMOL/L (ref 135–146)

## 2023-04-26 PROCEDURE — 80048 BASIC METABOLIC PNL TOTAL CA: CPT | Performed by: FAMILY MEDICINE

## 2023-04-26 PROCEDURE — 99214 OFFICE O/P EST MOD 30 MIN: CPT | Performed by: FAMILY MEDICINE

## 2023-04-26 PROCEDURE — 36415 COLL VENOUS BLD VENIPUNCTURE: CPT | Performed by: FAMILY MEDICINE

## 2023-04-26 RX ORDER — GABAPENTIN 300 MG/1
300 CAPSULE ORAL AT BEDTIME
Qty: 90 CAPSULE | Refills: 1 | Status: SHIPPED | OUTPATIENT
Start: 2023-04-26 | End: 2023-10-30

## 2023-04-26 RX ORDER — SERTRALINE HYDROCHLORIDE 25 MG/1
25 TABLET, FILM COATED ORAL DAILY
Qty: 90 TABLET | Refills: 1 | Status: SHIPPED | OUTPATIENT
Start: 2023-04-26 | End: 2023-10-30

## 2023-04-26 RX ORDER — LISINOPRIL 20 MG/1
20 TABLET ORAL DAILY
Qty: 90 TABLET | Refills: 3 | Status: SHIPPED | OUTPATIENT
Start: 2023-04-26 | End: 2024-03-19

## 2023-04-26 ASSESSMENT — ANXIETY QUESTIONNAIRES
GAD7 TOTAL SCORE: 0
3. WORRYING TOO MUCH ABOUT DIFFERENT THINGS: NOT AT ALL
IF YOU CHECKED OFF ANY PROBLEMS ON THIS QUESTIONNAIRE, HOW DIFFICULT HAVE THESE PROBLEMS MADE IT FOR YOU TO DO YOUR WORK, TAKE CARE OF THINGS AT HOME, OR GET ALONG WITH OTHER PEOPLE: NOT DIFFICULT AT ALL
7. FEELING AFRAID AS IF SOMETHING AWFUL MIGHT HAPPEN: NOT AT ALL
6. BECOMING EASILY ANNOYED OR IRRITABLE: NOT AT ALL
2. NOT BEING ABLE TO STOP OR CONTROL WORRYING: NOT AT ALL
5. BEING SO RESTLESS THAT IT IS HARD TO SIT STILL: NOT AT ALL
GAD7 TOTAL SCORE: 0
1. FEELING NERVOUS, ANXIOUS, OR ON EDGE: NOT AT ALL

## 2023-04-26 ASSESSMENT — PATIENT HEALTH QUESTIONNAIRE - PHQ9
5. POOR APPETITE OR OVEREATING: NOT AT ALL
SUM OF ALL RESPONSES TO PHQ QUESTIONS 1-9: 2

## 2023-04-26 NOTE — NURSING NOTE
Cindy Mota is here for a medication check and refill.    Questioned patient about current smoking habits.  Pt. has never smoked.  PULSE regular  My Chart: active  CLASSIFICATION OF OVERWEIGHT AND OBESITY BY BMI                        Obesity Class           BMI(kg/m2)  Underweight                                    < 18.5  Normal                                         18.5-24.9  Overweight                                     25.0-29.9  OBESITY                     I                  30.0-34.9                             II                 35.0-39.9  EXTREME OBESITY             III                >40                            Patient's  BMI Body mass index is 24.44 kg/m .  http://hin.nhlbi.nih.gov/menuplanner/menu.cgi  Pre-visit planning  Immunizations - up to date  Colonoscopy - is up to date  Mammogram - UTD  Asthma -   PHQ9 -    ANDREW-7 -      The patient has verbalized that it is ok to leave a detailed voice message on the patient's cell phone with results/recommendations from this visit.

## 2023-04-26 NOTE — PROGRESS NOTES
Assessment & Plan     Essential hypertension  Well controlled, continue current medications at current doses   - lisinopril (ZESTRIL) 20 MG tablet  Dispense: 90 tablet; Refill: 3  - Basic Metabolic Panel (BFP)  - VENOUS COLLECTION    Generalized anxiety disorder  Well controlled, continue current medications at current doses   - sertraline (ZOLOFT) 50 MG tablet  Dispense: 90 tablet; Refill: 1  - sertraline (ZOLOFT) 25 MG tablet  Dispense: 90 tablet; Refill: 1    Symptomatic menopausal or female climacteric states  Well controlled, working on weaning gabapentin and doing well, may try to wean sertraline as well  - gabapentin (NEURONTIN) 300 MG capsule  Dispense: 90 capsule; Refill: 1    Collagenous colitis  No current issues    Iron deficiency  Recent blood donation with normal HGB      Review of the result(s) of each unique test - labs  Ordering of each unique test  Prescription drug management         FUTURE APPOINTMENTS:       - Follow-up visit in 6 mo  Regular exercise    No follow-ups on file.    Luis Dan MD  [facility]    Rony White is a 63 year old, presenting for the following health issues:  Recheck Medication         View : No data to display.              HPI     Hypertension Follow-up      Do you check your blood pressure regularly outside of the clinic? Yes     Are you following a low salt diet? No    Are your blood pressures ever more than 140 on the top number (systolic) OR more   than 90 on the bottom number (diastolic), for example 140/90? No    Anxiety Follow-Up    How are you doing with your anxiety since your last visit? No change    Are you having other symptoms that might be associated with anxiety? No    Have you had a significant life event? No     Are you feeling depressed? No    Do you have any concerns with your use of alcohol or other drugs? No    Social History     Tobacco Use     Smoking status: Never     Smokeless tobacco: Never   Substance  "Use Topics     Alcohol use: Yes     Comment: Socially     Drug use: No         7/25/2019    12:51 PM 10/12/2021    12:55 PM 5/10/2022    11:59 AM   ANDREW-7 SCORE   Total Score 0 0 1         7/25/2019    12:51 PM 10/12/2021    12:55 PM 5/10/2022    11:59 AM   PHQ   PHQ-9 Total Score 2 1 1   Q9: Thoughts of better off dead/self-harm past 2 weeks Not at all Not at all Not at all     Mood screeners done and scanned    Menopausal symptoms/hot flashes - using gabapentin lower dose -controlling well    Colitis-no issues of late, not using budesonide any longer    How many servings of fruits and vegetables do you eat daily?  2-3    On average, how many sweetened beverages do you drink each day (Examples: soda, juice, sweet tea, etc.  Do NOT count diet or artificially sweetened beverages)?   1    How many days per week do you exercise enough to make your heart beat faster? several    How many minutes a day do you exercise enough to make your heart beat faster? 20 - 29    How many days per week do you miss taking your medication? 0          Review of Systems   Constitutional, HEENT, cardiovascular, pulmonary, gi and gu systems are negative, except as otherwise noted.      Objective    /68 (BP Location: Right arm, Patient Position: Chair, Cuff Size: Adult Regular)   Pulse 74   Temp 97.1  F (36.2  C) (Temporal)   Resp 20   Ht 1.638 m (5' 4.5\")   Wt 65.6 kg (144 lb 9.6 oz)   LMP  (LMP Unknown)   BMI 24.44 kg/m    Body mass index is 24.44 kg/m .  Physical Exam   GENERAL: healthy, alert and no distress  EYES: Eyes grossly normal to inspection, PERRL and conjunctivae and sclerae normal  HENT: ear canals and TM's normal, nose and mouth without ulcers or lesions  NECK: no adenopathy, no asymmetry, masses, or scars and thyroid normal to palpation  RESP: lungs clear to auscultation - no rales, rhonchi or wheezes  CV: regular rate and rhythm, normal S1 S2, no S3 or S4, no murmur, click or rub, no peripheral edema and " peripheral pulses strong  ABDOMEN: soft, nontender, no hepatosplenomegaly, no masses and bowel sounds normal  MS: no gross musculoskeletal defects noted, no edema  PSYCH: mentation appears normal, affect normal/bright    Office Visit on 01/13/2023   Component Date Value Ref Range Status     Color Urine 01/13/2023 Yellow   Final     Appearance Urine 01/13/2023 Cloudy (A)   Final     Glucose Urine 01/13/2023 Neg  neg mg/dL Final     Bilirubin Urine 01/13/2023 Neg  neg Final     Ketones Urine 01/13/2023 Neg  neg mg/dL Final     Specific Gravity Urine 01/13/2023 1.020  1.003 - 1.035 Final     Blood Urine 01/13/2023 Trace (A)  neg Final     pH Urine 01/13/2023 6.5  5.0 - 7.0 pH Final     Protein Urine 01/13/2023 neg  neg - neg mg/dL Final     Urobilinogen Urine 01/13/2023 0.2  0.2 - 1.0 EU/dL Final     Nitrite Urine 01/13/2023 Pos (A)  NEG Final     Leukocytes 01/13/2023 MOD (A)   Final     Wbc, Urine Micro 01/13/2023 20-40 (A)  neg - 2 Final     RBC Micro Urine 01/13/2023 4-8 (A)  neg - 2 Final     EP/HPF 01/13/2023 FEW   Final     Bacteria Urine 01/13/2023 LARGE-Valerio (A)  neg - neg Final     Casts/LPF 01/13/2023 neg   Final     Miscellaneous 01/13/2023 clumping of WBC's (A)   Final     Urine Voided Culture 01/13/2023 SEE NOTE (A)   Final    Comment:     CULTURE, URINE, ROUTINE         Micro Number:      [id number]    Test Status:       Final    Specimen Source:   Urine    Specimen Quality:  Adequate    Result:            Greater than 100,000 CFU/mL of Escherichia coli                              E.coli                            ----------------                            INT   VIKTORIYA     AMOX/CLAVULANATE       S     4     AMPICILLIN             R     >=32     AMP/SULBACTAM          I     16     CEFAZOLIN              NR    <=4 **2     CEFEPIME               S     <=1     CEFTAZIDIME            S     <=1     CEFTRIAXONE            S     <=1     CIPROFLOXACIN          R     >=4     GENTAMICIN             R     >=16      IMIPENEM               S     <=0.25     LEVOFLOXACIN           R     >=8     NITROFURANTOIN         S     <=16     PIP/TAZOBACTAM         S     <=4     TOBRAMYCIN             I     8     TRIMETHOPRIM/SULFA     S     <=20    S=Susceptible  I=Intermediate  R=Resistant  * = Not Tested  NR = Not Reported  **NN = See Therapy                            Comments      THERAPY COMMENTS        Note 1:      For infections other than uncomplicated UTI      caused by E. coli, K. pneumoniae or P. mirabilis:      Cefazolin is resistant if VIKTORIYA > or = 8 mcg/mL.      (Distinguishing susceptible versus intermediate      for isolates with VIKTORIYA < or = 4 mcg/mL requires      additional testing.)        Note 2:      For uncomplicated UTI caused by E. coli,      K. pneumoniae or P. mirabilis: Cefazolin is      susceptible if VIKTORIYA <32 mcg/mL and predicts      susceptible to the oral agents cefaclor, cefdinir,      cefpodoxime, cefprozil, cefuroxime, cephalexin      and loracarbef.       GONZALO Screen 01/13/2023 NEGATIVE  NEGATIVE Final    Comment: GONZALO IFA is a first line screen for detecting the  presence of up to approximately 150 autoantibodies in  various autoimmune diseases. A negative GONZALO IFA result  suggests an GONZALO-associated autoimmune disease is not  present at this time, but is not definitive. If there  is high clinical suspicion for Sjogren's syndrome,  testing for anti-SS-A/Ro antibody should be considered.  Anti-Yodit-1 antibody should be considered for clinically  suspected inflammatory myopathies.     AC-0: Negative     International Consensus on GONZALO Patterns  (https://doi.org/10.1515/rehp-1336-0005)     For additional information, please refer to  http://education.Defywire.iSTAR/faq/FTE968  (This link is being provided for informational/  educational purposes only.)           Sed Rate 01/13/2023 6  0 - 20 mm/hr Final     Rheumatoid Factor 01/13/2023 <14  <14 IU/mL Final

## 2023-05-15 ENCOUNTER — OFFICE VISIT (OUTPATIENT)
Dept: FAMILY MEDICINE | Facility: CLINIC | Age: 63
End: 2023-05-15
Payer: COMMERCIAL

## 2023-05-15 DIAGNOSIS — B07.9 VERRUCA VULGARIS: ICD-10-CM

## 2023-05-15 DIAGNOSIS — D18.01 CHERRY ANGIOMA: ICD-10-CM

## 2023-05-15 DIAGNOSIS — L82.0 SEBORRHEIC KERATOSIS, INFLAMED: Primary | ICD-10-CM

## 2023-05-15 PROCEDURE — 99213 OFFICE O/P EST LOW 20 MIN: CPT | Mod: 25 | Performed by: PHYSICIAN ASSISTANT

## 2023-05-15 PROCEDURE — 17110 DESTRUCTION B9 LES UP TO 14: CPT | Performed by: PHYSICIAN ASSISTANT

## 2023-05-15 ASSESSMENT — PAIN SCALES - GENERAL: PAINLEVEL: SEVERE PAIN (6)

## 2023-05-15 NOTE — LETTER
5/15/2023         RE: Cindy Mota  [address] [address] [address]        Dear Colleague,    Thank you for referring your patient, Cindy Mota, to the [facility]. Please see a copy of my visit note below.    [facility] Dermatology Note  Encounter Date: May 15, 2023  Office Visit     Dermatology Problem List:  1. History of NMSC  - BCC, abdomen  2. Verruca vulgaris, L subungual thumb   - s/p cryotherapy 10/1/2021, 2/28/2023, 5/15/2023    Last FBSE: 2/28/2023  ____________________________________________    Assessment & Plan:    # ISK, R frontal scalp  - Cryotherapy today; see procedure note below.    # Verruca vulgaris, L subungual thumb. Improving.  - Cryotherapy today; see procedure note below  - Recommended using OTC treatments to maintain continuous levels of inflammation.    # Cherry angiomas, face, upper chest  Discussed the natural history and benign nature of this lesion. Reassurance provided that no additional treatment is necessary.     Procedures Performed:   - Cryotherapy procedure note, location(s): see above. After verbal consent and discussion of risks and benefits including, but not limited to, dyspigmentation/scar, blister, and pain, 2 lesion(s) was(were) treated with 1-2 mm freeze border for 1-2 cycles with liquid nitrogen. Post cryotherapy instructions were provided.    Follow-up: 4-6 weeks as needed for repeat cryotherapy    Staff and Scribe:     Scribe Disclosure:  I, Willian Reese, am serving as a scribe to document services personally performed by Jennifer Adan PA-C based on data collection and the provider's statements to me.   Provider Disclosure:   The documentation recorded by the scribe accurately reflects the services I personally performed and the decisions made by me.    All risks, benefits and alternatives were discussed with patient.  Patient is in agreement and understands the assessment and plan.  All  questions were answered.    Jennifer Adan PA-C, [facility]  [facility] [place]: Phone: [phone], Fax: [phone]  [facility]: Phone: [phone],  Fax: [phone]  [facility]: Phone: [phone], Fax: [phone]  ____________________________________________    CC: Derm Problem (warts)    HPI:  Ms. Cindy Mota is a(n) 63 year old female who presents today as a return patient for warts. Last seen in dermatology by Lory Chau PA-C on 2/28/2023, at which time patient received cryotherapy for treatment of an ISK on the R frontal scalp and a wart on the L subungual thumb.    Today, patient endorses improvement to her wart on the L thumb. She also notes that the ISK on her scalp persisted after last visit's cryotherapy. Patient is otherwise feeling well, without additional skin concerns.    Labs Reviewed:  N/A    Physical Exam:  Vitals: LMP  (LMP Unknown)   SKIN: Focused examination of the R thumb was performed.  - There is a verrucous papule with thrombosed capillaries interrupting dermatoglyphics on the R subungual thumb. Improved from prior.   - There is a tan to brown waxy stuck on papule with surrounding erythema on the R frontal scalp.   - There are dome shaped bright red papules on the face and upper chest.   - No other lesions of concern on areas examined.     Medications:  Current Outpatient Medications   Medication     famotidine (PEPCID) 20 MG tablet     gabapentin (NEURONTIN) 300 MG capsule     lisinopril (ZESTRIL) 20 MG tablet     Melatonin 10 MG TABS tablet     sertraline (ZOLOFT) 25 MG tablet     sertraline (ZOLOFT) 50 MG tablet     ferrous sulfate (FEROSUL) 325 (65 Fe) MG tablet     No current facility-administered medications for this visit.      Past Medical History:   Patient Active Problem List   Diagnosis     CARDIOVASCULAR SCREENING; LDL GOAL LESS THAN 160     ACP (advance care  planning)     Osteoarthritis (arthritis due to wear and tear of joints)     Family history of first degree relative with dementia     Family history of cardiovascular disease     Health Care Home     Collagenous colitis     Generalized anxiety disorder     Hx of supraventricular tachycardia     Past Medical History:   Diagnosis Date     Basal cell carcinoma (BCC)     removed 6 yrs ago     Cancer (H)      IBS (irritable bowel syndrome)             Again, thank you for allowing me to participate in the care of your patient.        Sincerely,        Jennifer Adan PA-C

## 2023-05-15 NOTE — PROGRESS NOTES
[facility] Dermatology Note  Encounter Date: May 15, 2023  Office Visit     Dermatology Problem List:  1. History of NMSC  - BCC, abdomen  2. Verruca vulgaris, L subungual thumb   - s/p cryotherapy 10/1/2021, 2/28/2023, 5/15/2023    Last FBSE: 2/28/2023  ____________________________________________    Assessment & Plan:    # ISK, R frontal scalp  - Cryotherapy today; see procedure note below.    # Verruca vulgaris, L subungual thumb. Improving.  - Cryotherapy today; see procedure note below  - Recommended using OTC treatments to maintain continuous levels of inflammation.    # Cherry angiomas, face, upper chest  Discussed the natural history and benign nature of this lesion. Reassurance provided that no additional treatment is necessary.     Procedures Performed:   - Cryotherapy procedure note, location(s): see above. After verbal consent and discussion of risks and benefits including, but not limited to, dyspigmentation/scar, blister, and pain, 2 lesion(s) was(were) treated with 1-2 mm freeze border for 1-2 cycles with liquid nitrogen. Post cryotherapy instructions were provided.    Follow-up: 4-6 weeks as needed for repeat cryotherapy    Staff and Scribe:     Scribe Disclosure:  I, Willian Reese, am serving as a scribe to document services personally performed by Jennifer Adan PA-C based on data collection and the provider's statements to me.   Provider Disclosure:   The documentation recorded by the scribe accurately reflects the services I personally performed and the decisions made by me.    All risks, benefits and alternatives were discussed with patient.  Patient is in agreement and understands the assessment and plan.  All questions were answered.    Jennifer Adan PA-C, MPAS  [facility] [place]: Phone: [phone], Fax: [phone]  [facility]: Phone: [phone],  Fax: [phone]  [facility]  [place]: Phone: [phone], Fax: [phone]  ____________________________________________    CC: Derm Problem (warts)    HPI:  Ms. Cindy Mota is a(n) 63 year old female who presents today as a return patient for warts. Last seen in dermatology by Lory Chau PA-C on 2/28/2023, at which time patient received cryotherapy for treatment of an ISK on the R frontal scalp and a wart on the L subungual thumb.    Today, patient endorses improvement to her wart on the L thumb. She also notes that the ISK on her scalp persisted after last visit's cryotherapy. Patient is otherwise feeling well, without additional skin concerns.    Labs Reviewed:  N/A    Physical Exam:  Vitals: LMP  (LMP Unknown)   SKIN: Focused examination of the R thumb was performed.  - There is a verrucous papule with thrombosed capillaries interrupting dermatoglyphics on the R subungual thumb. Improved from prior.   - There is a tan to brown waxy stuck on papule with surrounding erythema on the R frontal scalp.   - There are dome shaped bright red papules on the face and upper chest.   - No other lesions of concern on areas examined.     Medications:  Current Outpatient Medications   Medication     famotidine (PEPCID) 20 MG tablet     gabapentin (NEURONTIN) 300 MG capsule     lisinopril (ZESTRIL) 20 MG tablet     Melatonin 10 MG TABS tablet     sertraline (ZOLOFT) 25 MG tablet     sertraline (ZOLOFT) 50 MG tablet     ferrous sulfate (FEROSUL) 325 (65 Fe) MG tablet     No current facility-administered medications for this visit.      Past Medical History:   Patient Active Problem List   Diagnosis     CARDIOVASCULAR SCREENING; LDL GOAL LESS THAN 160     ACP (advance care planning)     Osteoarthritis (arthritis due to wear and tear of joints)     Family history of first degree relative with dementia     Family history of cardiovascular disease     Health Care Home     Collagenous colitis     Generalized anxiety disorder     Hx of  supraventricular tachycardia     Past Medical History:   Diagnosis Date     Basal cell carcinoma (BCC)     removed 6 yrs ago     Cancer (H)      IBS (irritable bowel syndrome)

## 2023-05-15 NOTE — PATIENT INSTRUCTIONS
Cryotherapy    What is it?  Use of a very cold liquid, such as liquid nitrogen, to freeze and destroy abnormal skin cells that need to be removed    What should I expect?  Tenderness and redness  A small blister that might grow and fill with dark purple blood. There may be crusting.  More than one treatment may be needed if the lesions do not go away.    How do I care for the treated area?  Gently wash the area with your hands when bathing.  Use a thin layer of Vaseline to help with healing. You may use a Band-Aid.   The area should heal within 7-10 days and may leave behind a pink or lighter color.   Do not use an antibiotic or Neosporin ointment.   You may take acetaminophen (Tylenol) for pain.     Call your doctor if you have:  Severe pain  Signs of infection (warmth, redness, cloudy yellow drainage, and or a bad smell)  Questions or concerns    Who should I call with questions?      [facility]: [phone]      [facility]: [phone]      For urgent needs outside of business hours call the [facility] at [phone] and ask for the dermatology resident on call

## 2023-07-05 DIAGNOSIS — N95.1 MENOPAUSAL SYNDROME (HOT FLASHES): ICD-10-CM

## 2023-07-06 RX ORDER — GABAPENTIN 100 MG/1
CAPSULE ORAL
Qty: 90 CAPSULE | Refills: 3 | COMMUNITY
Start: 2023-07-06

## 2023-07-06 NOTE — TELEPHONE ENCOUNTER
Cindy Mota is requesting a refill of:    Refused Prescriptions:                       Disp   Refills    gabapentin (NEURONTIN) 100 MG capsule [Pha*90 cap*3        Sig: TAKE 1 CAPSULE DAILY  Refused By: HUBERT GALLO  Reason for Refusal: Adjustment in Therapy

## 2023-08-11 ENCOUNTER — NURSE TRIAGE (OUTPATIENT)
Dept: NURSING | Facility: CLINIC | Age: 63
End: 2023-08-11
Payer: COMMERCIAL

## 2023-08-11 ENCOUNTER — TELEPHONE (OUTPATIENT)
Dept: FAMILY MEDICINE | Facility: CLINIC | Age: 63
End: 2023-08-11

## 2023-08-11 NOTE — TELEPHONE ENCOUNTER
Mikeeri  and patient is present;     Patient reports she  has been  with a rapid heart rate  > 200; previus DX of SVT 10 years  with last episode for  30 minutes ; has tried multiple alex corbin maneuvers and heart rate  would decrease  but  then go back into rapid ate   Caller tries bearing down during triage and heart rate  drops to 92 and patient feels  better. Quickly climbed to 199  but second alex corbin  brought down to  87 where it has only crept up to  102 and remains feeling better   Triage protocol revieied   Advised to stop alex corbin maneuvers and rest   Advised to call PCP  non [facility] and discuss; may need to be een   Understands and will comply   Mini Jackson RN  FNA       Reason for Disposition   [1] Heart beating very rapidly (e.g., > 140 / minute) AND [2] not present now  (Exception: during exercise)    Additional Information   Negative: Passed out (i.e., lost consciousness, collapsed and was not responding)   Negative: Shock suspected (e.g., cold/pale/clammy skin, too weak to stand, low BP, rapid pulse)   Negative: Difficult to awaken or acting confused (e.g., disoriented, slurred speech)   Negative: Visible sweat on face or sweat dripping down face   Negative: Unable to walk, or can only walk with assistance (e.g., requires support)   Negative: [1] Received SHOCK from implantable cardiac defibrillator AND [2] persisting symptoms (i.e., palpitations, lightheadedness)   Negative: [1] Dizziness, lightheadedness, or weakness AND [2] heart beating very rapidly (e.g., > 140 / minute)   Negative: [1] Dizziness, lightheadedness, or weakness AND [2] heart beating very slowly (e.g., < 50 / minute)   Negative: Sounds like a life-threatening emergency to the triager   Negative: Chest pain   Negative: Implantable Cardiac Defibrillator (ICD) or a pacemaker symptoms or questions   Negative: Difficulty breathing   Negative: Dizziness, lightheadedness, or weakness   Negative: [1] Heart beating very rapidly  (e.g., > 140 / minute) AND [2] present now  (Exception: during exercise)   Negative: Heart beating very slowly (e.g., < 50 / minute)  (Exception: athlete and heart rate normal for caller)   Negative: New or worsened shortness of breath with activity (dyspnea on exertion)   Negative: Patient sounds very sick or weak to the triager    Protocols used: Heart Rate and Heartbeat [id number]

## 2023-08-12 NOTE — TELEPHONE ENCOUNTER
Pt called, had run of SVT, HR into 210s for up to 30 min. Detailed in [place] RN triage note. Controlled after steps advised by triage RN. Feeling well at time of call, no symptoms, /79. Advised to monitor for any new runs of SVT and to seek emergency care if this happens and cannot control with valsalva/other advice from triage RN. Follow up in clinic if needing to go to the ED. No questions.  Jez Lawson PA-C  [facility]

## 2023-08-22 ENCOUNTER — MYC MEDICAL ADVICE (OUTPATIENT)
Dept: FAMILY MEDICINE | Facility: CLINIC | Age: 63
End: 2023-08-22

## 2023-08-29 ENCOUNTER — OFFICE VISIT (OUTPATIENT)
Dept: FAMILY MEDICINE | Facility: CLINIC | Age: 63
End: 2023-08-29

## 2023-08-29 VITALS
SYSTOLIC BLOOD PRESSURE: 102 MMHG | HEART RATE: 93 BPM | RESPIRATION RATE: 20 BRPM | OXYGEN SATURATION: 96 % | BODY MASS INDEX: 24.17 KG/M2 | TEMPERATURE: 98 F | DIASTOLIC BLOOD PRESSURE: 70 MMHG | WEIGHT: 143 LBS

## 2023-08-29 DIAGNOSIS — M16.12 PRIMARY OSTEOARTHRITIS OF LEFT HIP: ICD-10-CM

## 2023-08-29 DIAGNOSIS — M25.559 GREATER TROCHANTERIC PAIN SYNDROME: Primary | ICD-10-CM

## 2023-08-29 DIAGNOSIS — M70.62 TROCHANTERIC BURSITIS OF LEFT HIP: ICD-10-CM

## 2023-08-29 PROCEDURE — 20610 DRAIN/INJ JOINT/BURSA W/O US: CPT | Mod: LT | Performed by: STUDENT IN AN ORGANIZED HEALTH CARE EDUCATION/TRAINING PROGRAM

## 2023-08-29 PROCEDURE — 99214 OFFICE O/P EST MOD 30 MIN: CPT | Mod: 25 | Performed by: STUDENT IN AN ORGANIZED HEALTH CARE EDUCATION/TRAINING PROGRAM

## 2023-08-29 RX ORDER — TRIAMCINOLONE ACETONIDE 40 MG/ML
40 INJECTION, SUSPENSION INTRA-ARTICULAR; INTRAMUSCULAR ONCE
Status: COMPLETED | OUTPATIENT
Start: 2023-08-29 | End: 2023-08-29

## 2023-08-29 RX ADMIN — TRIAMCINOLONE ACETONIDE 40 MG: 40 INJECTION, SUSPENSION INTRA-ARTICULAR; INTRAMUSCULAR at 14:36

## 2023-08-29 NOTE — PROGRESS NOTES
ASSESSMENT & PLAN      ICD-10-CM    1. Greater trochanteric pain syndrome  M25.559 triamcinolone (KENALOG-40) injection 40 mg     DRAIN/INJECT LARGE JOINT/BURSA      2. Primary osteoarthritis of left hip  M16.12       3. Trochanteric bursitis of left hip  M70.62 triamcinolone (KENALOG-40) injection 40 mg     DRAIN/INJECT LARGE JOINT/BURSA         Consult for chronic atraumatic Left hip pain prev managed at [facility], lateral pain most bothersome.   XRs  reviewed with patient. Does have some OA but clinically doesn't seem to be symptomatic  Hx and exam most c/w glut med tendinopathy/troch syndrome. We reviewed nature of this condition and improtance of proper therapyrehab, risks of repeat steroid injections. Pt elected to repeat injection today given upcoming trip..       Patient Instructions   Continue home exercises     Please let me know how you're feeling after your trip      >30 minutes spent on the date of the encounter doing chart review, history and exam, documentation and further activities per the note excl procedure.    Naveed Zelaya MD, [facility]      -----    SUBJECTIVE  Cindy Mota is a/an 63 year old female who is seen for evaluation of     Chief Complaint   Patient presents with    Consult For     Has bursitis on left hip, has been seeing [facility] for this and has had cortisone injections done, wants to come here for this now, does have a trip coming up and worried about pain with extra walking     The patient is seen by themselves.  Date of Onset: several months  Mechanism of injury: Reports insidious onset without acute precipitating event.  Location of Pain: anterior nad lateral left hip  Worsened by: pressure, activity  Treatments tried: steroid, PT, hep  Associated symptoms: no distal numbness or tingling; denies swelling or warmth    [facility] notes reviewed    Patient's PMH, PSH, and family hx reviewed.      OBJECTIVE:  /70 (BP Location: Right arm, Patient Position:  Sitting, Cuff Size: Adult Large)   Pulse 93   Temp 98  F (36.7  C) (Temporal)   Resp 20   Wt 64.9 kg (143 lb)   LMP  (LMP Unknown)   SpO2 96%   BMI 24.17 kg/m     Alert, NAD  NC/AT  Sclerae anicteric  Regular  Resp nonlabored  Skin warm and dry  No focal neuro deficits. Speech intact.   Appropriate affect    L hip symm IR wihtout pain  TTP at [facility] only  5-/5 hip abd str with pain  CMS intact distally    RADIOLOGY:  Prior L hip xrays at [facility] reviewed in impax    LEFT TROCHANTERIC BURSAL INJECTION:  The patient was informed of the risk and benefits and signed an informed consent form. The patient agreed to proceed. The area of the L  trochanteric bursa was prepped in a sterile fashion using chloral prep. 40 mg of Kenalog and 4 cc of 1% lidocaine was injected into the area. Patient tolerated procedure well and there were no complications. Following the procedure the patient had relief of her symptoms. Postinjection instructions were given.

## 2023-08-29 NOTE — NURSING NOTE
Chief Complaint   Patient presents with    Consult For     Has bursitis on left hip, has been seeing [facility] for this and has had cortisone injections done, wants to come here for this now, does have a trip coming up and wants the cortisone injection to help with this because it tends to flare up after doing lots of walking      Pre-visit Screening:  Immunizations:  up to date  Colonoscopy:  is up to date  Mammogram: is up to date  Asthma Action Test/Plan:  na  PHQ9:  na  GAD7:  na  Questioned patient about current smoking habits Pt. has never smoked.  Ok to leave detailed message on voice mail for today's visit only yes, phone # [phone] (home)

## 2023-09-30 ENCOUNTER — HEALTH MAINTENANCE LETTER (OUTPATIENT)
Age: 63
End: 2023-09-30

## 2023-10-23 DIAGNOSIS — F41.1 GENERALIZED ANXIETY DISORDER: ICD-10-CM

## 2023-10-23 RX ORDER — SERTRALINE HYDROCHLORIDE 25 MG/1
25 TABLET, FILM COATED ORAL DAILY
COMMUNITY
Start: 2023-10-23

## 2023-10-23 NOTE — TELEPHONE ENCOUNTER
Cindy Mota is requesting a refill of:    Refused Prescriptions:                       Disp   Refills    sertraline (ZOLOFT) 25 MG tablet [Pharmacy*                Sig: TAKE 1 TABLET DAILY  Refused By: HUBERT GALLO  Reason for Refusal: Patient needs appointment    Pt has OV on 10/30/23

## 2023-10-30 ENCOUNTER — OFFICE VISIT (OUTPATIENT)
Dept: FAMILY MEDICINE | Facility: CLINIC | Age: 63
End: 2023-10-30

## 2023-10-30 VITALS
BODY MASS INDEX: 24.56 KG/M2 | WEIGHT: 147.4 LBS | HEIGHT: 65 IN | HEART RATE: 68 BPM | SYSTOLIC BLOOD PRESSURE: 128 MMHG | TEMPERATURE: 97.4 F | DIASTOLIC BLOOD PRESSURE: 80 MMHG | RESPIRATION RATE: 20 BRPM

## 2023-10-30 DIAGNOSIS — F41.1 GENERALIZED ANXIETY DISORDER: ICD-10-CM

## 2023-10-30 DIAGNOSIS — N95.1 SYMPTOMATIC MENOPAUSAL OR FEMALE CLIMACTERIC STATES: ICD-10-CM

## 2023-10-30 DIAGNOSIS — M16.12 PRIMARY OSTEOARTHRITIS OF LEFT HIP: ICD-10-CM

## 2023-10-30 DIAGNOSIS — I10 ESSENTIAL HYPERTENSION: Primary | ICD-10-CM

## 2023-10-30 DIAGNOSIS — N95.1 MENOPAUSAL SYNDROME (HOT FLASHES): ICD-10-CM

## 2023-10-30 LAB
BUN SERPL-MCNC: 10 MG/DL (ref 7–25)
BUN/CREATININE RATIO: 14.9 (ref 6–32)
CALCIUM SERPL-MCNC: 9.6 MG/DL (ref 8.6–10.3)
CHLORIDE SERPLBLD-SCNC: 105.6 MMOL/L (ref 98–110)
CO2 SERPL-SCNC: 26 MMOL/L (ref 20–32)
CREAT SERPL-MCNC: 0.67 MG/DL (ref 0.6–1.3)
GLUCOSE SERPL-MCNC: 101 MG/DL (ref 60–99)
POTASSIUM SERPL-SCNC: 4.53 MMOL/L (ref 3.5–5.3)
SODIUM SERPL-SCNC: 138.7 MMOL/L (ref 135–146)

## 2023-10-30 PROCEDURE — 80048 BASIC METABOLIC PNL TOTAL CA: CPT | Performed by: FAMILY MEDICINE

## 2023-10-30 PROCEDURE — 99214 OFFICE O/P EST MOD 30 MIN: CPT | Performed by: FAMILY MEDICINE

## 2023-10-30 PROCEDURE — 36415 COLL VENOUS BLD VENIPUNCTURE: CPT | Performed by: FAMILY MEDICINE

## 2023-10-30 RX ORDER — SERTRALINE HYDROCHLORIDE 25 MG/1
25 TABLET, FILM COATED ORAL DAILY
Qty: 90 TABLET | Refills: 1 | Status: SHIPPED | OUTPATIENT
Start: 2023-10-30 | End: 2023-11-07 | Stop reason: DRUGHIGH

## 2023-10-30 RX ORDER — GABAPENTIN 300 MG/1
300 CAPSULE ORAL AT BEDTIME
Qty: 90 CAPSULE | Refills: 1 | Status: SHIPPED | OUTPATIENT
Start: 2023-10-30 | End: 2023-12-12

## 2023-10-30 NOTE — NURSING NOTE
Cindy Mota is here for a medication check and questions about her hip. PT did not seem to help with it.    Questioned patient about current smoking habits.  Pt. has never smoked.  PULSE regular  My Chart: active  CLASSIFICATION OF OVERWEIGHT AND OBESITY BY BMI                        Obesity Class           BMI(kg/m2)  Underweight                                    < 18.5  Normal                                         18.5-24.9  Overweight                                     25.0-29.9  OBESITY                     I                  30.0-34.9                             II                 35.0-39.9  EXTREME OBESITY             III                >40                            Patient's  BMI Body mass index is 24.91 kg/m .  http://hin.nhlbi.nih.gov/menuplanner/menu.cgi  Pre-visit planning  Immunizations - up to date  Colonoscopy - is up to date  Mammogram - is up to date  Asthma -   PHQ9 -    ANDREW-7 -      The patient has verbalized that it is ok to leave a detailed voice message on the patient's cell phone with results/recommendations from this visit.

## 2023-10-30 NOTE — PROGRESS NOTES
Assessment & Plan     Essential hypertension  Well controlled, continue current medications at current doses   - Basic Metabolic Panel (BFP)  - VENOUS COLLECTION    Generalized anxiety disorder  Well controlled, continue current medications at current doses   - sertraline (ZOLOFT) 25 MG tablet  Dispense: 90 tablet; Refill: 1  - sertraline (ZOLOFT) 50 MG tablet  Dispense: 90 tablet; Refill: 1    Menopausal syndrome (hot flashes)  Patient is having persistent symptoms despite previous therapies. Pt interested in new medications/options-suggest she see MTM here . Also consider finctional medicine provider    Symptomatic menopausal or female climacteric states  As above  - gabapentin (NEURONTIN) 300 MG capsule  Dispense: 90 capsule; Refill: 1    Primary osteoarthritis of left hip  Recommend she limit nsaids somewhat, f/unit(s) Dr Zelaya      Review of external notes as documented elsewhere in note  Review of the result(s) of each unique test - labs  Ordering of each unique test  Prescription drug management         FUTURE APPOINTMENTS:       - Follow-up visit in 6 mo  Regular exercise    No follow-ups on file.    Luis Dan MD  [facility]    Rony White is a 63 year old, presenting for the following health issues:  Recheck Medication    HPI       Hypertension Follow-up    Do you check your blood pressure regularly outside of the clinic? Yes   Are you following a low salt diet? No  Are your blood pressures ever more than 140 on the top number (systolic) OR more   than 90 on the bottom number (diastolic), for example 140/90? No    Anxiety Follow-Up  How are you doing with your anxiety since your last visit? No change  Are you having other symptoms that might be associated with anxiety? No  Have you had a significant life event? No   Are you feeling depressed? No  Do you have any concerns with your use of alcohol or other drugs? No    Social History     Tobacco Use    Smoking  status: Never     Passive exposure: Never    Smokeless tobacco: Never   Substance Use Topics    Alcohol use: Yes     Comment: Socially    Drug use: No         10/12/2021    12:55 PM 5/10/2022    11:59 AM 4/26/2023     2:52 PM   ANDREW-7 SCORE   Total Score 0 1 0         10/12/2021    12:55 PM 5/10/2022    11:59 AM 4/26/2023     2:52 PM   PHQ   PHQ-9 Total Score 1 1 2   Q9: Thoughts of better off dead/self-harm past 2 weeks Not at all Not at all Not at all         4/26/2023     2:52 PM   Last PHQ-9   1.  Little interest or pleasure in doing things 0   2.  Feeling down, depressed, or hopeless 0   3.  Trouble falling or staying asleep, or sleeping too much 2   4.  Feeling tired or having little energy 0   5.  Poor appetite or overeating 0   6.  Feeling bad about yourself 0   7.  Trouble concentrating 0   8.  Moving slowly or restless 0   Q9: Thoughts of better off dead/self-harm past 2 weeks 0   PHQ-9 Total Score 2   Difficulty at work, home, or with people Not difficult at all         4/26/2023     2:52 PM   ANDREW-7    1. Feeling nervous, anxious, or on edge 0   2. Not being able to stop or control worrying 0   3. Worrying too much about different things 0   4. Trouble relaxing 0   5. Being so restless that it is hard to sit still 0   6. Becoming easily annoyed or irritable 0   7. Feeling afraid, as if something awful might happen 0   ANDREW-7 Total Score 0   If you checked any problems, how difficult have they made it for you to do your work, take care of things at home, or get along with other people? Not difficult at all     Pt still struggling with left hip pain-saw ortho in past, Dr Zelaya more recently- injection helped for 3 weeks only, taking nsaids regularly    Pt states hot flashes still   How many servings of fruits and vegetables do you eat daily?  2-3  On average, how many sweetened beverages do you drink each day (Examples: soda, juice, sweet tea, etc.  Do NOT count diet or artificially sweetened beverages)?    "1  How many days per week do you exercise enough to make your heart beat faster? 3 or less  How many minutes a day do you exercise enough to make your heart beat faster? 20 - 29  How many days per week do you miss taking your medication? 0        Review of Systems   Constitutional, HEENT, cardiovascular, pulmonary, gi and gu systems are negative, except as otherwise noted.      Objective    /80 (BP Location: Left arm, Patient Position: Chair, Cuff Size: Adult Regular)   Pulse 68   Temp 97.4  F (36.3  C) (Temporal)   Resp 20   Ht 1.638 m (5' 4.5\")   Wt 66.9 kg (147 lb 6.4 oz)   LMP  (LMP Unknown)   BMI 24.91 kg/m    Body mass index is 24.91 kg/m .  Physical Exam   GENERAL: healthy, alert and no distress  NECK: no adenopathy, no asymmetry, masses, or scars and thyroid normal to palpation  RESP: lungs clear to auscultation - no rales, rhonchi or wheezes  CV: regular rate and rhythm, normal S1 S2, no S3 or S4, no murmur, click or rub, no peripheral edema and peripheral pulses strong  ABDOMEN: soft, nontender, no hepatosplenomegaly, no masses and bowel sounds normal  MS: no gross musculoskeletal defects noted, no edema    Office Visit on 04/26/2023   Component Date Value Ref Range Status    Carbon Dioxide 04/26/2023 28.5  20 - 32 mmol/L Final    Creatinine 04/26/2023 0.79  0.60 - 1.30 mg/dL Final    Glucose 04/26/2023 90  60 - 99 mg/dL Final    Sodium 04/26/2023 143.0  135 - 146 mmol/L Final    Potassium 04/26/2023 4.67  3.5 - 5.3 mmol/L Final    Chloride 04/26/2023 108.2  98 - 110 mmol/L Final    Urea Nitrogen 04/26/2023 14  7 - 25 mg/dL Final    Calcium 04/26/2023 9.6  8.6 - 10.3 mg/dL Final    BUN/Creatinine Ratio 04/26/2023 17.7  6 - 32 Final                     "

## 2023-11-07 ENCOUNTER — OFFICE VISIT (OUTPATIENT)
Dept: FAMILY MEDICINE | Facility: CLINIC | Age: 63
End: 2023-11-07

## 2023-11-07 VITALS
SYSTOLIC BLOOD PRESSURE: 122 MMHG | DIASTOLIC BLOOD PRESSURE: 80 MMHG | WEIGHT: 147 LBS | OXYGEN SATURATION: 98 % | BODY MASS INDEX: 24.49 KG/M2 | HEART RATE: 82 BPM | TEMPERATURE: 97.3 F | HEIGHT: 65 IN

## 2023-11-07 DIAGNOSIS — G89.29 CHRONIC BILATERAL LOW BACK PAIN, UNSPECIFIED WHETHER SCIATICA PRESENT: Primary | ICD-10-CM

## 2023-11-07 DIAGNOSIS — M25.552 BILATERAL HIP PAIN: ICD-10-CM

## 2023-11-07 DIAGNOSIS — M25.552 GREATER TROCHANTERIC PAIN SYNDROME OF BOTH LOWER EXTREMITIES: ICD-10-CM

## 2023-11-07 DIAGNOSIS — M25.551 GREATER TROCHANTERIC PAIN SYNDROME OF BOTH LOWER EXTREMITIES: ICD-10-CM

## 2023-11-07 DIAGNOSIS — M54.50 CHRONIC BILATERAL LOW BACK PAIN, UNSPECIFIED WHETHER SCIATICA PRESENT: Primary | ICD-10-CM

## 2023-11-07 DIAGNOSIS — M53.3 SI (SACROILIAC) JOINT DYSFUNCTION: ICD-10-CM

## 2023-11-07 DIAGNOSIS — M25.551 BILATERAL HIP PAIN: ICD-10-CM

## 2023-11-07 PROCEDURE — 72100 X-RAY EXAM L-S SPINE 2/3 VWS: CPT | Performed by: STUDENT IN AN ORGANIZED HEALTH CARE EDUCATION/TRAINING PROGRAM

## 2023-11-07 PROCEDURE — 99215 OFFICE O/P EST HI 40 MIN: CPT | Performed by: STUDENT IN AN ORGANIZED HEALTH CARE EDUCATION/TRAINING PROGRAM

## 2023-11-07 RX ORDER — DICLOFENAC SODIUM 75 MG/1
75 TABLET, DELAYED RELEASE ORAL 2 TIMES DAILY PRN
Qty: 60 TABLET | Refills: 0 | Status: SHIPPED | OUTPATIENT
Start: 2023-11-07 | End: 2023-11-07

## 2023-11-07 RX ORDER — DICLOFENAC SODIUM 75 MG/1
75 TABLET, DELAYED RELEASE ORAL 2 TIMES DAILY PRN
Qty: 60 TABLET | Refills: 0 | Status: SHIPPED | OUTPATIENT
Start: 2023-11-07 | End: 2023-12-07

## 2023-11-07 NOTE — PATIENT INSTRUCTIONS
Diclofenac twice daily as needed     [facility]  [address]  [address]  [facility] [address]  [phone] - appt line    Can consider injection of left hip joint anytime

## 2023-11-07 NOTE — PROGRESS NOTES
ASSESSMENT & PLAN      ICD-10-CM    1. Chronic bilateral low back pain, unspecified whether sciatica present  M54.50 XR Lumbar Spine 2/3 Views    G89.29 Physical Therapy Referral      2. Bilateral hip pain  M25.551 XR Lumbar Spine 2/3 Views    M25.552 Physical Therapy Referral      3. SI (sacroiliac) joint dysfunction  M53.3 Physical Therapy Referral     diclofenac (VOLTAREN) 75 MG EC tablet     DISCONTINUED: diclofenac (VOLTAREN) 75 MG EC tablet      4. Greater trochanteric pain syndrome of both lower extremities  M25.551 Physical Therapy Referral    M25.552          Low back and hip pain likely multifactorial, SI joint/early L hip OA/bilateral glute med tendinopathy. Has had negative rheum screening earlier this year. Reviewed nature of each and tx options. Agreed on plan below.     Patient Instructions   Diclofenac twice daily as needed     [facility]  [address]  [address]  [facility] [address]  [phone] - appt line    Can consider injection of left hip joint anytime    >40 minutes spent on the date of the encounter doing chart review, history and exam, documentation and further activities per the note.    Naveed Zelaya MD, [facility]  Primary Care Sports Medicine   -----    SUBJECTIVE:  Cindy Mota is a 63 year old female who is seen in follow-up for   Nursing Notes:   Olga Boateng  11/7/2023  1:13 PM  Signed  Chief Complaint   Patient presents with    Hip Pain     Left hip pain for a year  Right hip more recent, perhaps due to compensating for the left hip      Back Pain     Low back pain for several years         Pre-visit Screening:  Immunizations:  up to date  Colonoscopy:  is up to date  Mammogram: is up to date  Asthma Action Test/Plan:  NA  PHQ9:  NA  GAD7:  NA  Questioned patient about current smoking habits Pt. has never smoked.  Ok to leave detailed message on voice mail for today's visit only Yes, phone # [phone]    They were last seen 8/29/2023, GT CSI at that  "time, 3 weeks of good relief but then sx returned. No new injury.     Since their last visit reports worsening pain.   No having sx on R side as well.   L side sore and waking up with pain radiating lateral leg to ankle   No focal weakness  They have tried rest, OTC analgesics, steroid bursa injections, PT.      Screening rheum labs earlier this year wnl    The patient is seen by themselves.    Patient's past medical, surgical, social, and family histories were reviewed today and no changes are noted.      OBJECTIVE:  /80 (BP Location: Left arm, Patient Position: Sitting, Cuff Size: Adult Regular)   Pulse 82   Temp 97.3  F (36.3  C) (Temporal)   Ht 1.638 m (5' 4.5\")   Wt 66.7 kg (147 lb)   LMP  (LMP Unknown)   SpO2 98%   BMI 24.84 kg/m     Alert, NAD  NC/AT  Sclerae anicteric  Regular  Resp nonlabored  Skin warm and dry  Speech intact. Normal gait.  Appropriate affect    Lumbar spine no midline ttp  Full flexion, ext limited by pain  5/5 str in BLE, SILT BLE  TTP GT bilat hips  Symmetric hip ROM, anterior L hip pain reproduced with IR, +FADIR  TTP bilat SI joints. +sacral thrust, +TRISTAN bilat, -thigh thrust    Imaging:  Results for orders placed or performed in visit on 11/07/23   XR Lumbar Spine 2/3 Views     Status: None    Narrative    Radiologist Consultation/:   Fax:  [phone]  _____________________________________________________________________________________________________________________________________________________________________________________________________________________________________________________________________________________________________________________________________________________________________________________________________________________________________________________________________________________________________  PATIENT NAME: BONG MORFIN: 1960 Age: 63 ACCESSION NUMBER: [id number]  SEX: " F ORDERING PROVIDER: Naveed Zelaya  FACILITY: [facility] PRIMARY PROVIDER:  PATIENT ID: [id number] INTERESTED PARTY:  Page 1 of 1  _________________________________________________________________________________________________________________________________________________________________________________________________________________________________________________________________________________________________________________________________________________________________________________________  EXAM: XRAY LUMBAR SPINE-2 OR 3 VIEW  LOCATION: [facility]  DATE: 11/7/2023  INDICATION: Lower back pain. bilateral hip pain.  COMPARISON: None.  IMPRESSION: Vertebral body heights are maintained. Pedicles are intact. Multilevel loss of disc height most  notably at L5-S1. Posterior element degenerative changes. No anterolisthesis or retrolisthesis.  SIGNED BY: Kurtis Deal. MD 11/7/2023 8:43 PM         Latest Reference Range & Units 01/13/23 00:00   Sed Rate 0 - 20 mm/hr 6      Latest Reference Range & Units 01/13/23 11:35   GONZALO Screen NEGATIVE  NEGATIVE      Latest Reference Range & Units 01/13/23 11:35   Rheumatoid Factor <14 IU/mL <14

## 2023-11-09 ENCOUNTER — OFFICE VISIT (OUTPATIENT)
Dept: FAMILY MEDICINE | Facility: CLINIC | Age: 63
End: 2023-11-09

## 2023-11-09 DIAGNOSIS — N95.1 MENOPAUSAL SYNDROME (HOT FLASHES): Primary | ICD-10-CM

## 2023-11-09 LAB
ALBUMIN SERPL-MCNC: 4.3 G/DL (ref 3.6–5.1)
ALBUMIN/GLOB SERPL: 1.7 {RATIO} (ref 1–2.5)
ALP SERPL-CCNC: 65 U/L (ref 33–130)
ALT 1742-6: 12 U/L (ref 0–32)
AST 1920-8: 17 U/L (ref 0–35)
BILIRUB SERPL-MCNC: 0.6 MG/DL (ref 0.2–1.2)
BUN SERPL-MCNC: 12 MG/DL (ref 7–25)
BUN/CREATININE RATIO: 15.2 (ref 6–32)
CALCIUM SERPL-MCNC: 9.5 MG/DL (ref 8.6–10.3)
CHLORIDE SERPLBLD-SCNC: 105.6 MMOL/L (ref 98–110)
CO2 SERPL-SCNC: 26.5 MMOL/L (ref 20–32)
CREAT SERPL-MCNC: 0.79 MG/DL (ref 0.6–1.3)
GLOBULIN, CALCULATED - QUEST: 2.6 (ref 1.9–3.7)
GLUCOSE SERPL-MCNC: 102 MG/DL (ref 60–99)
POTASSIUM SERPL-SCNC: 4.73 MMOL/L (ref 3.5–5.3)
PROT SERPL-MCNC: 6.9 G/DL (ref 6.1–8.1)
SODIUM SERPL-SCNC: 141 MMOL/L (ref 135–146)

## 2023-11-09 PROCEDURE — 80053 COMPREHEN METABOLIC PANEL: CPT | Performed by: PHARMACIST

## 2023-11-09 PROCEDURE — 36415 COLL VENOUS BLD VENIPUNCTURE: CPT | Performed by: PHARMACIST

## 2023-11-09 RX ORDER — FEZOLINETANT 45 MG/1
1 TABLET, FILM COATED ORAL DAILY
Qty: 30 TABLET | Refills: 5 | Status: SHIPPED | OUTPATIENT
Start: 2023-11-09 | End: 2024-04-10

## 2023-11-09 NOTE — PROGRESS NOTES
SUBJECTIVE / OBJECTIVE:                                                Cindy Mota is a 63 year old female seen for an initial visit for Medication Therapy Management.  She was referred to me by Dr. Luis Dan.     REASON FOR MTM REFERRAL: medication management services     PATIENT CHIEF COMPLAINT/CONCERN: hot flashes    PAST MEDICAL HISTORY: Reviewed in chart    MEDICAL CONDITIONS REVIEWED:    Menopausal symptoms      Current Outpatient Medications   Medication Sig Dispense Refill    Fezolinetant (VEOZAH) 45 MG TABS Take 1 tablet by mouth daily 30 tablet 5    diclofenac (VOLTAREN) 75 MG EC tablet Take 1 tablet (75 mg) by mouth 2 times daily as needed for moderate pain 60 tablet 0    ferrous sulfate (FEROSUL) 325 (65 Fe) MG tablet TAKE 1 TABLET BY MOUTH EVERY DAY WITH BREAKFAST 90 tablet 1    gabapentin (NEURONTIN) 300 MG capsule Take 1 capsule (300 mg) by mouth at bedtime 90 capsule 1    lisinopril (ZESTRIL) 20 MG tablet Take 1 tablet (20 mg) by mouth daily 90 tablet 3    Melatonin 10 MG TABS tablet Take 1 tablet (10 mg) by mouth nightly as needed for sleep      Probiotic Product (PROBIOTIC BLEND PO)       sertraline (ZOLOFT) 50 MG tablet Take 1 tablet (50 mg) by mouth daily 90 tablet 1       Current labs include:  BP Readings from Last 3 Encounters:   11/07/23 122/80   10/30/23 128/80   08/29/23 102/70       LMP  (LMP Unknown)     Most Recent Immunizations   Administered Date(s) Administered    COVID-19 12+ (2023-24) (Pfizer) 09/27/2023    COVID-19 Bivalent 12+ (Pfizer) 09/26/2022    COVID-19 MONOVALENT 12+ (Pfizer) 12/13/2021    COVID-19 Monovalent 12+ (Pfizer 2022) 05/14/2022    COVID-19 Monovalent 18+ (Moderna) 04/24/2021    Influenza (IIV3) PF 10/26/2014    Influenza Vaccine >6 months (Alfuria,Fluzone) 09/22/2023    RSV Vaccine (Arexvy) 09/27/2023    TD,PF 7+ (Tenivac) 06/18/2010    TDAP (Adacel,Boostrix) 10/29/2020    Zoster recombinant adjuvanted (SHINGRIX) 06/20/2022       ASSESSMENT /  PLAN:                                                       Menopausal Symptoms:  Assessment: Patient has been experiencing hot flashes with a frequency of 4-5 per day as well as overnight. She states that they are decreasing in severity and frequency, however they are still quite bothersome.    Patient is currently on sertraline 50mg daily as well as gabapentin 300mg at bedtime and has noticed some relief.    Patient has tried hormone therapy as which was quite beneficial, however this was discontinued with familial breast cancer diagnosis.    Patient also uses an Emberwave bracelet to help when having a hot flash.     She would like to discuss Veozah as an option to help with hot flashes. Discussed need for baseline LFT and follow up at 3 months, 6 months, and 9 months. Patient is aware and agreeable to this frequency.    Status: Menopausal symptoms uncomfortable for patient.  Drug Therapy Problems:  1) Despite current medications, patient is still struggling with hot flashes.  Plan:  1) Initiate Veozah 45mg once daily.  2) LFTs were drawn today and will repeat at months 3, 6, and 9.  3) Follow up with patient in 4 weeks to determine efficacy.        I spent 30 minutes with this patient today.  All changes were made via collaborative practice agreement with Luis Dan. A copy of the visit note was provided to the patient's primary care provider.    Claire Escobar, PharmD  Medication Therapy Management Pharmacist  [facility]  Office Phone: [phone]

## 2023-11-09 NOTE — Clinical Note
Please see notes. I will follow up with patient in a month to determine effectiveness. Patient will need LFTs redrawn at 3, 6, and 9 months.

## 2023-11-10 ENCOUNTER — TRANSFERRED RECORDS (OUTPATIENT)
Dept: FAMILY MEDICINE | Facility: CLINIC | Age: 63
End: 2023-11-10

## 2023-11-10 ENCOUNTER — TELEPHONE (OUTPATIENT)
Dept: FAMILY MEDICINE | Facility: CLINIC | Age: 63
End: 2023-11-10

## 2023-12-07 ENCOUNTER — MYC REFILL (OUTPATIENT)
Dept: FAMILY MEDICINE | Facility: CLINIC | Age: 63
End: 2023-12-07

## 2023-12-07 DIAGNOSIS — M53.3 SI (SACROILIAC) JOINT DYSFUNCTION: ICD-10-CM

## 2023-12-07 NOTE — TELEPHONE ENCOUNTER
Please review, pt sent MyChart requesting diclofenac.    Cindy Mota is requesting a refill of:    Pending Prescriptions:                       Disp   Refills    diclofenac (VOLTAREN) 75 MG EC tablet     60 tab*0            Sig: Take 1 tablet (75 mg) by mouth 2 times daily as           needed for moderate pain

## 2023-12-08 RX ORDER — DICLOFENAC SODIUM 75 MG/1
75 TABLET, DELAYED RELEASE ORAL 2 TIMES DAILY PRN
Qty: 60 TABLET | Refills: 0 | Status: SHIPPED | OUTPATIENT
Start: 2023-12-08 | End: 2024-01-16

## 2023-12-12 ENCOUNTER — TELEPHONE (OUTPATIENT)
Dept: FAMILY MEDICINE | Facility: CLINIC | Age: 63
End: 2023-12-12

## 2023-12-12 DIAGNOSIS — N95.1 MENOPAUSAL SYNDROME (HOT FLASHES): Primary | ICD-10-CM

## 2023-12-12 RX ORDER — GABAPENTIN 100 MG/1
CAPSULE ORAL
Qty: 90 CAPSULE | Refills: 0 | Status: SHIPPED | OUTPATIENT
Start: 2023-12-12 | End: 2024-03-19

## 2023-12-12 NOTE — TELEPHONE ENCOUNTER
Patient doing well with start of Veozah. Noticing very few hot flashes, and when she does notes that they are minimal. Will continue with Veozah.    Patient would also like to taper gabapentin. Sending in an order to decrease as tolerated with 100mg capsules.     Patient will be due for recheck of labs in 2 months. She is aware.    Claire Escobar, PharmD  Clinical Pharmacist  [facility] Phone: [phone]  Direct Office Phone: [phone]

## 2023-12-19 ENCOUNTER — ANCILLARY PROCEDURE (OUTPATIENT)
Dept: MAMMOGRAPHY | Facility: CLINIC | Age: 63
End: 2023-12-19
Attending: FAMILY MEDICINE
Payer: COMMERCIAL

## 2023-12-19 DIAGNOSIS — Z12.31 VISIT FOR SCREENING MAMMOGRAM: ICD-10-CM

## 2023-12-19 PROCEDURE — 77063 BREAST TOMOSYNTHESIS BI: CPT | Mod: TC | Performed by: RADIOLOGY

## 2023-12-19 PROCEDURE — 77067 SCR MAMMO BI INCL CAD: CPT | Mod: TC | Performed by: RADIOLOGY

## 2024-01-08 NOTE — PROGRESS NOTES
Assessment & Plan   Problem List Items Addressed This Visit    None  Visit Diagnoses       Urinary symptom or sign    -  Primary    Relevant Orders    URINALYSIS, ROUTINE ([facility]) (Completed)    Acute cystitis without hematuria        Relevant Medications    nitroFURantoin macrocrystal-monohydrate (MACROBID) 100 MG capsule    Other Relevant Orders    URINE CULTURE AEROBIC BACTERIAL ([facility])           1. Urinary symptom or sign    - URINALYSIS, ROUTINE ([facility])    2. Acute cystitis without hematuria  Urinary infection, treat with macrobid.  - URINE CULTURE AEROBIC BACTERIAL ([facility])  - nitroFURantoin macrocrystal-monohydrate (MACROBID) 100 MG capsule; Take 1 capsule (100 mg) by mouth 2 times daily for 7 days  Dispense: 14 capsule; Refill: 0              FUTURE APPOINTMENTS:       - Follow-up visit as needed.    No follow-ups on file.    Faye Watkins MD  [facility]    Subjective     Nursing Notes:   Maite Saavedra MA  1/9/2024  8:42 AM  Signed  Chief Complaint   Patient presents with    UTI     Pt here with symptoms related to a UTI. Frequency, urgency, and pain at the very end when she is completed urinating. Symptoms started Thursday last week.     Pre-visit Screening:  Immunizations:  up to date  Colonoscopy:  is up to date  Mammogram: is up to date  Asthma Action Test/Plan:  na  PHQ9:  na  GAD7:  na  Questioned patient about current smoking habits Pt. has never smoked.  Ok to leave detailed message on voice mail for today's visit only yes, phone # [phone]      Cindy Mota is a 63 year old female who presents to clinic today for the following health issues HPI     Here for possible uti. Sx since last week Thursday. Pain with urination, urgency and frequency. No fevers or abdominal pain.        Review of Systems   Constitutional, HEENT, cardiovascular, pulmonary, gi and gu systems are negative, except as otherwise noted.      Objective    /82 (BP Location: Left arm, Patient Position:  Sitting, Cuff Size: Adult Regular)   Pulse 73   Temp 97.7  F (36.5  C) (Temporal)   Wt 65.3 kg (144 lb)   LMP  (LMP Unknown)   SpO2 98%   BMI 24.34 kg/m    Body mass index is 24.34 kg/m .  Physical Exam   GENERAL: healthy, alert and no distress  ABDOMEN: soft, nontender, no hepatosplenomegaly, no masses and bowel sounds normal  MS: no gross musculoskeletal defects noted, no edema  NEURO: Normal strength and tone, mentation intact and speech normal  PSYCH: mentation appears normal, affect normal/bright    Results for orders placed or performed in visit on 01/09/24   URINALYSIS, ROUTINE ([facility])     Status: Abnormal   Result Value Ref Range    Color Urine Yellow     Appearance Urine Slightly Cloudy (A)     Glucose Urine Neg mg/dL    Bilirubin Urine Neg     Ketones Urine Neg mg/dL    Specific Gravity Urine 1.020     Blood Urine Neg     pH Urine 6.0 pH    Protein Urine neg neg - neg mg/dL    Urobilinogen Urine 0.2 EU/dL    Nitrite Urine Neg     Leukocytes small (A)     Wbc, Urine Micro 10-15 (A) neg - 2    RBC Micro Urine neg neg - 2    EP/HPF neg     Bacteria Urine large (A) neg - neg    Casts/LPF neg     Miscellaneous neg

## 2024-01-09 ENCOUNTER — OFFICE VISIT (OUTPATIENT)
Dept: FAMILY MEDICINE | Facility: CLINIC | Age: 64
End: 2024-01-09

## 2024-01-09 VITALS
TEMPERATURE: 97.7 F | WEIGHT: 144 LBS | OXYGEN SATURATION: 98 % | BODY MASS INDEX: 24.34 KG/M2 | HEART RATE: 73 BPM | DIASTOLIC BLOOD PRESSURE: 82 MMHG | SYSTOLIC BLOOD PRESSURE: 120 MMHG

## 2024-01-09 DIAGNOSIS — N30.00 ACUTE CYSTITIS WITHOUT HEMATURIA: ICD-10-CM

## 2024-01-09 DIAGNOSIS — R39.9 URINARY SYMPTOM OR SIGN: Primary | ICD-10-CM

## 2024-01-09 PROBLEM — K52.831 COLLAGENOUS COLITIS: Status: RESOLVED | Noted: 2020-06-02 | Resolved: 2024-01-09

## 2024-01-09 LAB
APPEARANCE UR: ABNORMAL
BACTERIA, UR: ABNORMAL
BILIRUB UR QL: ABNORMAL
CASTS/LPF: ABNORMAL
COLOR UR: YELLOW
EP/HPF: ABNORMAL
GLUCOSE URINE: ABNORMAL MG/DL
HGB UR QL: ABNORMAL
KETONES UR QL: ABNORMAL MG/DL
MISC.: ABNORMAL
NITRITE UR QL STRIP: ABNORMAL
PH UR STRIP: 6 PH (ref 5–7)
PROT UR QL: ABNORMAL MG/DL
RBC, UR MICRO: ABNORMAL (ref ?–2)
SP GR UR STRIP: 1.02
UROBILINOGEN UR QL STRIP: 0.2 EU/DL (ref 0.2–1)
WBC #/AREA URNS HPF: ABNORMAL /[HPF]
WBC, UR MICRO: ABNORMAL (ref ?–2)

## 2024-01-09 PROCEDURE — 87088 URINE BACTERIA CULTURE: CPT | Mod: 90 | Performed by: FAMILY MEDICINE

## 2024-01-09 PROCEDURE — 99214 OFFICE O/P EST MOD 30 MIN: CPT | Performed by: FAMILY MEDICINE

## 2024-01-09 PROCEDURE — 81001 URINALYSIS AUTO W/SCOPE: CPT | Performed by: FAMILY MEDICINE

## 2024-01-09 PROCEDURE — 87086 URINE CULTURE/COLONY COUNT: CPT | Mod: 90 | Performed by: FAMILY MEDICINE

## 2024-01-09 PROCEDURE — 87077 CULTURE AEROBIC IDENTIFY: CPT | Mod: 90 | Performed by: FAMILY MEDICINE

## 2024-01-09 PROCEDURE — 87186 SC STD MICRODIL/AGAR DIL: CPT | Mod: 90 | Performed by: FAMILY MEDICINE

## 2024-01-09 RX ORDER — NITROFURANTOIN 25; 75 MG/1; MG/1
100 CAPSULE ORAL 2 TIMES DAILY
Qty: 14 CAPSULE | Refills: 0 | Status: SHIPPED | OUTPATIENT
Start: 2024-01-09 | End: 2024-01-16

## 2024-01-09 NOTE — NURSING NOTE
Chief Complaint   Patient presents with    UTI     Pt here with symptoms related to a UTI. Frequency, urgency, and pain at the very end when she is completed urinating. Symptoms started Thursday last week.     Pre-visit Screening:  Immunizations:  up to date  Colonoscopy:  is up to date  Mammogram: is up to date  Asthma Action Test/Plan:  na  PHQ9:  na  GAD7:  na  Questioned patient about current smoking habits Pt. has never smoked.  Ok to leave detailed message on voice mail for today's visit only yes, phone # [phone]

## 2024-01-12 LAB — URINE VOIDED CULTURE: ABNORMAL

## 2024-01-13 DIAGNOSIS — M53.3 SI (SACROILIAC) JOINT DYSFUNCTION: ICD-10-CM

## 2024-01-16 RX ORDER — DICLOFENAC SODIUM 75 MG/1
TABLET, DELAYED RELEASE ORAL
Qty: 60 TABLET | Refills: 0 | Status: SHIPPED | OUTPATIENT
Start: 2024-01-16 | End: 2024-03-28

## 2024-01-16 NOTE — TELEPHONE ENCOUNTER
Please advise, how long should pt continue this RX? Last seen for this 11/07/23 she has been doing PT at [facility].    Cindy Mota is requesting a refill of:    Pending Prescriptions:                       Disp   Refills    diclofenac (VOLTAREN) 75 MG EC tablet [Ph*60 tab*0            Sig: TAKE 1 TABLET BY MOUTH TWICE A DAY AS NEEDED FOR           MODERATE PAIN

## 2024-03-12 ENCOUNTER — OFFICE VISIT (OUTPATIENT)
Dept: FAMILY MEDICINE | Facility: CLINIC | Age: 64
End: 2024-03-12

## 2024-03-12 ENCOUNTER — TRANSFERRED RECORDS (OUTPATIENT)
Dept: FAMILY MEDICINE | Facility: CLINIC | Age: 64
End: 2024-03-12

## 2024-03-12 VITALS
SYSTOLIC BLOOD PRESSURE: 118 MMHG | DIASTOLIC BLOOD PRESSURE: 82 MMHG | OXYGEN SATURATION: 97 % | TEMPERATURE: 98 F | BODY MASS INDEX: 24.34 KG/M2 | WEIGHT: 144 LBS | HEART RATE: 82 BPM | RESPIRATION RATE: 20 BRPM

## 2024-03-12 DIAGNOSIS — M25.552 GREATER TROCHANTERIC PAIN SYNDROME OF BOTH LOWER EXTREMITIES: Primary | ICD-10-CM

## 2024-03-12 DIAGNOSIS — G89.29 CHRONIC BILATERAL LOW BACK PAIN, UNSPECIFIED WHETHER SCIATICA PRESENT: ICD-10-CM

## 2024-03-12 DIAGNOSIS — M25.551 GREATER TROCHANTERIC PAIN SYNDROME OF BOTH LOWER EXTREMITIES: Primary | ICD-10-CM

## 2024-03-12 DIAGNOSIS — M54.50 CHRONIC BILATERAL LOW BACK PAIN, UNSPECIFIED WHETHER SCIATICA PRESENT: ICD-10-CM

## 2024-03-12 DIAGNOSIS — M53.3 SI (SACROILIAC) JOINT DYSFUNCTION: ICD-10-CM

## 2024-03-12 PROCEDURE — 99215 OFFICE O/P EST HI 40 MIN: CPT | Performed by: STUDENT IN AN ORGANIZED HEALTH CARE EDUCATION/TRAINING PROGRAM

## 2024-03-12 NOTE — PATIENT INSTRUCTIONS
What is PRP?   Platelet-rich plasm  (PRP) is a procedure in which the platelets/healing factors are concentrated using the patient's own blood.  The procedure is used to stimulate the body's own natural healing response.    What MSK Conditions Can be PRP be Used for?  Tendinosis/tendonitis  Acute and Chronic muscle strains/tears  Osteoarthritis   Ligament Sprains    How does it Work?  The day of the procedure a small amount of blood will be drawn from the patient's arm.  The blood is then placed into a centrifuge to separate the red blood cells and other blood components to produce PRP.  Using ultrasound guidance to ensure accuracy, the concentrated PRP is then injected into the injured area/tissue.  Following the injection, the PRP releases cell-growth factors that trigger the body's natural healing response.  You will experience increased inflammation and soreness for several days following the procedure.  You should treat area as a new injured and may be placed on crutches or a sling for a short-period.  PRP can usually expect relief 6 - 8 weeks following the procedure.  For chronic issues a repeat injection is sometimes needed.  The decision for repeat injections will be made between you and your doctor.    Is PRP Covered by Health Insurance?  PRP is considered an experimental procedure that is cash based at this time.  [facility] currently charges $900/body part.  Payment for the procedure is due the day of the injection.    It is possible that your HSA dollars may be used for the procedure, however patients are encouraged to check with their insurance first.      What to do Prior to your PRP Procedure?  Patients should not use any anti-inflammatory medications/NSAIDs (like aspirin, naproxen, ibuprofen, diclofenac,  ) for at least 2 weeks prior to the injection.  You may use Tylenol/acetaminophen for pain control as needed.  Patients on long-term anticoagulation medications like Warfarin/Coumadin, Eliquis, Lovenox,  or Plavix should discuss holding the medication for 1 - 2 days with anticoagulation clinic.  Please hydrate by drinking 8 glasses (64 oz) of water within 24 hours of the procedure.  Arrange for someone to drive you home after the procedure    What to Expect Day of Procedure?  Forms will be completed and payment is due when checking in for the procedure.  Your arm will be cleaned, and a small amount of blood will be drawn.  The blood will then be taken to a centrifuge to spun down to separate the PRP from the whole blood.    After the PRP is  you will be injected with the concentrated PRP under ultrasound guidance.    Please plan for this appointment to take ~ 60 minutes to complete.    After the PRP Procedure?  You may experience increased achiness and soreness for 3 - 5 days following the procedure.  Your provider may discuss providing a small amount of narcotic pain medication to help with pain.  Please schedule to have another person drive you home following your procedure.  Patients should not use any anti-inflammatory medications/NSAIDs (like aspirin, naproxen, ibuprofen, diclofenac,  ) for at least 3 weeks following the procedure.  Avoid using ice on the area for ~ 2 weeks.  You may use Tylenol/acetaminophen for pain control as needed.    Plan to rest the remainder of the day and for approximately 3 days following the procedure.    You may be in a sling or on crutches for ~ 3 - 5 days.  Plan to provide the clinic with an update either via phone or [employer] approximately 2 weeks after your procedure.  We will schedule a follow up appointment for approximately 6 weeks after the procedure to formally reassess your pain levels.    Specific physical therapy instructions will be provided if needed.  Most patients require only a single treatment, depending on the degree of injury.  However each individual case is different and repeat treatments may be indicated. The decision for repeat injections will be made  between you and your doctor.      Please call or reach out via [employer] to schedule your PRP procedure or with further questions.

## 2024-03-12 NOTE — PROGRESS NOTES
Uyo;vfiASSESSMENT & PLAN      ICD-10-CM    1. Greater trochanteric pain syndrome of both lower extremities  M25.551     M25.552       2. Chronic bilateral low back pain, unspecified whether sciatica present  M54.50     G89.29       3. SI (sacroiliac) joint dysfunction  M53.3          Ongoing lateral left hip pain as well as bilat low back/SI and radicular LLE pain  Patient aware pains are likely multi-factorial.   Minimal improvement thus far with PT, transient relief from troch bursa steroid injection last summer  Reviewed options for referral, further imaging, or glut med PRP    Patient Instructions     What is PRP?   Platelet-rich plasm  (PRP) is a procedure in which the platelets/healing factors are concentrated using the patient's own blood.  The procedure is used to stimulate the body's own natural healing response.    What MSK Conditions Can be PRP be Used for?  Tendinosis/tendonitis  Acute and Chronic muscle strains/tears  Osteoarthritis   Ligament Sprains    How does it Work?  The day of the procedure a small amount of blood will be drawn from the patient's arm.  The blood is then placed into a centrifuge to separate the red blood cells and other blood components to produce PRP.  Using ultrasound guidance to ensure accuracy, the concentrated PRP is then injected into the injured area/tissue.  Following the injection, the PRP releases cell-growth factors that trigger the body's natural healing response.  You will experience increased inflammation and soreness for several days following the procedure.  You should treat area as a new injured and may be placed on crutches or a sling for a short-period.  PRP can usually expect relief 6 - 8 weeks following the procedure.  For chronic issues a repeat injection is sometimes needed.  The decision for repeat injections will be made between you and your doctor.    Is PRP Covered by Health Insurance?  PRP is considered an experimental procedure that is cash based at  this time.  [facility] currently charges $900/body part.  Payment for the procedure is due the day of the injection.    It is possible that your HSA dollars may be used for the procedure, however patients are encouraged to check with their insurance first.      What to do Prior to your PRP Procedure?  Patients should not use any anti-inflammatory medications/NSAIDs (like aspirin, naproxen, ibuprofen, diclofenac,  ) for at least 2 weeks prior to the injection.  You may use Tylenol/acetaminophen for pain control as needed.  Patients on long-term anticoagulation medications like Warfarin/Coumadin, Eliquis, Lovenox, or Plavix should discuss holding the medication for 1 - 2 days with anticoagulation clinic.  Please hydrate by drinking 8 glasses (64 oz) of water within 24 hours of the procedure.  Arrange for someone to drive you home after the procedure    What to Expect Day of Procedure?  Forms will be completed and payment is due when checking in for the procedure.  Your arm will be cleaned, and a small amount of blood will be drawn.  The blood will then be taken to a centrifuge to spun down to separate the PRP from the whole blood.    After the PRP is  you will be injected with the concentrated PRP under ultrasound guidance.    Please plan for this appointment to take ~ 60 minutes to complete.    After the PRP Procedure?  You may experience increased achiness and soreness for 3 - 5 days following the procedure.  Your provider may discuss providing a small amount of narcotic pain medication to help with pain.  Please schedule to have another person drive you home following your procedure.  Patients should not use any anti-inflammatory medications/NSAIDs (like aspirin, naproxen, ibuprofen, diclofenac,  ) for at least 3 weeks following the procedure.  Avoid using ice on the area for ~ 2 weeks.  You may use Tylenol/acetaminophen for pain control as needed.    Plan to rest the remainder of the day and for approximately 3  days following the procedure.    You may be in a sling or on crutches for ~ 3 - 5 days.  Plan to provide the clinic with an update either via phone or [employer] approximately 2 weeks after your procedure.  We will schedule a follow up appointment for approximately 6 weeks after the procedure to formally reassess your pain levels.    Specific physical therapy instructions will be provided if needed.  Most patients require only a single treatment, depending on the degree of injury.  However each individual case is different and repeat treatments may be indicated. The decision for repeat injections will be made between you and your doctor.      Please call or reach out via [employer] to schedule your PRP procedure or with further questions.    >40 minutes spent on the date of the encounter doing chart review, history and exam, documentation and further activities per the note.    Naveed Zelaya MD, [facility]  Primary Care Sports Medicine   -----    SUBJECTIVE:  Cindy Mota is a 64 year old female who is seen in follow-up for   Nursing Notes:   Tesha Caballero, [facility]  3/12/2024  9:46 AM  Signed  Chief Complaint   Patient presents with    Follow Up     Follow up on left hip bursitis and lower back pain, has been doing physical therapy which has helped a little with both hip and lower back, wants to review this more with Dr. Zelaya today, today pain is not bad but last week had a few complex days, injection done last September in her hip only worked for about 3 weeks      Pre-visit Screening:  Immunizations:  up to date  Colonoscopy:  is up to date  Mammogram: is up to date  Asthma Action Test/Plan:  na  PHQ9:  phq2 done today   GAD7:  na  Questioned patient about current smoking habits Pt. has never smoked.  Ok to leave detailed message on voice mail for today's visit only yes, phone # [phone] (home)          They were last seen 11/7/2023.   Left troch bursa inj aug 2023 - 3 weeks of pain relief.   Ongoing PT with limited  relief. PT notes reviewed.    They have also tried rest/activity avoidance, NSAIDs.      The patient is seen by themselves.    Patient's past medical, surgical, social, and family histories were reviewed today and no changes are noted.      OBJECTIVE:  /82 (BP Location: Left arm, Patient Position: Sitting, Cuff Size: Adult Large)   Pulse 82   Temp 98  F (36.7  C) (Temporal)   Resp 20   Wt 65.3 kg (144 lb)   LMP  (LMP Unknown)   SpO2 97%   BMI 24.34 kg/m     Alert, NAD  NC/AT  Sclerae anicteric  Regular  Resp nonlabored  Skin warm and dry  No focal neuro deficits. Speech intact. Normal gait.  Appropriate affect    L hip ROM symm and painfree  Focal TTP Greater troch      Imaging:  Prior L hip xrays at [facility] 2023 reviewed in impax    EXAM: XRAY LUMBAR SPINE-2 OR 3 VIEW   LOCATION: [facility]   DATE: 11/7/2023   INDICATION: Lower back pain. bilateral hip pain.   COMPARISON: None.   IMPRESSION: Vertebral body heights are maintained. Pedicles are intact. Multilevel loss of disc height most   notably at L5-S1. Posterior element degenerative changes. No anterolisthesis or retrolisthesis.   SIGNED BY: Kurtis Chanel MD 11/7/2023 8:43 PM

## 2024-03-12 NOTE — NURSING NOTE
Chief Complaint   Patient presents with    Follow Up     Follow up on left hip bursitis and lower back pain, has been doing physical therapy which has helped a little with both hip and lower back, wants to review this more with Dr. Zelaya today, today pain is not bad but last week had a few complex days, injection done last September in her hip only worked for about 3 weeks      Pre-visit Screening:  Immunizations:  up to date  Colonoscopy:  is up to date  Mammogram: is up to date  Asthma Action Test/Plan:  na  PHQ9:  phq2 done today   GAD7:  na  Questioned patient about current smoking habits Pt. has never smoked.  Ok to leave detailed message on voice mail for today's visit only yes, phone # [phone] (home)

## 2024-03-13 NOTE — PROGRESS NOTES
Preventive Care Visit  [facility]  Faye Watkins MD, Family Medicine  Mar 19, 2024       SUBJECTIVE:   Cindy is a 64 year old, presenting for the following:  Physical (Non-fasting today)      HPI      Here for a physical.  Not fasting today.  She wants a pap smear today.  Wants a refill on her lisinopri for blood pressure. Is doing well on the medication.  Also wants a refill on sertraline, is trying to cut back on this, taking it for hot flashes but when she tried to stop it, hot flashes worsened.  Also wants a refill on gabapentin 100 mg tabs, is currently taking 300 mg tabs that she has at home then will start the weaning down on it.  Also taking veozah for hot flashes. Doesn't need a refill.                  Social History     Tobacco Use    Smoking status: Never     Passive exposure: Never    Smokeless tobacco: Never   Substance Use Topics    Alcohol use: Yes     Comment: Socially              No data to display            No concerns  Reviewed orders with patient.  Reviewed health maintenance and updated orders accordingly - Yes  BP Readings from Last 3 Encounters:   03/19/24 114/74   03/12/24 118/82   01/09/24 120/82    Wt Readings from Last 3 Encounters:   03/19/24 65.3 kg (144 lb)   03/12/24 65.3 kg (144 lb)   01/09/24 65.3 kg (144 lb)                  Patient Active Problem List   Diagnosis    ACP (advance care planning)    Osteoarthritis (arthritis due to wear and tear of joints)    Family history of first degree relative with dementia    Family history of cardiovascular disease    Health Care Home    Generalized anxiety disorder    Hx of supraventricular tachycardia    Family history of malignant neoplasm of breast     Past Surgical History:   Procedure Laterality Date    HC LAPAROSCOPY, SURGICAL; CHOLECYSTECTOMY  08/01/2010    Cholecystectomy, Laparoscopic    HC TOOTH EXTRACTION W/FORCEP  age 33    wisdom    HYSTERECTOMY, PAP NO LONGER INDICATED  05/01/2007    Ovaries are  in       Social History     Tobacco Use    Smoking status: Never     Passive exposure: Never    Smokeless tobacco: Never   Substance Use Topics    Alcohol use: Yes     Comment: Socially     Family History   Problem Relation Age of Onset    Neurologic Disorder Mother 54        Pick's Disease    Prostate Cancer Father     Arthritis Father     C.A.D. Father 63        stent    Hypertension Father     Lipids Father     Coronary Artery Disease Father     Lipids Sister     Hypertension Sister          Current Outpatient Medications   Medication Sig Dispense Refill    diclofenac (VOLTAREN) 75 MG EC tablet TAKE 1 TABLET BY MOUTH TWICE A DAY AS NEEDED FOR MODERATE PAIN 60 tablet 0    gabapentin (NEURONTIN) 100 MG capsule Take 3 capsules by mouth at bedtime. May taper down as tolerated. 90 capsule 0    lisinopril (ZESTRIL) 20 MG tablet Take 1 tablet (20 mg) by mouth daily 90 tablet 1    Melatonin 10 MG TABS tablet Take 1 tablet (10 mg) by mouth nightly as needed for sleep      sertraline (ZOLOFT) 25 MG tablet Take 1 tablet (25 mg) by mouth daily 90 tablet 1    Fezolinetant (VEOZAH) 45 MG TABS Take 1 tablet by mouth daily 30 tablet 5       Breast Cancer Screening:  Any new diagnosis of family breast, ovarian, or bowel cancer? Yes daughter with breast cancer and now recently a cousin just dx with breast cancer. Would like a referral for genetic counselor.      FHS-7:       11/3/2021     8:54 AM 12/15/2022     8:55 AM 12/19/2023     7:59 AM   Breast CA Risk Assessment (FHS-7)   Did any of your first-degree relatives have breast or ovarian cancer? Yes Yes Yes   Did any of your relatives have bilateral breast cancer? Yes No No   Did any man in your family have breast cancer? No No No   Did any woman in your family have breast and ovarian cancer? No Yes No   Did any woman in your family have breast cancer before age 50 y? Yes Yes Yes   Do you have 2 or more relatives with breast and/or ovarian cancer? No No No   Do you have 2 or  "more relatives with breast and/or bowel cancer? No No No     Mammogram up to date.    Pertinent mammograms are reviewed under the imaging tab.    History of abnormal Pap smear: hysterectomy, per patient still has her cervix and wants pap, although previous pap was 2 years ago. History normal pap     Reviewed and updated as needed this visit by clinical staff   Tobacco  Allergies   Problems             Reviewed and updated as needed this visit by Provider                  Past Medical History:   Diagnosis Date    Basal cell carcinoma (BCC)     removed 6 yrs ago    Cancer (H)     IBS (irritable bowel syndrome)       Past Surgical History:   Procedure Laterality Date    HC LAPAROSCOPY, SURGICAL; CHOLECYSTECTOMY  08/01/2010    Cholecystectomy, Laparoscopic    HC TOOTH EXTRACTION W/FORCEP  age 33    wisdom    HYSTERECTOMY, PAP NO LONGER INDICATED  05/01/2007    Ovaries are in     Review of Systems    Review of Systems  Constitutional, HEENT, cardiovascular, pulmonary, gi and gu systems are negative, except as otherwise noted.    OBJECTIVE:   /74 (BP Location: Right arm, Patient Position: Sitting, Cuff Size: Adult Regular)   Pulse 81   Temp 98.3  F (36.8  C) (Temporal)   Ht 1.626 m (5' 4\")   Wt 65.3 kg (144 lb)   LMP  (LMP Unknown)   SpO2 98%   BMI 24.72 kg/m     Estimated body mass index is 24.72 kg/m  as calculated from the following:    Height as of this encounter: 1.626 m (5' 4\").    Weight as of this encounter: 65.3 kg (144 lb).  Physical Exam  GENERAL: alert and no distress  EYES: Eyes grossly normal to inspection, PERRL and conjunctivae and sclerae normal  HENT: ear canals and TM's normal, nose and mouth without ulcers or lesions  NECK: no adenopathy, no asymmetry, masses, or scars  RESP: lungs clear to auscultation - no rales, rhonchi or wheezes  BREAST: normal without masses, tenderness or nipple discharge and no palpable axillary masses or adenopathy  CV: regular rate and rhythm, normal S1 S2, no " S3 or S4, no murmur, click or rub, no peripheral edema  ABDOMEN: soft, nontender, no hepatosplenomegaly, no masses and bowel sounds normal   (female) w/bimanual: normal female external genitalia, normal urethral meatus, normal vaginal mucosa, and normal cervix/adnexa/uterus without masses or discharge  MS: no gross musculoskeletal defects noted, no edema  SKIN: no suspicious lesions or rashes  NEURO: Normal strength and tone, mentation intact and speech normal  PSYCH: mentation appears normal, affect normal/bright    Diagnostic Test Results:  Labs reviewed in Epic  No results found for any visits on 03/19/24.    ASSESSMENT/PLAN:   1. Encounter for routine history and physical exam in female patient      2. Essential hypertension  Check labs, come back for fasting lipids.  - lisinopril (ZESTRIL) 20 MG tablet; Take 1 tablet (20 mg) by mouth daily  Dispense: 90 tablet; Refill: 1  - VENOUS COLLECTION  - Basic Metabolic Panel (BFP)    3. Generalized anxiety disorder      4. Menopausal syndrome (hot flashes)  Refilled sertraline and gabapentin. Does not need refill on veozah.  - sertraline (ZOLOFT) 25 MG tablet; Take 1 tablet (25 mg) by mouth daily  Dispense: 90 tablet; Refill: 1  - gabapentin (NEURONTIN) 100 MG capsule; Take 3 capsules by mouth at bedtime. May taper down as tolerated.  Dispense: 90 capsule; Refill: 0    5. Screening for cervical cancer    - THINPREP TIS PAP AND HPV mRNA E6/E7 ([facility])  - CT PELVIC EXAMINATION    6. Family history of malignant neoplasm of breast  Referral done to genetic counselor.  - Adult Oncology/Hematology  Referral     Patient has been advised of split billing requirements and indicates understanding: Yes      Counseling  Reviewed preventive health counseling, as reflected in patient instructions       Regular exercise       Healthy diet/nutrition        She reports that she has never smoked. She has never been exposed to tobacco smoke. She has never used smokeless  tobacco.          Signed Electronically by: Faye Watkins MD

## 2024-03-19 ENCOUNTER — OFFICE VISIT (OUTPATIENT)
Dept: FAMILY MEDICINE | Facility: CLINIC | Age: 64
End: 2024-03-19

## 2024-03-19 VITALS
SYSTOLIC BLOOD PRESSURE: 114 MMHG | DIASTOLIC BLOOD PRESSURE: 74 MMHG | TEMPERATURE: 98.3 F | HEART RATE: 81 BPM | HEIGHT: 64 IN | BODY MASS INDEX: 24.59 KG/M2 | OXYGEN SATURATION: 98 % | WEIGHT: 144 LBS

## 2024-03-19 DIAGNOSIS — N95.1 MENOPAUSAL SYNDROME (HOT FLASHES): ICD-10-CM

## 2024-03-19 DIAGNOSIS — Z00.00 ENCOUNTER FOR ROUTINE HISTORY AND PHYSICAL EXAM IN FEMALE PATIENT: Primary | ICD-10-CM

## 2024-03-19 DIAGNOSIS — I10 ESSENTIAL HYPERTENSION: ICD-10-CM

## 2024-03-19 DIAGNOSIS — Z12.4 SCREENING FOR CERVICAL CANCER: ICD-10-CM

## 2024-03-19 DIAGNOSIS — Z80.3 FAMILY HISTORY OF MALIGNANT NEOPLASM OF BREAST: ICD-10-CM

## 2024-03-19 DIAGNOSIS — F41.1 GENERALIZED ANXIETY DISORDER: ICD-10-CM

## 2024-03-19 LAB
BUN SERPL-MCNC: 13 MG/DL (ref 7–25)
BUN/CREATININE RATIO: 16.3 (ref 6–32)
CALCIUM SERPL-MCNC: 9.6 MG/DL (ref 8.6–10.3)
CHLORIDE SERPLBLD-SCNC: 107.1 MMOL/L (ref 98–110)
CO2 SERPL-SCNC: 28.8 MMOL/L (ref 20–32)
CREAT SERPL-MCNC: 0.8 MG/DL (ref 0.6–1.3)
GLUCOSE SERPL-MCNC: 97 MG/DL (ref 60–99)
POTASSIUM SERPL-SCNC: 4.91 MMOL/L (ref 3.5–5.3)
SODIUM SERPL-SCNC: 141.8 MMOL/L (ref 135–146)

## 2024-03-19 PROCEDURE — 88175 CYTOPATH C/V AUTO FLUID REDO: CPT | Mod: 90 | Performed by: FAMILY MEDICINE

## 2024-03-19 PROCEDURE — 99459 PELVIC EXAMINATION: CPT | Performed by: FAMILY MEDICINE

## 2024-03-19 PROCEDURE — 87624 HPV HI-RISK TYP POOLED RSLT: CPT | Mod: 90 | Performed by: FAMILY MEDICINE

## 2024-03-19 PROCEDURE — 80048 BASIC METABOLIC PNL TOTAL CA: CPT | Performed by: FAMILY MEDICINE

## 2024-03-19 PROCEDURE — 36415 COLL VENOUS BLD VENIPUNCTURE: CPT | Performed by: FAMILY MEDICINE

## 2024-03-19 PROCEDURE — 99396 PREV VISIT EST AGE 40-64: CPT | Performed by: FAMILY MEDICINE

## 2024-03-19 PROCEDURE — 99214 OFFICE O/P EST MOD 30 MIN: CPT | Mod: 25 | Performed by: FAMILY MEDICINE

## 2024-03-19 RX ORDER — LISINOPRIL 20 MG/1
20 TABLET ORAL DAILY
Qty: 90 TABLET | Refills: 1 | Status: SHIPPED | OUTPATIENT
Start: 2024-03-19 | End: 2024-10-01

## 2024-03-19 RX ORDER — SERTRALINE HYDROCHLORIDE 25 MG/1
25 TABLET, FILM COATED ORAL DAILY
Qty: 90 TABLET | Refills: 1 | Status: SHIPPED | OUTPATIENT
Start: 2024-03-19 | End: 2024-10-01

## 2024-03-19 RX ORDER — GABAPENTIN 100 MG/1
CAPSULE ORAL
Qty: 90 CAPSULE | Refills: 0 | Status: SHIPPED | OUTPATIENT
Start: 2024-03-19 | End: 2024-06-07

## 2024-03-19 NOTE — NURSING NOTE
Chief Complaint   Patient presents with    Physical     Non-fasting today, wants to repeat pap    Recheck Medication     Refill medications     Pre-visit Screening:  Immunizations:  up to date  Colonoscopy:  is up to date  Mammogram: is up to date  Asthma Action Test/Plan:  MA  PHQ9:  NA  GAD7:  NA  Questioned patient about current smoking habits Pt. has never smoked.  Ok to leave detailed message on voice mail for today's visit only Yes, phone # [phone]

## 2024-03-20 LAB
CLINICAL HISTORY - QUEST: NORMAL
COMMENT - QUEST: NORMAL
COMMENT - QUEST: NORMAL
CYTOTECHNOLOGIST - QUEST: NORMAL
DESCRIPTIVE DIAGNOSIS - QUEST: NORMAL
HPV MRNA E6/E7: NOT DETECTED
LAST PAP DX - QUEST: NORMAL
LMP - QUEST: NORMAL
PREV BX DX - QUEST: NORMAL
SOURCE: NORMAL
STATEMENT OF ADEQUACY - QUEST: NORMAL

## 2024-03-22 ENCOUNTER — OFFICE VISIT (OUTPATIENT)
Dept: FAMILY MEDICINE | Facility: CLINIC | Age: 64
End: 2024-03-22

## 2024-03-22 VITALS
HEART RATE: 64 BPM | WEIGHT: 144 LBS | SYSTOLIC BLOOD PRESSURE: 134 MMHG | BODY MASS INDEX: 24.72 KG/M2 | TEMPERATURE: 98 F | DIASTOLIC BLOOD PRESSURE: 78 MMHG | OXYGEN SATURATION: 99 % | RESPIRATION RATE: 20 BRPM

## 2024-03-22 DIAGNOSIS — M25.551 GREATER TROCHANTERIC PAIN SYNDROME OF BOTH LOWER EXTREMITIES: ICD-10-CM

## 2024-03-22 DIAGNOSIS — Z00.00 ENCOUNTER FOR ROUTINE HISTORY AND PHYSICAL EXAM IN FEMALE PATIENT: ICD-10-CM

## 2024-03-22 DIAGNOSIS — M25.552 GREATER TROCHANTERIC PAIN SYNDROME OF BOTH LOWER EXTREMITIES: ICD-10-CM

## 2024-03-22 DIAGNOSIS — M67.952 TENDINOPATHY OF LEFT GLUTEUS MEDIUS: Primary | ICD-10-CM

## 2024-03-22 LAB
CHOLEST SERPL-MCNC: 238 MG/DL (ref 0–199)
CHOLEST/HDLC SERPL: 3 {RATIO} (ref 0–5)
HDLC SERPL-MCNC: 86 MG/DL (ref 40–150)
LDLC SERPL CALC-MCNC: 134 MG/DL (ref 0–130)
TRIGL SERPL-MCNC: 91 MG/DL (ref 0–149)

## 2024-03-22 PROCEDURE — 80061 LIPID PANEL: CPT | Performed by: FAMILY MEDICINE

## 2024-03-22 PROCEDURE — 0232T NJX PLATELET PLASMA: CPT | Performed by: STUDENT IN AN ORGANIZED HEALTH CARE EDUCATION/TRAINING PROGRAM

## 2024-03-22 PROCEDURE — 36415 COLL VENOUS BLD VENIPUNCTURE: CPT | Performed by: FAMILY MEDICINE

## 2024-03-22 RX ORDER — OXYCODONE HYDROCHLORIDE 5 MG/1
2.5-5 TABLET ORAL EVERY 6 HOURS PRN
Qty: 10 TABLET | Refills: 0 | Status: SHIPPED | OUTPATIENT
Start: 2024-03-22 | End: 2024-03-25

## 2024-03-22 NOTE — PATIENT INSTRUCTIONS
Avoid anti-inflammatory medications/NSAIDs (like aspirin, naproxen, ibuprofen, diclofenac,  ) for at least 3 weeks  Avoid using ice on the area for ~ 2 weeks.    You may use Tylenol/acetaminophen for pain control as needed.    Plan to rest for 2-3 days  Follow-up with PT in 10-14 days to restart therapy  Update me on symptoms in 4-6 weeks, clinic visit if needed

## 2024-03-22 NOTE — NURSING NOTE
Chief Complaint   Patient presents with    Consult For     Wants to talk about doing PRP injection today into the left hip      Pre-visit Screening:  Immunizations:  up to date  Colonoscopy:  is up to date  Mammogram: is up to date  Asthma Action Test/Plan:  na  PHQ9:  na  GAD7:  na  Questioned patient about current smoking habits Pt. has never smoked.  Ok to leave detailed message on voice mail for today's visit only yes, phone # [phone] (home)

## 2024-03-22 NOTE — NURSING NOTE
Info on kit:   : [employer]   REF: [id number]        LOT: [id number]  EXP:  11/30/2025     The patient was laid down on exam room table where 100 ml of blood was drawn mixed with 8 ml of heparin. The patient tolerated this well. The 100 ml was at 7% hematocrit.  Dr. Zelaya is the performing physician who will do the injection for the patient on his left hip.     Patient did sign a consent form before procedure was started today. All of her questions were answered.

## 2024-03-22 NOTE — PROGRESS NOTES
Nursing Notes:   Tesha Caballero CMA  3/22/2024 12:30 PM  Signed  Chief Complaint   Patient presents with    Consult For     Wants to talk about doing PRP injection today into the left hip      Pre-visit Screening:  Immunizations:  up to date  Colonoscopy:  is up to date  Mammogram: is up to date  Asthma Action Test/Plan:  na  PHQ9:  na  GAD7:  na  Questioned patient about current smoking habits Pt. has never smoked.  Ok to leave detailed message on voice mail for today's visit only yes, phone # [phone] (home)         Tesha Caballero CMA  3/22/2024  2:47 PM  Addendum  Info on kit:   : [facility]   REF: [id number]        LOT: [id number]  EXP:  11/30/2025     The patient was laid down on exam room table where 100 ml of blood was drawn mixed with 8 ml of heparin. The patient tolerated this well. The 100 ml was at 7% hematocrit.  Dr. Zelaya is the performing physician who will do the injection for the patient on his left hip.     Patient did sign a consent form before procedure was started today. All of her questions were answered.       Platelet Rich Plasma Injection - Left Gluteus medius tendon  The patient was informed of the risks and the benefits of the procedure and a written consent was signed.  The patient s L lateral hip was prepped with chlorhexidine in sterile fashion.   Total 100 cc of blood was drawn from the patient's antecubital fossa by our in-house , then processed on location in the [facility] Ch[facility] System per protocol and approximately 6 cc of LR Platelet Rich Plasma was obtained  Injection was performed using sterile technique. 10cc 1% plain lidocaine was used for local anesthesia. Then under ultrasound guidance a 3.5-inch 22-gauge needle was used to fenestrate the L gluteus medius tendon.  Multiple fenestrations, approximately 20, were made through the tendon at the bony attachment.  PRP was injected without difficulty.  Needle placement and injection were visualized and  documented with ultrasound.  Ultrasound visualization was necessary to visualize fenestration and confirm injectate placement.  Images were permanently stored for the patient's record.  There were no complications. The patient tolerated the procedure well and reported signficant improvement in her symptoms from the lidocaine. There was negligible bleeding.   The patient was instructed to avoid NSAIDS for the next week and refrain from overuse over the next 3 days.   The patient was instructed to call or go to the emergency room with any unusual pain, swelling, redness, or if otherwise concerned.      ICD-10-CM    1. Tendinopathy of left gluteus medius  M67.952 oxyCODONE (ROXICODONE) 5 MG tablet      2. Greater trochanteric pain syndrome of both lower extremities  M25.551     M25.552          Patient Instructions   Avoid anti-inflammatory medications/NSAIDs (like aspirin, naproxen, ibuprofen, diclofenac,  ) for at least 3 weeks  Avoid using ice on the area for ~ 2 weeks.    You may use Tylenol/acetaminophen for pain control as needed.    Plan to rest for 2-3 days  Follow-up with PT in 10-14 days to restart therapy  Update me on symptoms in 4-6 weeks, clinic visit if needed      Naveed Zealya MD, [facility]

## 2024-03-26 ENCOUNTER — OFFICE VISIT (OUTPATIENT)
Dept: FAMILY MEDICINE | Facility: CLINIC | Age: 64
End: 2024-03-26
Payer: COMMERCIAL

## 2024-03-26 DIAGNOSIS — L81.4 SOLAR LENTIGINOSIS: ICD-10-CM

## 2024-03-26 DIAGNOSIS — D18.01 CHERRY ANGIOMA: ICD-10-CM

## 2024-03-26 DIAGNOSIS — Z85.828 HISTORY OF BASAL CELL CARCINOMA: ICD-10-CM

## 2024-03-26 DIAGNOSIS — L82.1 SEBORRHEIC KERATOSES: ICD-10-CM

## 2024-03-26 DIAGNOSIS — L84 CORN OR CALLUS: Primary | ICD-10-CM

## 2024-03-26 DIAGNOSIS — D22.9 MULTIPLE PIGMENTED NEVI: ICD-10-CM

## 2024-03-26 DIAGNOSIS — Z12.83 SKIN CANCER SCREENING: ICD-10-CM

## 2024-03-26 DIAGNOSIS — L82.0 INFLAMED SEBORRHEIC KERATOSIS: ICD-10-CM

## 2024-03-26 PROCEDURE — 17110 DESTRUCTION B9 LES UP TO 14: CPT | Performed by: PHYSICIAN ASSISTANT

## 2024-03-26 PROCEDURE — 99213 OFFICE O/P EST LOW 20 MIN: CPT | Mod: 25 | Performed by: PHYSICIAN ASSISTANT

## 2024-03-26 ASSESSMENT — PAIN SCALES - GENERAL: PAINLEVEL: NO PAIN (0)

## 2024-03-26 NOTE — PATIENT INSTRUCTIONS
Patient Education       Proper skin care from [facility]:    -Eliminate harsh soaps as they strip the natural oils from the skin, often resulting in dry itchy skin ( i.e. Dial, Zest, [place] Spring)  -Use mild soaps such as Cetaphil or Dove Sensitive Skin in the shower. You do not need to use soap on arms, legs, and trunk every time you shower unless visibly soiled.   -Avoid hot or cold showers.  -After showering, lightly dry off and apply moisturizing within 2-3 minutes. This will help trap moisture in the skin.   -Aggressive use of a moisturizer at least 1-2 times a day to the entire body (including -Vanicream, Cetaphil, Aquaphor or Cerave) and moisturize hands after every washing.  -We recommend using moisturizers that come in a tub that needs to be scooped out, not a pump. This has more of an oil base. It will hold moisture in your skin much better than a water base moisturizer. The above recommended are non-pore clogging.      Wear a sunscreen with at least SPF 30 on your face, ears, neck and V of the chest daily. Wear sunscreen on other areas of the body if those areas are exposed to the sun throughout the day. Sunscreens can contain physical and/or chemical blockers. Physical blockers are less likely to clog pores, these include zinc oxide and titanium dioxide. Reapply every two hour and after swimming.     Sunscreen examples: https://www.ewg.org/sunscreen/    UV radiation  UVA radiation remains constant throughout the day and throughout the year. It is a longer wavelength than UVB and therefore penetrates deeper into the skin leading to immediate and delayed tanning, photoaging, and skin cancer. 70-80% of UVA and UVB radiation occurs between the hours of 10am-2pm.  UVB radiation  UVB radiation causes the most harmful effects and is more significant during the summer months. However, snow and ice can reflect UVB radiation leading to skin damage during the winter months as well. UVB radiation is  responsible for tanning, burning, inflammation, delayed erythema (pinkness), pigmentation (brown spots), and skin cancer.     I recommend self monthly full body exams and yearly full body exams with a dermatology provider. If you develop a new or changing lesion please follow up for examination. Most skin cancers are pink and scaly or pink and pearly. However, we do see blue/brown/black skin cancers.  Consider the ABCDEs of melanoma when giving yourself your monthly full body exam ( don't forget the groin, buttocks, feet, toes, etc). A-asymmetry, B-borders, C-color, D-diameter, E-elevation or evolving. If you see any of these changes please follow up in clinic. If you cannot see your back I recommend purchasing a hand held mirror to use with a larger wall mirror.       Checking for Skin Cancer  You can find cancer early by checking your skin each month. There are 3 kinds of skin cancer. They are melanoma, basal cell carcinoma, and squamous cell carcinoma. Doing monthly skin checks is the best way to find new marks or skin changes. Follow the instructions below for checking your skin.   The ABCDEs of checking moles for melanoma   Check your moles or growths for signs of melanoma using ABCDE:   Asymmetry: the sides of the mole or growth don t match  Border: the edges are ragged, notched, or blurred  Color: the color within the mole or growth varies  Diameter: the mole or growth is larger than 6 mm (size of a pencil eraser)  Evolving: the size, shape, or color of the mole or growth is changing (evolving is not shown in the images below)    Checking for other types of skin cancer  Basal cell carcinoma or squamous cell carcinoma have symptoms such as:     A spot or mole that looks different from all other marks on your skin  Changes in how an area feels, such as itching, tenderness, or pain  Changes in the skin's surface, such as oozing, bleeding, or scaliness  A sore that does not heal  New swelling or redness beyond  the border of a mole    Who s at risk?  Anyone can get skin cancer. But you are at greater risk if you have:   Fair skin, light-colored hair, or light-colored eyes  Many moles or abnormal moles on your skin  A history of sunburns from sunlight or tanning beds  A family history of skin cancer  A history of exposure to radiation or chemicals  A weakened immune system  If you have had skin cancer in the past, you are at risk for recurring skin cancer.   How to check your skin  Do your monthly skin checkups in front of a full-length mirror. Check all parts of your body, including your:   Head (ears, face, neck, and scalp)  Torso (front, back, and sides)  Arms (tops, undersides, upper, and lower armpits)  Hands (palms, backs, and fingers, including under the nails)  Buttocks and genitals  Legs (front, back, and sides)  Feet (tops, soles, toes, including under the nails, and between toes)  If you have a lot of moles, take digital photos of them each month. Make sure to take photos both up close and from a distance. These can help you see if any moles change over time.   Most skin changes are not cancer. But if you see any changes in your skin, call your doctor right away. Only he or she can diagnose a problem. If you have skin cancer, seeing your doctor can be the first step toward getting the treatment that could save your life.   [employer] last reviewed this educational content on 4/1/2019 2000-2020 [employer]. [address] [place] [address], [place] [address]. All rights reserved. This information is not intended as a substitute for professional medical care. Always follow your healthcare professional's instructions.       When should I call my doctor?  If you are worsening or not improving, please, contact us or seek urgent care as noted below.     Who should I call with questions (adults)?  [facility] (adult and pediatric): [phone]  [facility]  [place] (adult): [phone]  [facility] ([place], [place], [place] and [place]) [phone]  For urgent needs outside of business hours call the [facility] at [phone] and ask for the dermatology resident on call to be paged  If this is a medical emergency and you are unable to reach an ER, Call 911      If you need a prescription refill, please contact your pharmacy. Refills are approved or denied by our Physicians during normal business hours, Monday through Fridays  Per office policy, refills will not be granted if you have not been seen within the past year (or sooner depending on your child's condition)

## 2024-03-26 NOTE — PROGRESS NOTES
[facility] Dermatology Note  Encounter Date: Mar 26, 2024  Office Visit     Dermatology Problem List:  1. History of NMSC  - BCC, abdomen  2. Verruca vulgaris, L subungual thumb   - s/p cryotherapy 10/1/2021, 2/28/2023, 5/15/2023  3. Corn,  - L plantar surface, s/p pair    Last FBSE: 3/25/2024    ____________________________________________    Assessment & Plan:    # Corn, L plantar surface  # Discussed treating with salicylic acid, scrubbing with a stone  - Pared for comfort    # ISK, R frontal scalp x 3  - Cryotherapy performed, see procedure note below.    # Skin cancer screening with multiple benign nevi, solar lentigines  - ABCDEs: Counseled ABCDEs of melanoma: Asymmetry, Border (irregularity), Color (not uniform, changes in color), Diameter (greater than 6 mm which is about the size of a pencil eraser), and Evolving (any changes in preexisting moles).  - Sun protection: Counseled SPF30+ sunscreen, UPF clothing, sun avoidance, tanning bed avoidance.    # Cherry angiomas and seborrheic keratoses,  Benign, reassurance given.     # Hx NMSC.  -No signs of recurrence  - Continue regular skin examinations  - Sun precaution was advised including the use of sun screens of SPF 30 or higher, sun protective clothing, and avoidance of tanning beds.    Procedures Performed:   - Cryotherapy procedure note, location(s): R frontal scalp x 3. After verbal consent and discussion of risks and benefits including, but not limited to, dyspigmentation/scar, blister, and pain, 1 lesion(s) was(were) treated with 1-2 mm freeze border for 1-2 cycles with liquid nitrogen. Post cryotherapy instructions were provided.    Follow-up: 1 year(s) in-person, or earlier for new or changing lesions    Staff and Scribe:     Scribe Disclosure:   CAROL BRASWELL, am serving as a scribe; to document services personally performed by Lory Chau PA-C-based on data collection and the provider's statements to  me.      Provider Disclosure:   The documentation recorded by the scribe accurately reflects the services I personally performed and the decisions made by me.    All risks, benefits and alternatives were discussed with patient.  Patient is in agreement and understands the assessment and plan.  All questions were answered.  Sun Screen Education was given.   Return to Clinic annually or sooner as needed.   Lory Chau PA-C   [facility]    ____________________________________________    CC: Skin Check (FBSC and warts removal on left foot.)    HPI:  Ms. Cindy Mota is a(n) 64 year old female who presents today as a return patient for a FBSE. Last seen in dermatology by CORNELIO Adan on 5/15/23 at which time a verrucous papule on the L subungual thumb underwent cryotherapy.     Today, she mentions a plantar wart on her foot that has been present and bothersome for almost one year.    Patient is otherwise feeling well, without additional skin concerns.    Labs Reviewed:  N/A    Physical Exam:  Vitals: LMP  (LMP Unknown)   SKIN: Full skin, which includes the head/face, both arms, chest, back, abdomen,both legs, genitalia and/or groin buttocks, digits and/or nails, was examined.  - Hyperkeratotic papule on the L plantar surface.  - There are dome shaped bright red papules on the trunk.   - Multiple regular brown pigmented macules and papules are identified on the trunk and extremities.   - Scattered brown macules on sun exposed areas.  - There are waxy stuck on tan to brown papules on the trunk.   - There are tan to brown waxy stuck on papules with surrounding erythema on the R frontal scalp x 3.   - No other lesions of concern on areas examined.     Medications:  Current Outpatient Medications   Medication    Fezolinetant (VEOZAH) 45 MG TABS    gabapentin (NEURONTIN) 100 MG capsule    lisinopril (ZESTRIL) 20 MG tablet    Melatonin 10 MG TABS tablet    sertraline (ZOLOFT) 25 MG  tablet    diclofenac (VOLTAREN) 75 MG EC tablet     No current facility-administered medications for this visit.      Past Medical History:   Patient Active Problem List   Diagnosis    ACP (advance care planning)    Osteoarthritis (arthritis due to wear and tear of joints)    Family history of first degree relative with dementia    Family history of cardiovascular disease    Health Care Home    Generalized anxiety disorder    Hx of supraventricular tachycardia    Family history of malignant neoplasm of breast     Past Medical History:   Diagnosis Date    Basal cell carcinoma (BCC)     removed 6 yrs ago    Cancer (H)     IBS (irritable bowel syndrome)         CC Referred Self, MD  No address on file on close of this encounter.

## 2024-03-26 NOTE — LETTER
3/26/2024         RE: Cindy Mota  [address] [address] [address]        Dear Colleague,    Thank you for referring your patient, Cindy Mota, to the [facility]. Please see a copy of my visit note below.    [facility] Dermatology Note  Encounter Date: Mar 26, 2024  Office Visit     Dermatology Problem List:  1. History of NMSC  - BCC, abdomen  2. Verruca vulgaris, L subungual thumb   - s/p cryotherapy 10/1/2021, 2/28/2023, 5/15/2023  3. Corn,  - L plantar surface, s/p pair    Last FBSE: 3/25/2024    ____________________________________________    Assessment & Plan:    # Corn, L plantar surface  # Discussed treating with salicylic acid, scrubbing with a stone  - Pared for comfort    # ISK, R frontal scalp x 3  - Cryotherapy performed, see procedure note below.    # Skin cancer screening with multiple benign nevi, solar lentigines  - ABCDEs: Counseled ABCDEs of melanoma: Asymmetry, Border (irregularity), Color (not uniform, changes in color), Diameter (greater than 6 mm which is about the size of a pencil eraser), and Evolving (any changes in preexisting moles).  - Sun protection: Counseled SPF30+ sunscreen, UPF clothing, sun avoidance, tanning bed avoidance.    # Cherry angiomas and seborrheic keratoses,  Benign, reassurance given.     # Hx NMSC.  -No signs of recurrence  - Continue regular skin examinations  - Sun precaution was advised including the use of sun screens of SPF 30 or higher, sun protective clothing, and avoidance of tanning beds.    Procedures Performed:   - Cryotherapy procedure note, location(s): R frontal scalp x 3. After verbal consent and discussion of risks and benefits including, but not limited to, dyspigmentation/scar, blister, and pain, 1 lesion(s) was(were) treated with 1-2 mm freeze border for 1-2 cycles with liquid nitrogen. Post cryotherapy instructions were provided.    Follow-up: 1 year(s) in-person, or  earlier for new or changing lesions    Staff and Scribe:     Scribe Disclosure:   I, CAROL MCDONALD, am serving as a scribe; to document services personally performed by Lory Chau PA-C-based on data collection and the provider's statements to me.      Provider Disclosure:   The documentation recorded by the scribe accurately reflects the services I personally performed and the decisions made by me.    All risks, benefits and alternatives were discussed with patient.  Patient is in agreement and understands the assessment and plan.  All questions were answered.  Sun Screen Education was given.   Return to Clinic annually or sooner as needed.   Lory Chau PA-C   [facility]    ____________________________________________    CC: Skin Check (FBSC and warts removal on left foot.)    HPI:  Ms. Cindy Mota is a(n) 64 year old female who presents today as a return patient for a FBSE. Last seen in dermatology by CORNELIO Adan on 5/15/23 at which time a verrucous papule on the L subungual thumb underwent cryotherapy.     Today, she mentions a plantar wart on her foot that has been present and bothersome for almost one year.    Patient is otherwise feeling well, without additional skin concerns.    Labs Reviewed:  N/A    Physical Exam:  Vitals: LMP  (LMP Unknown)   SKIN: Full skin, which includes the head/face, both arms, chest, back, abdomen,both legs, genitalia and/or groin buttocks, digits and/or nails, was examined.  - Hyperkeratotic papule on the L plantar surface.  - There are dome shaped bright red papules on the trunk.   - Multiple regular brown pigmented macules and papules are identified on the trunk and extremities.   - Scattered brown macules on sun exposed areas.  - There are waxy stuck on tan to brown papules on the trunk.   - There are tan to brown waxy stuck on papules with surrounding erythema on the R frontal scalp x 3.   - No other lesions of concern on  areas examined.     Medications:  Current Outpatient Medications   Medication     Fezolinetant (VEOZAH) 45 MG TABS     gabapentin (NEURONTIN) 100 MG capsule     lisinopril (ZESTRIL) 20 MG tablet     Melatonin 10 MG TABS tablet     sertraline (ZOLOFT) 25 MG tablet     diclofenac (VOLTAREN) 75 MG EC tablet     No current facility-administered medications for this visit.      Past Medical History:   Patient Active Problem List   Diagnosis     ACP (advance care planning)     Osteoarthritis (arthritis due to wear and tear of joints)     Family history of first degree relative with dementia     Family history of cardiovascular disease     Health Care Home     Generalized anxiety disorder     Hx of supraventricular tachycardia     Family history of malignant neoplasm of breast     Past Medical History:   Diagnosis Date     Basal cell carcinoma (BCC)     removed 6 yrs ago     Cancer (H)      IBS (irritable bowel syndrome)         CC Referred Self, MD  No address on file on close of this encounter.      Again, thank you for allowing me to participate in the care of your patient.        Sincerely,        Lory Chau PA-C

## 2024-03-28 DIAGNOSIS — N95.1 MENOPAUSAL SYNDROME (HOT FLASHES): ICD-10-CM

## 2024-03-28 NOTE — TELEPHONE ENCOUNTER
Cindy Mota is requesting a refill of:    Pending Prescriptions:                       Disp   Refills    Fezolinetant (VEOZAH) 45 MG TABS          90 tab*1            Sig: Take 1 tablet by mouth daily    Please close encounter if RX was sent. Thanks, Yue

## 2024-04-02 RX ORDER — FEZOLINETANT 45 MG/1
1 TABLET, FILM COATED ORAL DAILY
COMMUNITY
Start: 2024-04-02

## 2024-04-02 NOTE — TELEPHONE ENCOUNTER
Not due for refills for another month. I saw her last month and she said she doesn't need refills.

## 2024-04-02 NOTE — TELEPHONE ENCOUNTER
Cindy Mota is requesting a refill of:    Refused Prescriptions:                       Disp   Refills    Fezolinetant (VEOZAH) 45 MG TABS                           Sig: Take 1 tablet by mouth daily  Refused By: SKY BOWER  Reason for Refusal: Patient needs appointment

## 2024-04-09 ENCOUNTER — TELEPHONE (OUTPATIENT)
Dept: FAMILY MEDICINE | Facility: CLINIC | Age: 64
End: 2024-04-09

## 2024-04-09 DIAGNOSIS — N95.1 MENOPAUSAL SYNDROME (HOT FLASHES): Primary | ICD-10-CM

## 2024-04-09 NOTE — TELEPHONE ENCOUNTER
Patient needs LFTs drawn at 3, 6 and 9 months for Veozah.    Will put in order for labs.    Claire Escobar, PharmD  Clinical Pharmacist  [facility]  General Clinic Phone: [phone]  Direct Office Phone: [phone]

## 2024-04-10 DIAGNOSIS — N95.1 MENOPAUSAL SYNDROME (HOT FLASHES): ICD-10-CM

## 2024-04-10 LAB
ALBUMIN SERPL-MCNC: 4.1 G/DL (ref 3.6–5.1)
ALBUMIN/GLOB SERPL: 1.5 {RATIO} (ref 1–2.5)
ALP SERPL-CCNC: 71 U/L (ref 33–130)
ALT 1742-6: 16 U/L (ref 0–32)
AST 1920-8: 24 U/L (ref 0–35)
BILIRUB SERPL-MCNC: 0.5 MG/DL (ref 0.2–1.2)
BUN SERPL-MCNC: 11 MG/DL (ref 7–25)
BUN/CREATININE RATIO: 13.3 (ref 6–32)
CALCIUM SERPL-MCNC: 9.8 MG/DL (ref 8.6–10.3)
CHLORIDE SERPLBLD-SCNC: 106.5 MMOL/L (ref 98–110)
CO2 SERPL-SCNC: 29.9 MMOL/L (ref 20–32)
CREAT SERPL-MCNC: 0.83 MG/DL (ref 0.6–1.3)
GLOBULIN, CALCULATED - QUEST: 2.7 (ref 1.9–3.7)
GLUCOSE SERPL-MCNC: 100 MG/DL (ref 60–99)
POTASSIUM SERPL-SCNC: 5.02 MMOL/L (ref 3.5–5.3)
PROT SERPL-MCNC: 6.8 G/DL (ref 6.1–8.1)
SODIUM SERPL-SCNC: 140.6 MMOL/L (ref 135–146)

## 2024-04-10 PROCEDURE — 36415 COLL VENOUS BLD VENIPUNCTURE: CPT | Performed by: FAMILY MEDICINE

## 2024-04-10 PROCEDURE — 80053 COMPREHEN METABOLIC PANEL: CPT | Performed by: FAMILY MEDICINE

## 2024-04-10 RX ORDER — FEZOLINETANT 45 MG/1
1 TABLET, FILM COATED ORAL DAILY
Qty: 90 TABLET | Refills: 0 | Status: SHIPPED | OUTPATIENT
Start: 2024-04-10 | End: 2024-04-12

## 2024-04-12 RX ORDER — FEZOLINETANT 45 MG/1
1 TABLET, FILM COATED ORAL DAILY
Qty: 90 TABLET | Refills: 0 | Status: SHIPPED | OUTPATIENT
Start: 2024-04-12 | End: 2024-08-01

## 2024-04-12 NOTE — TELEPHONE ENCOUNTER
Patient would like rerouted to local pharmacy.    Claire Escobar, PharmD  Clinical Pharmacist  [facility] Phone: [phone]  Direct Office Phone: [phone]

## 2024-05-31 NOTE — PROGRESS NOTES
Assessment & Plan   Problem List Items Addressed This Visit    None  Visit Diagnoses       Screening for cervical cancer    -  Primary    Relevant Orders    THINPREP TIS PAP AND HPV mRNA E6/E7 ([facility])    MI PELVIC EXAMINATION    Menopausal syndrome (hot flashes)        Relevant Medications    gabapentin (NEURONTIN) 100 MG capsule           1. Screening for cervical cancer  Pap today.  - THINPREP TIS PAP AND HPV mRNA E6/E7 ([facility])  - MI PELVIC EXAMINATION    2. Menopausal syndrome (hot flashes)  She is weaning off the medications, small amount refilled.  - gabapentin (NEURONTIN) 100 MG capsule; Take 1 capsule (100 mg) by mouth at bedtime  Dispense: 30 capsule; Refill: 0              FUTURE APPOINTMENTS:       - Follow-up visit as needed. We manage her chronic medical care.    No follow-ups on file.    Faye Watkins MD  [facility]    Subjective     Nursing Notes:   Sherrill Clemens CMA  6/7/2024 10:56 AM  Signed  Chief Complaint   Patient presents with    Repeat Pap Smear     Repeat pap smear     Pre-visit Screening:  Immunizations:  up to date  Colonoscopy:  is up to date  Mammogram: is up to date  Asthma Action Test/Plan:  NA  PHQ9:  NA  GAD7:  NA  Questioned patient about current smoking habits Pt. has never smoked.  Ok to leave detailed message on voice mail for today's visit only Yes, phone # [phone]       Cindy Mota is a 64 year old female who presents to clinic today for the following health issues   HPI     She is here for a refill on gabapentin for hot flashes.she is trying to wean down and off of this, needs a short term refill until she can stop it.    Also inadequate pap--needs a repeat pap. She has had hysterectomy but they left the cervix.        Review of Systems   Constitutional, HEENT, cardiovascular, pulmonary, gi and gu systems are negative, except as otherwise noted.      Objective    /78 (BP Location: Right arm, Patient Position: Sitting, Cuff Size: Adult  Regular)   Pulse 82   Temp 98.1  F (36.7  C) (Temporal)   LMP  (LMP Unknown)   SpO2 99%   There is no height or weight on file to calculate BMI.  Physical Exam   GENERAL: alert and no distress   (female): normal female external genitalia, normal urethral meatus, normal vaginal mucosa, cervix normal. Pap done.  MS: no gross musculoskeletal defects noted, no edema    No results found for any visits on 06/07/24.

## 2024-06-07 ENCOUNTER — OFFICE VISIT (OUTPATIENT)
Dept: FAMILY MEDICINE | Facility: CLINIC | Age: 64
End: 2024-06-07

## 2024-06-07 VITALS
SYSTOLIC BLOOD PRESSURE: 128 MMHG | OXYGEN SATURATION: 99 % | TEMPERATURE: 98.1 F | DIASTOLIC BLOOD PRESSURE: 78 MMHG | HEART RATE: 82 BPM

## 2024-06-07 DIAGNOSIS — Z12.4 SCREENING FOR CERVICAL CANCER: Primary | ICD-10-CM

## 2024-06-07 DIAGNOSIS — N95.1 MENOPAUSAL SYNDROME (HOT FLASHES): ICD-10-CM

## 2024-06-07 PROCEDURE — 88175 CYTOPATH C/V AUTO FLUID REDO: CPT | Mod: 90 | Performed by: FAMILY MEDICINE

## 2024-06-07 PROCEDURE — 99459 PELVIC EXAMINATION: CPT | Performed by: FAMILY MEDICINE

## 2024-06-07 PROCEDURE — G2211 COMPLEX E/M VISIT ADD ON: HCPCS | Performed by: FAMILY MEDICINE

## 2024-06-07 PROCEDURE — 87624 HPV HI-RISK TYP POOLED RSLT: CPT | Mod: 90 | Performed by: FAMILY MEDICINE

## 2024-06-07 PROCEDURE — 99214 OFFICE O/P EST MOD 30 MIN: CPT | Performed by: FAMILY MEDICINE

## 2024-06-07 RX ORDER — GABAPENTIN 100 MG/1
100 CAPSULE ORAL AT BEDTIME
Qty: 30 CAPSULE | Refills: 0 | Status: SHIPPED | OUTPATIENT
Start: 2024-06-07 | End: 2024-07-30

## 2024-06-07 NOTE — NURSING NOTE
Chief Complaint   Patient presents with    Repeat Pap Smear     Repeat pap smear     Pre-visit Screening:  Immunizations:  up to date  Colonoscopy:  is up to date  Mammogram: is up to date  Asthma Action Test/Plan:  NA  PHQ9:  NA  GAD7:  NA  Questioned patient about current smoking habits Pt. has never smoked.  Ok to leave detailed message on voice mail for today's visit only Yes, phone # [phone]

## 2024-07-02 ENCOUNTER — OFFICE VISIT (OUTPATIENT)
Dept: FAMILY MEDICINE | Facility: CLINIC | Age: 64
End: 2024-07-02

## 2024-07-02 VITALS
TEMPERATURE: 97.3 F | WEIGHT: 142 LBS | BODY MASS INDEX: 24.37 KG/M2 | SYSTOLIC BLOOD PRESSURE: 118 MMHG | DIASTOLIC BLOOD PRESSURE: 60 MMHG | OXYGEN SATURATION: 96 % | HEART RATE: 74 BPM

## 2024-07-02 DIAGNOSIS — M25.552 GREATER TROCHANTERIC PAIN SYNDROME OF BOTH LOWER EXTREMITIES: Primary | ICD-10-CM

## 2024-07-02 DIAGNOSIS — M25.551 GREATER TROCHANTERIC PAIN SYNDROME OF BOTH LOWER EXTREMITIES: Primary | ICD-10-CM

## 2024-07-02 PROCEDURE — 20610 DRAIN/INJ JOINT/BURSA W/O US: CPT | Mod: RT | Performed by: STUDENT IN AN ORGANIZED HEALTH CARE EDUCATION/TRAINING PROGRAM

## 2024-07-02 PROCEDURE — 99213 OFFICE O/P EST LOW 20 MIN: CPT | Mod: 25 | Performed by: STUDENT IN AN ORGANIZED HEALTH CARE EDUCATION/TRAINING PROGRAM

## 2024-07-02 RX ORDER — DICLOFENAC SODIUM 100 MG/1
100 TABLET, FILM COATED, EXTENDED RELEASE ORAL 2 TIMES DAILY
COMMUNITY
End: 2024-07-02

## 2024-07-02 RX ORDER — DICLOFENAC SODIUM 75 MG/1
75 TABLET, DELAYED RELEASE ORAL 2 TIMES DAILY PRN
Qty: 60 TABLET | Refills: 2 | Status: SHIPPED | OUTPATIENT
Start: 2024-07-02

## 2024-07-02 RX ORDER — ANTIOX #8/OM3/DHA/EPA/LUT/ZEAX 250-2.5 MG
CAPSULE ORAL 2 TIMES DAILY
COMMUNITY

## 2024-07-02 RX ORDER — TRIAMCINOLONE ACETONIDE 40 MG/ML
40 INJECTION, SUSPENSION INTRA-ARTICULAR; INTRAMUSCULAR ONCE
Status: COMPLETED | OUTPATIENT
Start: 2024-07-02 | End: 2024-07-02

## 2024-07-02 RX ADMIN — TRIAMCINOLONE ACETONIDE 40 MG: 40 INJECTION, SUSPENSION INTRA-ARTICULAR; INTRAMUSCULAR at 10:52

## 2024-07-02 RX ADMIN — TRIAMCINOLONE ACETONIDE 40 MG: 40 INJECTION, SUSPENSION INTRA-ARTICULAR; INTRAMUSCULAR at 10:53

## 2024-07-02 NOTE — PROGRESS NOTES
Assessment & Plan       ICD-10-CM    1. Greater trochanteric pain syndrome of both lower extremities  M25.551 diclofenac (VOLTAREN) 75 MG EC tablet    M25.552 triamcinolone (KENALOG-40) injection 40 mg     triamcinolone (KENALOG-40) injection 40 mg     DRAIN/INJECT LARGE JOINT/BURSA     DRAIN/INJECT LARGE JOINT/BURSA         Follow-up bilateral lateral hip pain. Hx and exam most c/w GTPS. Reviewed nature and treatment course thus far, future options. Elects bilateral steroid injections today with goal of improving sleep.     Reasons to follow-up sooner or seek emergent care reviewed.     20 minutes spent on the date of the encounter doing chart review, history and exam, documentation and further activities per the note excl procedure      Naveed Zelaya MD, [facility]      Subjective     Cindy Mota is a 64 year old female who presents to clinic today for the following health issues:    HPI   Chief Complaint   Patient presents with    Consult     Hip pain on both sides, sleeping is main problem, some pain during the day. Wants to talk options today, possibly cortisone shots in both hips.      Partial improvement after PRP in March.   Previous Left troch bursa inj aug 2023   Diclofenac gel helpful during the day but most sx at night-affecting sleep  Continuing HEP        Objective    /60 (BP Location: Left arm, Patient Position: Sitting, Cuff Size: Adult Large)   Pulse 74   Temp 97.3  F (36.3  C) (Temporal)   Wt 64.4 kg (142 lb)   LMP  (LMP Unknown)   SpO2 96%   BMI 24.37 kg/m    Body mass index is 24.37 kg/m .  Alert, NAD  NC/AT  Sclerae anicteric  Regular  Resp nonlabored  Skin warm and dry  No focal neuro deficits. Speech intact.   Appropriate affect    Full/symmetric pain free bilateral hip ROM  TTP over GT bilat only  Normal gait    Labs reviewed.          Right TROCHANTERIC BURSAL INJECTION:  The patient was informed of the risk and benefits and signed an informed  consent form. The patient agreed to proceed. The area of the R trochanteric bursa was prepped in a sterile fashion using chloral prep. 40 mg of Kenalog and 4 cc of 1% lidocaine was injected into the area. Patient tolerated procedure well and there were no complications. Following the procedure the patient had some relief of their symptoms. Postinjection instructions were given.    Left TROCHANTERIC BURSAL INJECTION:  The patient was informed of the risk and benefits and signed an informed consent form. The patient agreed to proceed. The area of the L trochanteric bursa was prepped in a sterile fashion using chloral prep. 40 mg of Kenalog and 4 cc of 1% lidocaine was injected into the area. Patient tolerated procedure well and there were no complications. Following the procedure the patient had some relief of their symptoms. Postinjection instructions were given.

## 2024-07-02 NOTE — NURSING NOTE
Chief Complaint   Patient presents with    Consult     Hip pain on both sides, sleeping is main problem, some pain during the day. Wants to talk options today, possibly cortisone shots in both hips.     Pre-visit Screening:  Immunizations:  up to date  Colonoscopy:  is up to date  Mammogram: is up to date  Asthma Action Test/Plan:  na  PHQ9:  na  GAD7:  na  Questioned patient about current smoking habits Pt. has never smoked.  Ok to leave detailed message on voice mail for today's visit only yes, phone # [phone] (home)

## 2024-07-29 DIAGNOSIS — N95.1 MENOPAUSAL SYNDROME (HOT FLASHES): ICD-10-CM

## 2024-07-30 RX ORDER — FEZOLINETANT 45 MG/1
1 TABLET, FILM COATED ORAL DAILY
COMMUNITY
Start: 2024-07-30

## 2024-07-30 RX ORDER — GABAPENTIN 100 MG/1
100 CAPSULE ORAL AT BEDTIME
COMMUNITY
Start: 2024-07-30

## 2024-07-30 NOTE — TELEPHONE ENCOUNTER
Cindy Mota is requesting a refill of:    Refused Prescriptions:                       Disp   Refills    gabapentin (NEURONTIN) 100 MG capsule [Pha*                Sig: TAKE 1 CAPSULE BY MOUTH AT BEDTIME  Refused By: SKY BOWER  Reason for Refusal: Medication has been discontinued    Pt was weaning off

## 2024-07-30 NOTE — TELEPHONE ENCOUNTER
Cindy Mota is requesting a refill of:    Refused Prescriptions:                       Disp   Refills    Fezolinetant (VEOZAH) 45 MG TABS [Pharmacy*                Sig: TAKE 1 TABLET BY MOUTH EVERY DAY  Refused By: HUBERT GALLO  Reason for Refusal: Patient needs appointment    Needs lab only for refills

## 2024-08-01 DIAGNOSIS — N95.1 MENOPAUSAL SYNDROME (HOT FLASHES): ICD-10-CM

## 2024-08-01 LAB
ALBUMIN SERPL-MCNC: 4.1 G/DL (ref 3.6–5.1)
ALP SERPL-CCNC: 70 U/L (ref 33–130)
ALT 1742-6: 17 U/L (ref 0–32)
AST 1920-8: 17 U/L (ref 0–35)
BILIRUB SERPL-MCNC: 0.4 MG/DL (ref 0.2–1.2)
BUN SERPL-MCNC: 14 MG/DL (ref 7–25)
BUN/CREATININE RATIO: 18 (ref 6–32)
CALCIUM SERPL-MCNC: 9.6 MG/DL (ref 8.6–10.3)
CHLORIDE SERPLBLD-SCNC: 104.6 MMOL/L (ref 98–110)
CO2 SERPL-SCNC: 29.7 MMOL/L (ref 20–32)
CREAT SERPL-MCNC: 0.78 MG/DL (ref 0.6–1.3)
GLUCOSE SERPL-MCNC: 93 MG/DL (ref 60–99)
POTASSIUM SERPL-SCNC: 4.2 MMOL/L (ref 3.5–5.3)
PROT SERPL-MCNC: 6.5 G/DL (ref 6.1–8.1)
SODIUM SERPL-SCNC: 138 MMOL/L (ref 135–146)

## 2024-08-01 PROCEDURE — 36415 COLL VENOUS BLD VENIPUNCTURE: CPT | Performed by: FAMILY MEDICINE

## 2024-08-01 PROCEDURE — 80053 COMPREHEN METABOLIC PANEL: CPT | Performed by: FAMILY MEDICINE

## 2024-08-01 RX ORDER — FEZOLINETANT 45 MG/1
1 TABLET, FILM COATED ORAL DAILY
Qty: 90 TABLET | Refills: 0 | Status: SHIPPED | OUTPATIENT
Start: 2024-08-01

## 2024-10-01 ENCOUNTER — OFFICE VISIT (OUTPATIENT)
Dept: FAMILY MEDICINE | Facility: CLINIC | Age: 64
End: 2024-10-01

## 2024-10-01 VITALS
WEIGHT: 142.6 LBS | HEIGHT: 64 IN | RESPIRATION RATE: 20 BRPM | TEMPERATURE: 97.4 F | SYSTOLIC BLOOD PRESSURE: 136 MMHG | DIASTOLIC BLOOD PRESSURE: 72 MMHG | BODY MASS INDEX: 24.34 KG/M2 | HEART RATE: 68 BPM

## 2024-10-01 DIAGNOSIS — F41.1 GENERALIZED ANXIETY DISORDER: ICD-10-CM

## 2024-10-01 DIAGNOSIS — N95.1 MENOPAUSAL SYNDROME (HOT FLASHES): ICD-10-CM

## 2024-10-01 DIAGNOSIS — Z13.220 SCREENING FOR LIPOID DISORDERS: ICD-10-CM

## 2024-10-01 DIAGNOSIS — M25.552 GREATER TROCHANTERIC PAIN SYNDROME OF BOTH LOWER EXTREMITIES: ICD-10-CM

## 2024-10-01 DIAGNOSIS — M25.551 GREATER TROCHANTERIC PAIN SYNDROME OF BOTH LOWER EXTREMITIES: ICD-10-CM

## 2024-10-01 DIAGNOSIS — I10 ESSENTIAL HYPERTENSION: Primary | ICD-10-CM

## 2024-10-01 LAB
CHOLEST SERPL-MCNC: 219 MG/DL (ref 0–199)
CHOLEST/HDLC SERPL: 2 {RATIO} (ref 0–5)
HDLC SERPL-MCNC: 96 MG/DL (ref 40–150)
LDLC SERPL CALC-MCNC: 106 MG/DL
TRIGL SERPL-MCNC: 86 MG/DL (ref 0–149)

## 2024-10-01 PROCEDURE — 80061 LIPID PANEL: CPT | Performed by: FAMILY MEDICINE

## 2024-10-01 PROCEDURE — 99214 OFFICE O/P EST MOD 30 MIN: CPT | Performed by: FAMILY MEDICINE

## 2024-10-01 PROCEDURE — 36415 COLL VENOUS BLD VENIPUNCTURE: CPT | Performed by: FAMILY MEDICINE

## 2024-10-01 PROCEDURE — G2211 COMPLEX E/M VISIT ADD ON: HCPCS | Performed by: FAMILY MEDICINE

## 2024-10-01 RX ORDER — LISINOPRIL 20 MG/1
20 TABLET ORAL DAILY
Qty: 90 TABLET | Refills: 1 | Status: SHIPPED | OUTPATIENT
Start: 2024-10-01

## 2024-10-01 RX ORDER — CICLOPIROX 80 MG/ML
SOLUTION TOPICAL
COMMUNITY
Start: 2024-08-03

## 2024-10-01 RX ORDER — SERTRALINE HYDROCHLORIDE 25 MG/1
25 TABLET, FILM COATED ORAL DAILY
Qty: 90 TABLET | Refills: 1 | Status: SHIPPED | OUTPATIENT
Start: 2024-10-01

## 2024-10-01 NOTE — NURSING NOTE
Cindy Mota is here for a medication check and refill.    Questioned patient about current smoking habits.  Pt. has never smoked.  PULSE regular  My Chart: active  CLASSIFICATION OF OVERWEIGHT AND OBESITY BY BMI                        Obesity Class           BMI(kg/m2)  Underweight                                    < 18.5  Normal                                         18.5-24.9  Overweight                                     25.0-29.9  OBESITY                     I                  30.0-34.9                             II                 35.0-39.9  EXTREME OBESITY             III                >40                            Patient's  BMI Body mass index is 24.48 kg/m .  http://hin.nhlbi.nih.gov/menuplanner/menu.cgi  Pre-visit planning  Immunizations - up to date  Colonoscopy - is up to date  Mammogram - is up to date  Asthma -   PHQ9 -    ANDREW-7 -      The patient has verbalized that it is ok to leave a detailed voice message on the patient's cell phone with results/recommendations from this visit.

## 2024-10-01 NOTE — PROGRESS NOTES
Assessment & Plan     Essential hypertension  Well controlled, continue current medications at current doses     Generalized anxiety disorder  Well controlled, continue current medications at current doses     Menopausal syndrome (hot flashes)  Doing well with veozah, continue current medications at current doses     Screening for lipoid disorders      Greater trochanteric pain syndrome of both lower extremities  Doing ok, continue current medications at current doses               FUTURE APPOINTMENTS:       - Follow-up visit in 6 mo  Regular exercise    No follow-ups on file.    Rony White is a 64 year old, presenting for the following health issues:  Recheck Medication    HPI       Hypertension Follow-up    Do you check your blood pressure regularly outside of the clinic? Yes   Are you following a low salt diet? No  Are your blood pressures ever more than 140 on the top number (systolic) OR more   than 90 on the bottom number (diastolic), for example 140/90? No    Anxiety   How are you doing with your anxiety since your last visit? No change  Are you having other symptoms that might be associated with anxiety? No  Have you had a significant life event? No   Are you feeling depressed? No  Do you have any concerns with your use of alcohol or other drugs? No    Social History     Tobacco Use    Smoking status: Never     Passive exposure: Never    Smokeless tobacco: Never   Substance Use Topics    Alcohol use: Yes     Comment: Socially    Drug use: No         10/12/2021    12:55 PM 5/10/2022    11:59 AM 4/26/2023     2:52 PM   ANDREW-7 SCORE   Total Score 0 1 0         10/12/2021    12:55 PM 5/10/2022    11:59 AM 4/26/2023     2:52 PM   PHQ   PHQ-9 Total Score 1 1 2   Q9: Thoughts of better off dead/self-harm past 2 weeks Not at all Not at all Not at all         4/26/2023     2:52 PM   Last PHQ-9   1.  Little interest or pleasure in doing things 0   2.  Feeling down, depressed, or hopeless 0   3.  Trouble  "falling or staying asleep, or sleeping too much 2   4.  Feeling tired or having little energy 0   5.  Poor appetite or overeating 0   6.  Feeling bad about yourself 0   7.  Trouble concentrating 0   8.  Moving slowly or restless 0   Q9: Thoughts of better off dead/self-harm past 2 weeks 0   PHQ-9 Total Score 2   Difficulty at work, home, or with people Not difficult at all         4/26/2023     2:52 PM   ANDREW-7    1. Feeling nervous, anxious, or on edge 0   2. Not being able to stop or control worrying 0   3. Worrying too much about different things 0   4. Trouble relaxing 0   5. Being so restless that it is hard to sit still 0   6. Becoming easily annoyed or irritable 0   7. Feeling afraid, as if something awful might happen 0   ANDREW-7 Total Score 0   If you checked any problems, how difficult have they made it for you to do your work, take care of things at home, or get along with other people? Not difficult at all     Hot flashes- pt doing well with Veozah, no side effects     Pt struggling with sleep due to hip pains- seeing sports med, wonders about trying CBD  How many servings of fruits and vegetables do you eat daily?  2-3  On average, how many sweetened beverages do you drink each day (Examples: soda, juice, sweet tea, etc.  Do NOT count diet or artificially sweetened beverages)?   1  How many days per week do you exercise enough to make your heart beat faster? 4  How many minutes a day do you exercise enough to make your heart beat faster? 30 - 60  How many days per week do you miss taking your medication? 0        Review of Systems  Constitutional, HEENT, cardiovascular, pulmonary, gi and gu systems are negative, except as otherwise noted.      Objective    /72 (BP Location: Right arm, Patient Position: Chair, Cuff Size: Adult Regular)   Pulse 68   Temp 97.4  F (36.3  C) (Temporal)   Resp 20   Ht 1.626 m (5' 4\")   Wt 64.7 kg (142 lb 9.6 oz)   LMP  (LMP Unknown)   BMI 24.48 kg/m    Body mass " index is 24.48 kg/m .  Physical Exam   GENERAL: alert and no distress  NECK: no adenopathy, no asymmetry, masses, or scars  RESP: lungs clear to auscultation - no rales, rhonchi or wheezes  CV: regular rate and rhythm, normal S1 S2, no S3 or S4, no murmur, click or rub, no peripheral edema  ABDOMEN: soft, nontender, no hepatosplenomegaly, no masses and bowel sounds normal  MS: no gross musculoskeletal defects noted, no edema  PSYCH: mentation appears normal, affect normal/bright    Orders Only on 08/01/2024   Component Date Value Ref Range Status    Carbon Dioxide 08/01/2024 29.7  20 - 32 mmol/L Final    Creatinine 08/01/2024 0.78  0.60 - 1.30 mg/dL Final    Glucose 08/01/2024 93  60 - 99 mg/dL Final    Sodium 08/01/2024 138.0  135 - 146 mmol/L Final    Potassium 08/01/2024 4.20  3.5 - 5.3 mmol/L Final    Chloride 08/01/2024 104.6  98 - 110 mmol/L Final    Protein Total 08/01/2024 6.5  6.1 - 8.1 g/dL Final    Albumin 08/01/2024 4.1  3.6 - 5.1 g/dL Final    Alkaline Phosphatase 08/01/2024 70  33 - 130 U/L Final    ALT 08/01/2024 17  0 - 32 U/L Final    AST 08/01/2024 17  0 - 35 U/L Final    Bilirubin Total 08/01/2024 0.4  0.2 - 1.2 mg/dL Final    Urea Nitrogen 08/01/2024 14  7 - 25 mg/dL Final    Calcium 08/01/2024 9.6  8.6 - 10.3 mg/dL Final    BUN/Creatinine Ratio 08/01/2024 18  6 - 32 Final           Signed Electronically by: Luis Dan MD

## 2024-10-09 DIAGNOSIS — N95.1 MENOPAUSAL SYNDROME (HOT FLASHES): ICD-10-CM

## 2024-10-09 RX ORDER — FEZOLINETANT 45 MG/1
1 TABLET, FILM COATED ORAL DAILY
Qty: 90 TABLET | Refills: 1 | Status: SHIPPED | OUTPATIENT
Start: 2024-10-09

## 2024-10-09 NOTE — TELEPHONE ENCOUNTER
Cindy Mota is requesting a refill of:    Pending Prescriptions:                       Disp   Refills    Fezolinetant (VEOZAH) 45 MG TABS [Pharmac*90 tab*1            Sig: TAKE 1 TABLET BY MOUTH EVERY DAY    Please close encounter if RX was sent. Thanks, Yue    
170.18

## 2024-10-24 ENCOUNTER — OFFICE VISIT (OUTPATIENT)
Dept: FAMILY MEDICINE | Facility: CLINIC | Age: 64
End: 2024-10-24

## 2024-10-24 VITALS
HEART RATE: 74 BPM | SYSTOLIC BLOOD PRESSURE: 114 MMHG | BODY MASS INDEX: 24.65 KG/M2 | WEIGHT: 143.6 LBS | OXYGEN SATURATION: 98 % | TEMPERATURE: 97.4 F | DIASTOLIC BLOOD PRESSURE: 70 MMHG

## 2024-10-24 DIAGNOSIS — R35.0 URINARY FREQUENCY: ICD-10-CM

## 2024-10-24 DIAGNOSIS — N30.00 ACUTE CYSTITIS WITHOUT HEMATURIA: Primary | ICD-10-CM

## 2024-10-24 LAB
APPEARANCE UR: ABNORMAL
BACTERIA, UR: ABNORMAL
BILIRUB UR QL: ABNORMAL
CASTS/LPF: 0
COLOR UR: YELLOW
EP/HPF: ABNORMAL
GLUCOSE URINE: ABNORMAL MG/DL
HGB UR QL: ABNORMAL
KETONES UR QL: ABNORMAL MG/DL
MISC.: ABNORMAL
NITRITE UR QL STRIP: ABNORMAL
PH UR STRIP: 5.5 PH (ref 5–7)
PROT UR QL: ABNORMAL MG/DL
RBC, UR MICRO: ABNORMAL (ref ?–2)
SP GR UR STRIP: 1.02
UROBILINOGEN UR QL STRIP: 0.2 EU/DL (ref 0.2–1)
WBC #/AREA URNS HPF: ABNORMAL /[HPF]
WBC, UR MICRO: ABNORMAL (ref ?–2)

## 2024-10-24 PROCEDURE — 81001 URINALYSIS AUTO W/SCOPE: CPT

## 2024-10-24 PROCEDURE — 99213 OFFICE O/P EST LOW 20 MIN: CPT

## 2024-10-24 PROCEDURE — 87086 URINE CULTURE/COLONY COUNT: CPT | Mod: 90

## 2024-10-24 RX ORDER — NITROFURANTOIN 25; 75 MG/1; MG/1
100 CAPSULE ORAL 2 TIMES DAILY
Qty: 14 CAPSULE | Refills: 0 | Status: SHIPPED | OUTPATIENT
Start: 2024-10-24 | End: 2024-10-31

## 2024-10-24 NOTE — PROGRESS NOTES
Assessment & Plan     1. Acute cystitis without hematuria (Primary)  - Symptoms and UA are concerning for a UTI. Will treat with Macrobid 100 mg BID x 7 days, RX sent, encouraged to take with food and a probiotic to prevent GI upset. Urine culture pending, patient will be notified of results on my chart. Encouraged patient to also drink plenty of fluids, alternate Tylenol/Ibuprofen, Azo and/or heating pad for pain, etc. Return to clinic and ER precautions discussed.   - nitroFURantoin macrocrystal-monohydrate (MACROBID) 100 MG capsule; Take 1 capsule (100 mg) by mouth 2 times daily for 7 days.  Dispense: 14 capsule; Refill: 0    2. Urinary frequency  - See above.   - URINALYSIS, ROUTINE ([facility])  - URINE CULTURE AEROBIC BACTERIAL ([facility])    Follow up as needed. Reasons to follow-up sooner or seek emergent care reviewed.     Linda Gonzales PA-C  [facility]       Subjective     Cindy Mota is a 64 year old female who presents to clinic today for the following health issues:    HPI   Chief Complaint   Patient presents with    UTI     Symptoms started yesterday with frequency, progressed into ache and pain in bladder, noticed cloudy urine this morning.       Cindy presents with concerns of a UTI. She notes her symptoms started yesterday with dysuria, urgency, frequency, and lower abdominal pain. She also notes her urine looked more cloudy this morning. She denies any symptoms of fevers/chills, flank pain, hematuria, nausea/vomiting, or any vaginal symptoms. She notes she has been taking Advil for pain and has been drinking plenty of fluids. She notes she does have a history of UTI's, her last one was in 01/2024 and was treated with Macrobid. She otherwise denies any history of kidney infections or kidney stones.     Medical history and daily medications reviewed.       Objective    /70 (BP Location: Right arm, Patient Position: Sitting, Cuff Size: Adult Regular)   Pulse 74   Temp 97.4  F  (36.3  C) (Temporal)   Wt 65.1 kg (143 lb 9.6 oz)   LMP  (LMP Unknown)   SpO2 98%   BMI 24.65 kg/m    Body mass index is 24.65 kg/m .    Physical Examination:  GENERAL: healthy, alert and no distress  EYES: Eyes grossly normal to inspection, PERRL and conjunctivae and sclerae normal  HENT: mouth without ulcers or lesions  NECK: no adenopathy, no asymmetry, masses, or scars and thyroid normal to palpation  RESP: lungs clear to auscultation - no rales, rhonchi or wheezes  CV: regular rate and rhythm, normal S1 S2, no S3 or S4, no murmur, click or rub, no peripheral edema   ABDOMEN: soft, nontender, no CVA tenderness bilaterally  MS: no gross musculoskeletal defects noted, no edema  SKIN: no suspicious lesions or rashes  PSYCH: mentation appears normal, affect normal/bright    Labs reviewed.  Results for orders placed or performed in visit on 10/24/24   URINALYSIS, ROUTINE ([facility])     Status: Abnormal   Result Value Ref Range    Color Urine Yellow     Appearance Urine Cloudy (A)     Glucose Urine Neg mg/dL    Bilirubin Urine Neg     Ketones Urine Neg mg/dL    Specific Gravity Urine 1.025     Blood Urine Trace (A)     pH Urine 5.5 pH    Protein Urine trace (A) neg - neg mg/dL    Urobilinogen Urine 0.2 EU/dL    Nitrite Urine Pos (A)     Leukocytes small (A)     Wbc, Urine Micro 30-60 (A) neg - 2    RBC Micro Urine 4-8 (A) neg - 2    EP/HPF small     Bacteria Urine mod (A) neg - neg    Casts/LPF 0     Miscellaneous few clumpy WBC, odor (A)

## 2024-10-24 NOTE — NURSING NOTE
Chief Complaint   Patient presents with    UTI     Symptoms started yesterday with frequency, progressed into ache and pain in bladder, noticed cloudy urine this morning.      Pre-visit Screening:  Immunizations:  up to date  Colonoscopy:  is up to date  Mammogram: is up to date  Asthma Action Test/Plan:    PHQ9:    GAD7:    Questioned patient about current smoking habits Pt. has never smoked.  Ok to leave detailed message on voice mail for today's visit only yes, phone # [phone] (home)

## 2024-10-26 LAB — URINE VOIDED CULTURE: ABNORMAL

## 2024-12-03 ENCOUNTER — TELEPHONE (OUTPATIENT)
Dept: FAMILY MEDICINE | Facility: CLINIC | Age: 64
End: 2024-12-03

## 2024-12-05 ENCOUNTER — PATIENT OUTREACH (OUTPATIENT)
Dept: CARE COORDINATION | Facility: CLINIC | Age: 64
End: 2024-12-05
Payer: COMMERCIAL

## 2024-12-20 ENCOUNTER — HOSPITAL ENCOUNTER (OUTPATIENT)
Dept: MAMMOGRAPHY | Facility: CLINIC | Age: 64
Discharge: HOME | End: 2024-12-20
Attending: FAMILY MEDICINE | Admitting: FAMILY MEDICINE
Payer: COMMERCIAL

## 2024-12-20 DIAGNOSIS — Z12.31 VISIT FOR SCREENING MAMMOGRAM: ICD-10-CM

## 2024-12-20 PROCEDURE — 77063 BREAST TOMOSYNTHESIS BI: CPT

## 2025-03-19 DIAGNOSIS — M25.552 GREATER TROCHANTERIC PAIN SYNDROME OF BOTH LOWER EXTREMITIES: ICD-10-CM

## 2025-03-19 DIAGNOSIS — M25.551 GREATER TROCHANTERIC PAIN SYNDROME OF BOTH LOWER EXTREMITIES: ICD-10-CM

## 2025-03-19 RX ORDER — DICLOFENAC SODIUM 75 MG/1
TABLET, DELAYED RELEASE ORAL
Qty: 60 TABLET | Refills: 0 | Status: SHIPPED | OUTPATIENT
Start: 2025-03-19

## 2025-03-19 NOTE — TELEPHONE ENCOUNTER
Pending Prescriptions:                       Disp   Refills    diclofenac (VOLTAREN) 75 MG EC tablet [Ph*60 tab*             Sig: TAKE ONE TABLET BY MOUTH TWICE DAILY AS NEEDED           FOR MODERATE PAIN     Dr. Zelaya please advise:    This was prescibed on 7-2-24  Do you need to see pt for further refill?  Kat

## 2025-04-20 ENCOUNTER — HEALTH MAINTENANCE LETTER (OUTPATIENT)
Age: 65
End: 2025-04-20

## 2025-04-20 DIAGNOSIS — I10 ESSENTIAL HYPERTENSION: ICD-10-CM

## 2025-04-21 RX ORDER — LISINOPRIL 20 MG/1
20 TABLET ORAL DAILY
COMMUNITY
Start: 2025-04-21

## 2025-04-21 NOTE — PROGRESS NOTES
Preventive Care Visit  [facility], [facility].  Faye Watkins MD, Family Medicine  Apr 23, 2025       SUBJECTIVE:   Cindy is a 65 year old, presenting for the following:  Recheck Medication (Refill medications) and Physical (Fasting today, she would like hemoglobin rechecked this was low when donating blood)      HPI    Here for a physical. She is fasting.  Due for labs and refills on her medications.  Blood pressure--controlled on medications.  Hot flashes--she is on sertraline and also veozah. These are mostly helping but still has some hot flashes. She is aware of liver risks from the veozah and said this will be her last year on it because once she is on medicare, likely no longer covered.  Would like to continue this for now.  Has problems with plantar faciitis, she uses a night splint, supportive shoes, is aware of [facility]. Doesn't want to do anything more for it  Using nightly diclofenac, sees Dr. Zelaya for injections.  Wants her cbc rechecked.it was recently low when she went to donate blood.          Have you ever done Advance Care Planning? (For example, a Health Directive, POLST, or a discussion with a medical provider or your loved ones about your wishes): No, advance care planning information given to patient to review.  Advanced care planning was discussed at today's visit.    Social History     Tobacco Use    Smoking status: Never     Passive exposure: Never    Smokeless tobacco: Never   Substance Use Topics    Alcohol use: Yes     Alcohol/week: 1.0 standard drink of alcohol     Types: 1 Standard drinks or equivalent per week     Comment: Socially              No data to display            No concerns  Reviewed orders with patient.  Reviewed health maintenance and updated orders accordingly - Yes  BP Readings from Last 3 Encounters:   04/23/25 118/74   12/18/24 112/72   10/24/24 114/70    Wt Readings from Last 3 Encounters:   04/23/25 65.3 kg (144 lb)   12/18/24 65.3 kg (144 lb)    10/24/24 65.1 kg (143 lb 9.6 oz)                  Patient Active Problem List   Diagnosis    ACP (advance care planning)    Osteoarthritis (arthritis due to wear and tear of joints)    Family history of first degree relative with dementia    Family history of cardiovascular disease    Generalized anxiety disorder    Hx of supraventricular tachycardia    Family history of malignant neoplasm of breast     Past Surgical History:   Procedure Laterality Date    HC LAPAROSCOPY, SURGICAL; CHOLECYSTECTOMY  08/01/2010    Cholecystectomy, Laparoscopic    HC TOOTH EXTRACTION W/FORCEP  age 33    wisdom    HYSTERECTOMY, PAP NO LONGER INDICATED  05/01/2007    Ovaries are in       Social History     Tobacco Use    Smoking status: Never     Passive exposure: Never    Smokeless tobacco: Never   Substance Use Topics    Alcohol use: Yes     Alcohol/week: 1.0 standard drink of alcohol     Types: 1 Standard drinks or equivalent per week     Comment: Socially     Family History   Problem Relation Age of Onset    Neurologic Disorder Mother 54        Pick's Disease    Prostate Cancer Father     Arthritis Father     C.A.D. Father 63        stent    Hypertension Father     Lipids Father     Coronary Artery Disease Father     Lipids Sister     Hypertension Sister          Current Outpatient Medications   Medication Sig Dispense Refill    diclofenac (VOLTAREN) 75 MG EC tablet TAKE ONE TABLET BY MOUTH TWICE DAILY AS NEEDED FOR MODERATE PAIN 60 tablet 0    Fezolinetant (VEOZAH) 45 MG TABS Take 1 tablet by mouth daily. 90 tablet 1    HEMP OIL OR EXTRACT OR OTHER CBD CANNABINOID, NOT MEDICAL CANNABIS, CBD gummie for sleep      lisinopril (ZESTRIL) 20 MG tablet Take 1 tablet (20 mg) by mouth daily. 90 tablet 1    Multiple Vitamins-Minerals (PRESERVISION AREDS 2) CAPS Take by mouth 2 times daily      sertraline (ZOLOFT) 25 MG tablet Take 1 tablet (25 mg) by mouth daily. 90 tablet 1       Breast Cancer Screening:    FHS-7:       11/3/2021     8:54  "AM 12/15/2022     8:55 AM 12/19/2023     7:59 AM 12/20/2024     2:15 PM   Breast CA Risk Assessment (FHS-7)   Did any of your first-degree relatives have breast or ovarian cancer? Yes Yes Yes Yes   Did any of your relatives have bilateral breast cancer? Yes No No    Did any man in your family have breast cancer? No No No No   Did any woman in your family have breast and ovarian cancer? No Yes No No   Did any woman in your family have breast cancer before age 50 y? Yes Yes Yes Yes   Do you have 2 or more relatives with breast and/or ovarian cancer? No No No No   Do you have 2 or more relatives with breast and/or bowel cancer? No No No No     Mammogram referral done.    Pertinent mammograms are reviewed under the imaging tab.      History of abnormal Pap smear: pap is up to date            Reviewed and updated as needed this visit by clinical staff   Tobacco  Allergies               Reviewed and updated as needed this visit by Provider                  Past Medical History:   Diagnosis Date    Basal cell carcinoma (BCC)     removed 6 yrs ago    Cancer (H)     IBS (irritable bowel syndrome)       Past Surgical History:   Procedure Laterality Date    HC LAPAROSCOPY, SURGICAL; CHOLECYSTECTOMY  08/01/2010    Cholecystectomy, Laparoscopic    HC TOOTH EXTRACTION W/FORCEP  age 33    wisdom    HYSTERECTOMY, PAP NO LONGER INDICATED  05/01/2007    Ovaries are in     Review of Systems    Review of Systems  Constitutional, HEENT, cardiovascular, pulmonary, gi and gu systems are negative, except as otherwise noted.    OBJECTIVE:   /74 (BP Location: Right arm, Patient Position: Sitting, Cuff Size: Adult Large)   Pulse 72   Temp 98.2  F (36.8  C) (Temporal)   Ht 1.626 m (5' 4\")   Wt 65.3 kg (144 lb)   LMP  (LMP Unknown)   SpO2 97%   BMI 24.72 kg/m     Estimated body mass index is 24.72 kg/m  as calculated from the following:    Height as of this encounter: 1.626 m (5' 4\").    Weight as of this encounter: 65.3 kg (144 " lb).  Physical Exam  GENERAL: alert and no distress  EYES: Eyes grossly normal to inspection, PERRL and conjunctivae and sclerae normal  HENT: ear canals and TM's normal, nose and mouth without ulcers or lesions  NECK: no adenopathy, no asymmetry, masses, or scars  RESP: lungs clear to auscultation - no rales, rhonchi or wheezes  BREAST: normal without masses, tenderness or nipple discharge and no palpable axillary masses or adenopathy  CV: regular rate and rhythm, normal S1 S2, no S3 or S4, no murmur, click or rub, no peripheral edema  ABDOMEN: soft, nontender, no hepatosplenomegaly, no masses and bowel sounds normal  MS: no gross musculoskeletal defects noted, no edema  SKIN: no suspicious lesions or rashes  NEURO: Normal strength and tone, mentation intact and speech normal  PSYCH: mentation appears normal, affect normal/bright  Declined pelvic    Diagnostic Test Results:  Labs reviewed in Epic  No results found for any visits on 04/23/25.    ASSESSMENT/PLAN:   1. Encounter for routine history and physical exam in female patient (Primary)    - PNEUMOCOCCAL 20 VALENT CONJUGATE (PREVNAR 20)    2. Essential hypertension  Controlled, check labs, refilled medications.  - lisinopril (ZESTRIL) 20 MG tablet; Take 1 tablet (20 mg) by mouth daily.  Dispense: 90 tablet; Refill: 1  - VENOUS COLLECTION  - Lipid Panel ([facility])  - Comprehensive Metobolic Panel ([facility])    3. Menopausal syndrome (hot flashes)  This seems to be working well for her. Refilled. Again discussed liver risks, she said she is aware and will only take this until she goes on medicare when it will likely no longer be covered.  - sertraline (ZOLOFT) 25 MG tablet; Take 1 tablet (25 mg) by mouth daily.  Dispense: 90 tablet; Refill: 1  - Fezolinetant (VEOZAH) 45 MG TABS; Take 1 tablet by mouth daily.  Dispense: 90 tablet; Refill: 1    4. Anemia due to other cause, not classified  Check today.  - HEMOGRAM PLATELET DIFF ([facility])    5. ACP (advance care  planning)  Discussed.    6. Family history of malignant neoplasm of breast  Advise to see genetic counselor. Referral done.  - Other Specialty Referral    7. Plantar fasciitis  She said she knows what to do about it. Discussed night splint, supportive shoewear, go to [facility].    8. Other type of osteoarthritis, unspecified site  She is followed by Dr. Zelaya. Tesha talked to her about more frequent appointments, especially if she finds she needs daily diclofenac     Patient has been advised of split billing requirements and indicates understanding: Yes      Counseling  Reviewed preventive health counseling, as reflected in patient instructions       Regular exercise       Healthy diet/nutrition        She reports that she has never smoked. She has never been exposed to tobacco smoke. She has never used smokeless tobacco.          Signed Electronically by: Faye Watkins MD

## 2025-04-21 NOTE — TELEPHONE ENCOUNTER
Received incoming refill request for  Pending Prescriptions:                       Disp   Refills    lisinopril (ZESTRIL) 20 MG tablet [Pharma*90 tab*1            Sig: TAKE 1 TABLET BY MOUTH EVERY DAY    Patient is due for their 6 month fasting medication check appt.   She is scheduled for 4/23/25 where full refills will be sent in.

## 2025-04-23 ENCOUNTER — OFFICE VISIT (OUTPATIENT)
Dept: FAMILY MEDICINE | Facility: CLINIC | Age: 65
End: 2025-04-23

## 2025-04-23 VITALS
SYSTOLIC BLOOD PRESSURE: 118 MMHG | HEIGHT: 64 IN | TEMPERATURE: 98.2 F | WEIGHT: 144 LBS | OXYGEN SATURATION: 97 % | HEART RATE: 72 BPM | DIASTOLIC BLOOD PRESSURE: 74 MMHG | BODY MASS INDEX: 24.59 KG/M2

## 2025-04-23 DIAGNOSIS — I10 ESSENTIAL HYPERTENSION: ICD-10-CM

## 2025-04-23 DIAGNOSIS — Z00.00 ENCOUNTER FOR ROUTINE HISTORY AND PHYSICAL EXAM IN FEMALE PATIENT: Primary | ICD-10-CM

## 2025-04-23 DIAGNOSIS — M19.90 OTHER TYPE OF OSTEOARTHRITIS, UNSPECIFIED SITE: ICD-10-CM

## 2025-04-23 DIAGNOSIS — N95.1 MENOPAUSAL SYNDROME (HOT FLASHES): ICD-10-CM

## 2025-04-23 DIAGNOSIS — D64.89 ANEMIA DUE TO OTHER CAUSE, NOT CLASSIFIED: ICD-10-CM

## 2025-04-23 DIAGNOSIS — Z71.89 ACP (ADVANCE CARE PLANNING): ICD-10-CM

## 2025-04-23 DIAGNOSIS — Z80.3 FAMILY HISTORY OF MALIGNANT NEOPLASM OF BREAST: ICD-10-CM

## 2025-04-23 DIAGNOSIS — M72.2 PLANTAR FASCIITIS: ICD-10-CM

## 2025-04-23 LAB
% GRANULOCYTES: 55.1 %
ALBUMIN SERPL-MCNC: 4.3 G/DL (ref 3.6–5.1)
ALP SERPL-CCNC: 76 U/L (ref 33–130)
ALT 1742-6: 28 U/L (ref 0–32)
AST 1920-8: 25 U/L (ref 0–35)
BILIRUB SERPL-MCNC: 0.6 MG/DL (ref 0.2–1.2)
BUN SERPL-MCNC: 12 MG/DL (ref 7–25)
BUN/CREATININE RATIO: 16 (ref 6–32)
CALCIUM SERPL-MCNC: 9.3 MG/DL (ref 8.6–10.3)
CHLORIDE SERPLBLD-SCNC: 109.3 MMOL/L (ref 98–110)
CHOLEST SERPL-MCNC: 229 MG/DL (ref 0–199)
CHOLEST/HDLC SERPL: 3 {RATIO} (ref 0–5)
CO2 SERPL-SCNC: 25.9 MMOL/L (ref 20–32)
CREAT SERPL-MCNC: 0.76 MG/DL (ref 0.6–1.3)
GLUCOSE SERPL-MCNC: 88 MG/DL (ref 60–99)
HCT VFR BLD AUTO: 41.7 % (ref 35–47)
HDLC SERPL-MCNC: 89 MG/DL (ref 40–150)
HEMOGLOBIN: 12.9 G/DL (ref 11.7–15.7)
LDLC SERPL CALC-MCNC: 123 MG/DL (ref 0–129)
LYMPHOCYTES NFR BLD AUTO: 34 %
MCH RBC QN AUTO: 29 PG (ref 26–33)
MCHC RBC AUTO-ENTMCNC: 30.9 G/DL (ref 31–36)
MCV RBC AUTO: 93.6 FL (ref 78–100)
MONOCYTES NFR BLD AUTO: 10.9 %
PLATELET COUNT - QUEST: 294 10^9/L (ref 150–375)
POTASSIUM SERPL-SCNC: 4.47 MMOL/L (ref 3.5–5.3)
PROT SERPL-MCNC: 6.7 G/DL (ref 6.1–8.1)
RBC # BLD AUTO: 4.45 10*12/L (ref 3.8–5.2)
SODIUM SERPL-SCNC: 138.1 MMOL/L (ref 135–146)
TRIGL SERPL-MCNC: 85 MG/DL (ref 0–149)
WBC # BLD AUTO: 6.4 10*9/L (ref 4–11)

## 2025-04-23 RX ORDER — LISINOPRIL 20 MG/1
20 TABLET ORAL DAILY
Qty: 90 TABLET | Refills: 1 | Status: SHIPPED | OUTPATIENT
Start: 2025-04-23

## 2025-04-23 RX ORDER — SERTRALINE HYDROCHLORIDE 25 MG/1
25 TABLET, FILM COATED ORAL DAILY
Qty: 90 TABLET | Refills: 1 | Status: SHIPPED | OUTPATIENT
Start: 2025-04-23

## 2025-04-23 RX ORDER — FEZOLINETANT 45 MG/1
1 TABLET, FILM COATED ORAL DAILY
Qty: 90 TABLET | Refills: 1 | Status: SHIPPED | OUTPATIENT
Start: 2025-04-23

## 2025-04-23 NOTE — NURSING NOTE
Chief Complaint   Patient presents with    Recheck Medication     Refill medications    Physical     Fasting today, she would like hemoglobin rechecked this was low when donating blood     Pre-visit Screening:  Immunizations:  not up to date - prevnar 20 today  Colonoscopy:  is up to date  Mammogram: is up to date  Asthma Action Test/Plan:  NA  PHQ9:  NA  GAD7:  NA  Questioned patient about current smoking habits Pt. has never smoked.  Ok to leave detailed message on voice mail for today's visit only Yes, phone # [phone]

## 2025-05-05 ENCOUNTER — OFFICE VISIT (OUTPATIENT)
Dept: FAMILY MEDICINE | Facility: CLINIC | Age: 65
End: 2025-05-05

## 2025-05-05 VITALS
HEART RATE: 69 BPM | WEIGHT: 142 LBS | SYSTOLIC BLOOD PRESSURE: 110 MMHG | DIASTOLIC BLOOD PRESSURE: 64 MMHG | BODY MASS INDEX: 24.37 KG/M2 | OXYGEN SATURATION: 96 %

## 2025-05-05 DIAGNOSIS — M25.551 BILATERAL HIP PAIN: Primary | ICD-10-CM

## 2025-05-05 DIAGNOSIS — Z79.1 LONG TERM (CURRENT) USE OF NON-STEROIDAL ANTI-INFLAMMATORIES (NSAID): ICD-10-CM

## 2025-05-05 DIAGNOSIS — M25.551 GREATER TROCHANTERIC PAIN SYNDROME OF BOTH LOWER EXTREMITIES: ICD-10-CM

## 2025-05-05 DIAGNOSIS — M25.552 GREATER TROCHANTERIC PAIN SYNDROME OF BOTH LOWER EXTREMITIES: ICD-10-CM

## 2025-05-05 DIAGNOSIS — M25.552 BILATERAL HIP PAIN: Primary | ICD-10-CM

## 2025-05-05 PROCEDURE — 99213 OFFICE O/P EST LOW 20 MIN: CPT | Performed by: STUDENT IN AN ORGANIZED HEALTH CARE EDUCATION/TRAINING PROGRAM

## 2025-05-05 PROCEDURE — 73521 X-RAY EXAM HIPS BI 2 VIEWS: CPT | Performed by: STUDENT IN AN ORGANIZED HEALTH CARE EDUCATION/TRAINING PROGRAM

## 2025-05-05 PROCEDURE — G2211 COMPLEX E/M VISIT ADD ON: HCPCS | Performed by: STUDENT IN AN ORGANIZED HEALTH CARE EDUCATION/TRAINING PROGRAM

## 2025-05-05 RX ORDER — TRIAMCINOLONE ACETONIDE 40 MG/ML
40 INJECTION, SUSPENSION INTRA-ARTICULAR; INTRAMUSCULAR ONCE
Status: DISCONTINUED | OUTPATIENT
Start: 2025-05-05 | End: 2025-05-05

## 2025-05-05 NOTE — PROGRESS NOTES
ASSESSMENT & PLAN    1. Bilateral hip pain (Primary)  2. Greater trochanteric pain syndrome of both lower extremities  3. Long term (current) use of non-steroidal anti-inflammatories (nsaid)  - diclofenac (VOLTAREN) 50 MG EC tablet; Take 1 tablet (50 mg) by mouth 2 times daily as needed for moderate pain.  Dispense: 180 tablet; Refill: 1  - XR Pelvis and Hip Bilateral 1 View   Updated XRs obtained and reviewed with patient, minimal OA which is occasionally symptomatic but much more bothered by troch bursitis. Reviewed tx options, patient elects to continue nsaids prn for now. Renal function reviewed, needs labs q6-12 mos. Will follow-up for steroid injections prior to summer vacation.   - diclofenac (VOLTAREN) 50 MG EC tablet; Take 1 tablet (50 mg) by mouth 2 times daily as needed for moderate pain.  Dispense: 180 tablet; Refill: 1  - XR Pelvis and Hip Bilateral 1 View    Naveed Zelaya MD, [facility]  Primary Care Sports Medicine   -----    SUBJECTIVE:  Cindy Mota is a 65 year old female who is seen in follow-up for   Nursing Notes:   Tesha Caballero, JULIÁN  5/5/2025  2:14 PM  Signed  Chief Complaint   Patient presents with    Consult For     Follow up on bursitis of both hips, left causes more pain but wants to repeat injection in both today, last injections done in July, works very well for 3 weeks and starts to feel discomfort coming after that but started to become worse recently, gets worse when it is nicer weather and being outside more doing things      Pre-visit Screening:  Immunizations:  up to date  Colonoscopy:  is up to date  Mammogram: is up to date  Asthma Action Test/Plan:  na  PHQ9:  na  GAD7:  na  Questioned patient about current smoking habits Pt. has never smoked.  Ok to leave detailed message on voice mail for today's visit only yes, phone # [phone] (home)        They were last seen 7/2/24    Since their last visit reports 0% - (About the same as last time).     They have tried rest/activity  avoidance, ibuprofen, home exercises, and PRP and steroid injections.      The patient is seen by themselves.    Patient's past medical, surgical, social, and family histories were reviewed today and no changes are noted.      OBJECTIVE:  /64 (BP Location: Left arm, Patient Position: Sitting, Cuff Size: Adult Large)   Pulse 69   Wt 64.4 kg (142 lb)   LMP  (LMP Unknown)   SpO2 96%   BMI 24.37 kg/m     Alert, NAD  NC/AT  Sclerae anicteric  Regular  Resp nonlabored  Skin warm and dry  No focal neuro deficits. Speech intact. Normal gait.  Appropriate affect    Full/symmetric pain free bilateral hip ROM  TTP over GT bilat only  Normal gait    Imaging:  Results for orders placed or performed in visit on 05/05/25   XR Pelvis and Hip Bilateral 1 View     Status: None    Narrative    Radiologist Consultation/:   Fax:  [phone]  [phone]  _____________________________________________________________________________________________________________________________________________________________________________________________________________________________________________________________________________________________________________________________________________________________________________________________________________________________________________________________________________________________________  PATIENT NAME: BONG MORFIN  YOB: 1960 Age: 65 ACCESSION NUMBER: [id number]  SEX: F ORDERING PROVIDER: Naveed Zelaya  FACILITY[facility] PRIMARY PROVIDER:  PATIENT ID: [id number] INTERESTED PARTY:  Page 1 of  1  _________________________________________________________________________________________________________________________________________________________________________________________________________________________________________________________________________________________________________________________________________________________________________________________  EXAM: XRAY PELVIS,3+ VWS  LOCATION: [place]  DATE: 5/5/2025  INDICATION: Bilateral hip/pelvic pain.  COMPARISON: None.  IMPRESSION: No significant joint space narrowing involving either hip. There is some tiny marginal  osteophyte formation along the left acetabular rim in keeping with mild arthritic change. No acute fracture or  dislocation on either side.  SIGNED BY: Manny Martinez MD 5/5/2025 9:50 PM

## 2025-05-20 ENCOUNTER — OFFICE VISIT (OUTPATIENT)
Dept: DERMATOLOGY | Facility: CLINIC | Age: 65
End: 2025-05-20
Attending: PHYSICIAN ASSISTANT
Payer: COMMERCIAL

## 2025-05-20 ENCOUNTER — OFFICE VISIT (OUTPATIENT)
Dept: FAMILY MEDICINE | Facility: CLINIC | Age: 65
End: 2025-05-20

## 2025-05-20 VITALS
TEMPERATURE: 97.8 F | HEART RATE: 76 BPM | HEIGHT: 64 IN | DIASTOLIC BLOOD PRESSURE: 84 MMHG | RESPIRATION RATE: 20 BRPM | OXYGEN SATURATION: 99 % | BODY MASS INDEX: 24.24 KG/M2 | SYSTOLIC BLOOD PRESSURE: 128 MMHG | WEIGHT: 142 LBS

## 2025-05-20 DIAGNOSIS — Z12.83 SKIN CANCER SCREENING: ICD-10-CM

## 2025-05-20 DIAGNOSIS — L82.1 SEBORRHEIC KERATOSES: ICD-10-CM

## 2025-05-20 DIAGNOSIS — L82.0 INFLAMED SEBORRHEIC KERATOSIS: Primary | ICD-10-CM

## 2025-05-20 DIAGNOSIS — R30.0 DYSURIA: ICD-10-CM

## 2025-05-20 DIAGNOSIS — Z85.828 HISTORY OF BASAL CELL CARCINOMA: ICD-10-CM

## 2025-05-20 DIAGNOSIS — N30.00 ACUTE CYSTITIS WITHOUT HEMATURIA: Primary | ICD-10-CM

## 2025-05-20 DIAGNOSIS — L81.4 SOLAR LENTIGO: ICD-10-CM

## 2025-05-20 DIAGNOSIS — D22.9 MULTIPLE BENIGN NEVI: ICD-10-CM

## 2025-05-20 DIAGNOSIS — D18.01 CHERRY ANGIOMA: ICD-10-CM

## 2025-05-20 LAB
APPEARANCE UR: ABNORMAL
BACTERIA, UR: ABNORMAL
BILIRUB UR QL: ABNORMAL
CASTS/LPF: ABNORMAL
COLOR UR: YELLOW
EP/HPF: ABNORMAL
GLUCOSE URINE: ABNORMAL MG/DL
HGB UR QL: ABNORMAL
KETONES UR QL: ABNORMAL MG/DL
MISC.: ABNORMAL
NITRITE UR QL STRIP: ABNORMAL
PH UR STRIP: 7.5 PH (ref 5–7)
PROT UR QL: ABNORMAL MG/DL
RBC, UR MICRO: ABNORMAL (ref ?–2)
SP GR UR STRIP: 1.01
UROBILINOGEN UR QL STRIP: 0.2 EU/DL (ref 0.2–1)
WBC #/AREA URNS HPF: ABNORMAL /[HPF]
WBC, UR MICRO: ABNORMAL (ref ?–2)

## 2025-05-20 PROCEDURE — 81001 URINALYSIS AUTO W/SCOPE: CPT

## 2025-05-20 PROCEDURE — 87088 URINE BACTERIA CULTURE: CPT | Mod: 90

## 2025-05-20 PROCEDURE — G2211 COMPLEX E/M VISIT ADD ON: HCPCS

## 2025-05-20 PROCEDURE — 99213 OFFICE O/P EST LOW 20 MIN: CPT

## 2025-05-20 PROCEDURE — 87186 SC STD MICRODIL/AGAR DIL: CPT | Mod: 90

## 2025-05-20 PROCEDURE — 87086 URINE CULTURE/COLONY COUNT: CPT | Mod: 90

## 2025-05-20 RX ORDER — NITROFURANTOIN 25; 75 MG/1; MG/1
100 CAPSULE ORAL 2 TIMES DAILY
Qty: 14 CAPSULE | Refills: 0 | Status: SHIPPED | OUTPATIENT
Start: 2025-05-20 | End: 2025-05-27

## 2025-05-20 ASSESSMENT — PAIN SCALES - GENERAL: PAINLEVEL_OUTOF10: NO PAIN (0)

## 2025-05-20 NOTE — NURSING NOTE
Cindy Mota is here for a possible UTI. Sx include pain, frequency and odorous for the past day.    Questioned patient about current smoking habits.  Pt. has never smoked.  Body mass index is 24.37 kg/m .  PULSE regular  My Chart: active  CLASSIFICATION OF OVERWEIGHT AND OBESITY BY BMI                        Obesity Class           BMI(kg/m2)  Underweight                                    < 18.5  Normal                                         18.5-24.9  Overweight                                     25.0-29.9  OBESITY                     I                  30.0-34.9                             II                 35.0-39.9  EXTREME OBESITY             III                >40                            Patient's  BMI Body mass index is 24.37 kg/m .  http://hin.nhlbi.nih.gov/menuplanner/menu.cgi    Pre-visit planning  Immunizations - up to date  Colonoscopy - is up to date  Mammogram - is up to date  Asthma -   PHQ9 -    ANDREW-7 -      The patient has verbalized that it is ok to leave a detailed voice message on the patient's cell phone with results/recommendations from this visit.

## 2025-05-20 NOTE — PATIENT INSTRUCTIONS

## 2025-05-20 NOTE — PROGRESS NOTES
[facility] Dermatology Note  Encounter Date: May 20, 2025  Office Visit     Dermatology Problem List:  1. History of NMSC  - BCC, abdomen  2. Verruca vulgaris, L subungual thumb   - s/p cryotherapy 10/1/2021, 2/28/2023, 5/15/2023  3. Corn,  - L plantar surface, s/p pair  4. ISK, s/p cryotherapy    Last FBSE: 05/20/2025    ____________________________________________    Assessment & Plan:    # Skin cancer screening with multiple benign nevi, solar lentigines  - ABCDEs: Counseled ABCDEs of melanoma: Asymmetry, Border (irregularity), Color (not uniform, changes in color), Diameter (greater than 6 mm which is about the size of a pencil eraser), and Evolving (any changes in preexisting moles).  - Sun protection: Counseled SPF30+ sunscreen, UPF clothing, sun avoidance, tanning bed avoidance.    # Cherry angiomas and seborrheic keratoses,  Benign, reassurance given.     # Hx NMSC.  - No signs of recurrence  - Continue regular skin examinations  - Sun precaution was advised including the use of sun screens of SPF 30 or higher, sun protective clothing, and avoidance of tanning beds.    # Inflamed Seborrheic Keratosis, upper forehead x 7.  - cryotherapy performed today, see procedure note below    Procedures Performed:   Cryotherapy procedure note: After verbal consent and discussion of risks and benefits including but no limited to dyspigmentation/scar, blister, and pain, 7 ISK was(were) treated with 1-2mm freeze border for 2 cycles with liquid nitrogen. Post cryotherapy instructions were provided.     Follow-up: 1 year(s) in-person, or earlier for new or changing lesions    Staff and Scribe:   Scribe Disclosure:   By signing my name below, I, Essie Woodruff, attest that this documentation has been prepared under the direction and in the presence of Lory Chau PA-C.  - Electronically Signed: Essie Woodruff 05/20/25       Provider Disclosure:   The documentation recorded by the scribe accurately  reflects the services I personally performed and the decisions made by me.    All risks, benefits and alternatives were discussed with patient.  Patient is in agreement and understands the assessment and plan.  All questions were answered.  Sun Screen Education was given.   Return to Clinic annually or sooner as needed.   Lory Chau PA-C   [facility]      ____________________________________________    CC: Skin Check    HPI:  Ms. Cindy Mota is a(n) 65 year old female who presents today as a return patient for a FBSE.     Patient reports some sun damaged areas that have appeared. She notes the previous areas treated with cryotherapy healed well.    Patient is otherwise feeling well, without additional skin concerns.    Labs Reviewed:  N/A    Physical exam:  Vitals: LMP  (LMP Unknown)   Breastfeeding No   GEN: This is a well developed, well-nourished female in no acute distress, in a pleasant mood.    SKIN: Full skin, which includes the head/face, both arms, chest, back, abdomen,both legs, genitalia and/or groin buttocks, digits and/or nails, was examined.  - There are tan to brown waxy stuck on papules with surrounding erythema on the L upper forehead and R upper forehead x7.   - hypopigmented atrophic patch on L abdomen  - There are dome shaped bright red papules on the head/neck, trunk, extremities.   - Multiple regular brown pigmented macules and papules are identified on the head/neck, trunk, extremities.   - Scattered brown macules on sun exposed areas.  - There are waxy stuck on tan to brown papules on the head/neck, trunk, extremities.  - No other lesions of concern on areas examined.       Medications:  Current Outpatient Medications   Medication Sig Dispense Refill    diclofenac (VOLTAREN) 50 MG EC tablet Take 1 tablet (50 mg) by mouth 2 times daily as needed for moderate pain. 180 tablet 1    diclofenac (VOLTAREN) 75 MG EC tablet TAKE ONE TABLET BY MOUTH TWICE  DAILY AS NEEDED FOR MODERATE PAIN 60 tablet 0    Fezolinetant (VEOZAH) 45 MG TABS Take 1 tablet by mouth daily. 90 tablet 1    HEMP OIL OR EXTRACT OR OTHER CBD CANNABINOID, NOT MEDICAL CANNABIS, CBD gummie for sleep      lisinopril (ZESTRIL) 20 MG tablet Take 1 tablet (20 mg) by mouth daily. 90 tablet 1    Multiple Vitamins-Minerals (PRESERVISION AREDS 2) CAPS Take by mouth 2 times daily      sertraline (ZOLOFT) 25 MG tablet Take 1 tablet (25 mg) by mouth daily. 90 tablet 1     No current facility-administered medications for this visit.      Past Medical History:   Patient Active Problem List   Diagnosis    ACP (advance care planning)    Osteoarthritis (arthritis due to wear and tear of joints)    Family history of first degree relative with dementia    Family history of cardiovascular disease    Generalized anxiety disorder    Hx of supraventricular tachycardia    Family history of malignant neoplasm of breast     Past Medical History:   Diagnosis Date    Basal cell carcinoma (BCC)     removed 6 yrs ago    Cancer (H)     IBS (irritable bowel syndrome)         CC Referred Self, MD  No address on file on close of this encounter.

## 2025-05-20 NOTE — LETTER
5/20/2025      Cindy Mota  [address] [facility] [address]      Dear Colleague,    Thank you for referring your patient, Cindy Mota, to the [facility]. Please see a copy of my visit note below.    [facility] Dermatology Note  Encounter Date: May 20, 2025  Office Visit     Dermatology Problem List:  1. History of NMSC  - BCC, abdomen  2. Verruca vulgaris, L subungual thumb   - s/p cryotherapy 10/1/2021, 2/28/2023, 5/15/2023  3. Corn,  - L plantar surface, s/p pair  4. ISK, s/p cryotherapy    Last FBSE: 05/20/2025    ____________________________________________    Assessment & Plan:    # Skin cancer screening with multiple benign nevi, solar lentigines  - ABCDEs: Counseled ABCDEs of melanoma: Asymmetry, Border (irregularity), Color (not uniform, changes in color), Diameter (greater than 6 mm which is about the size of a pencil eraser), and Evolving (any changes in preexisting moles).  - Sun protection: Counseled SPF30+ sunscreen, UPF clothing, sun avoidance, tanning bed avoidance.    # Cherry angiomas and seborrheic keratoses,  Benign, reassurance given.     # Hx NMSC.  - No signs of recurrence  - Continue regular skin examinations  - Sun precaution was advised including the use of sun screens of SPF 30 or higher, sun protective clothing, and avoidance of tanning beds.    # Inflamed Seborrheic Keratosis, upper forehead x 7.  - cryotherapy performed today, see procedure note below    Procedures Performed:   Cryotherapy procedure note: After verbal consent and discussion of risks and benefits including but no limited to dyspigmentation/scar, blister, and pain, 7 ISK was(were) treated with 1-2mm freeze border for 2 cycles with liquid nitrogen. Post cryotherapy instructions were provided.     Follow-up: 1 year(s) in-person, or earlier for new or changing lesions    Staff and Scribe:   Scribe Disclosure:   By signing my name below, Essie BRASWELL  Kacy, attest that this documentation has been prepared under the direction and in the presence of Lory Chau PA-C.  - Electronically Signed: Essie Woodruff 05/20/25       Provider Disclosure:   The documentation recorded by the scribe accurately reflects the services I personally performed and the decisions made by me.    All risks, benefits and alternatives were discussed with patient.  Patient is in agreement and understands the assessment and plan.  All questions were answered.  Sun Screen Education was given.   Return to Clinic annually or sooner as needed.   Lory Chau PA-C   [facility]      ____________________________________________    CC: Skin Check    HPI:  Ms. Cindy Mota is a(n) 65 year old female who presents today as a return patient for a FBSE.     Patient reports some sun damaged areas that have appeared. She notes the previous areas treated with cryotherapy healed well.    Patient is otherwise feeling well, without additional skin concerns.    Labs Reviewed:  N/A    Physical exam:  Vitals: LMP  (LMP Unknown)   Breastfeeding No   GEN: This is a well developed, well-nourished female in no acute distress, in a pleasant mood.    SKIN: Full skin, which includes the head/face, both arms, chest, back, abdomen,both legs, genitalia and/or groin buttocks, digits and/or nails, was examined.  - There are tan to brown waxy stuck on papules with surrounding erythema on the L upper forehead and R upper forehead x7.   - hypopigmented atrophic patch on L abdomen  - There are dome shaped bright red papules on the head/neck, trunk, extremities.   - Multiple regular brown pigmented macules and papules are identified on the head/neck, trunk, extremities.   - Scattered brown macules on sun exposed areas.  - There are waxy stuck on tan to brown papules on the head/neck, trunk, extremities.  - No other lesions of concern on areas examined.       Medications:  Current  Outpatient Medications   Medication Sig Dispense Refill     diclofenac (VOLTAREN) 50 MG EC tablet Take 1 tablet (50 mg) by mouth 2 times daily as needed for moderate pain. 180 tablet 1     diclofenac (VOLTAREN) 75 MG EC tablet TAKE ONE TABLET BY MOUTH TWICE DAILY AS NEEDED FOR MODERATE PAIN 60 tablet 0     Fezolinetant (VEOZAH) 45 MG TABS Take 1 tablet by mouth daily. 90 tablet 1     HEMP OIL OR EXTRACT OR OTHER CBD CANNABINOID, NOT MEDICAL CANNABIS, CBD gummie for sleep       lisinopril (ZESTRIL) 20 MG tablet Take 1 tablet (20 mg) by mouth daily. 90 tablet 1     Multiple Vitamins-Minerals (PRESERVISION AREDS 2) CAPS Take by mouth 2 times daily       sertraline (ZOLOFT) 25 MG tablet Take 1 tablet (25 mg) by mouth daily. 90 tablet 1     No current facility-administered medications for this visit.      Past Medical History:   Patient Active Problem List   Diagnosis     ACP (advance care planning)     Osteoarthritis (arthritis due to wear and tear of joints)     Family history of first degree relative with dementia     Family history of cardiovascular disease     Generalized anxiety disorder     Hx of supraventricular tachycardia     Family history of malignant neoplasm of breast     Past Medical History:   Diagnosis Date     Basal cell carcinoma (BCC)     removed 6 yrs ago     Cancer (H)      IBS (irritable bowel syndrome)         CC Referred Self, MD  No address on file on close of this encounter.      Again, thank you for allowing me to participate in the care of your patient.        Sincerely,        Lory Chau PA-C    Electronically signed

## 2025-05-20 NOTE — PROGRESS NOTES
"Assessment & Plan     1. Acute cystitis without hematuria (Primary)  - Symptoms and UA results consistent with UTI. Will treat with Macrobid 100 mg BID x 7 days, RX sent, side effects reviewed. Encouraged Cindy to push plenty of fluids, Tylenol and/or Azo for pain control, etc. Urine culture pending, will send my chart with results. Return to clinic and ER precautions discussed.   - nitroFURantoin macrocrystal-monohydrate (MACROBID) 100 MG capsule; Take 1 capsule (100 mg) by mouth 2 times daily for 7 days.  Dispense: 14 capsule; Refill: 0    2. Dysuria  - See above.   - URINALYSIS, ROUTINE ([facility])  - URINE CULTURE AEROBIC BACTERIAL ([facility])    Follow up as needed. Reasons to follow-up sooner or seek emergent care reviewed.     Linda Gonzales PA-C  [facility]       Subjective     Cindy Mota is a 65 year old female who presents to clinic today for the following health issues:    HPI   Chief Complaint   Patient presents with    UTI      Cindy presents today with concerns of a UTI. States her symptoms started yesterday around noon with lower bladder pressure, urgency, frequency, dysuria, and an odor. She denies any symptoms of fevers/chills, hematuria, nausea, vomiting, or any vaginal symptoms. States UTI's seem to triggered following intercourse. Took some Tylenol for her symptoms this morning which helped with the pain.     Daily medications reviewed.       Objective    /84 (BP Location: Right arm, Patient Position: Chair, Cuff Size: Adult Regular)   Pulse 76   Temp 97.8  F (36.6  C) (Temporal)   Resp 20   Ht 1.626 m (5' 4\")   Wt 64.4 kg (142 lb)   LMP  (LMP Unknown)   BMI 24.37 kg/m    Body mass index is 24.37 kg/m .    Physical Examination:  GENERAL: healthy, alert and no distress  EYES: Eyes grossly normal to inspection, PERRL and conjunctivae and sclerae normal  HENT: mouth without ulcers or lesions  NECK: no adenopathy, no asymmetry, masses, or scars and thyroid normal to " palpation  RESP: lungs clear to auscultation - no rales, rhonchi or wheezes  CV: regular rate and rhythm, normal S1 S2, no S3 or S4, no murmur, click or rub, no peripheral edema   ABDOMEN: soft, nontender, no costovertebral tenderness bilaterally  MS: no gross musculoskeletal defects noted, no edema  SKIN: no suspicious lesions or rashes  PSYCH: mentation appears normal, affect normal/bright    Labs reviewed.  Results for orders placed or performed in visit on 05/20/25   URINALYSIS, ROUTINE ([facility])     Status: Abnormal   Result Value Ref Range    Color Urine Yellow     Appearance Urine Slightly Cloudy (A)     Glucose Urine Neg mg/dL    Bilirubin Urine Neg     Ketones Urine Neg mg/dL    Specific Gravity Urine 1.015     Blood Urine Neg     pH Urine 7.5 pH    Protein Urine neg neg - neg mg/dL    Urobilinogen Urine 0.2 EU/dL    Nitrite Urine Neg     Leukocytes SMALL (A)     Wbc, Urine Micro 5-10 (A) neg - 2    RBC Micro Urine 0-2 neg - 2    EP/HPF OCC     Bacteria Urine SMALL (A) neg - neg    Casts/LPF neg     Miscellaneous neg    URINE CULTURE AEROBIC BACTERIAL ([facility])     Status: Abnormal   Result Value Ref Range    Urine Voided Culture SEE NOTE (A)

## 2025-05-22 LAB — URINE VOIDED CULTURE: ABNORMAL

## 2025-05-23 ENCOUNTER — RESULTS FOLLOW-UP (OUTPATIENT)
Dept: FAMILY MEDICINE | Facility: CLINIC | Age: 65
End: 2025-05-23

## 2025-06-10 ENCOUNTER — TELEPHONE (OUTPATIENT)
Dept: FAMILY MEDICINE | Facility: CLINIC | Age: 65
End: 2025-06-10

## 2025-06-10 NOTE — TELEPHONE ENCOUNTER
Medications that need to be tried and failed: Paroxetine, citalopram and escitalopram.  Has tried venlafaxine.

## 2025-06-29 ENCOUNTER — MYC REFILL (OUTPATIENT)
Dept: FAMILY MEDICINE | Facility: CLINIC | Age: 65
End: 2025-06-29

## 2025-06-29 DIAGNOSIS — N95.1 MENOPAUSAL SYNDROME (HOT FLASHES): ICD-10-CM

## 2025-06-30 RX ORDER — FEZOLINETANT 45 MG/1
1 TABLET, FILM COATED ORAL DAILY
Qty: 90 TABLET | Refills: 0 | Status: SHIPPED | OUTPATIENT
Start: 2025-06-30

## 2025-06-30 NOTE — TELEPHONE ENCOUNTER
Cindy Mota is requesting a refill of:    Pending Prescriptions:                       Disp   Refills    Fezolinetant (VEOZAH) 45 MG TABS          90 tab*0            Sig: Take 1 tablet by mouth daily.    Please close encounter if RX was sent. Thanks, Yue

## 2025-08-05 DIAGNOSIS — N95.1 MENOPAUSAL SYNDROME (HOT FLASHES): Primary | ICD-10-CM

## 2025-08-05 RX ORDER — FEZOLINETANT 45 MG/1
1 TABLET, FILM COATED ORAL DAILY
Qty: 60 TABLET | Refills: 0 | Status: SHIPPED | OUTPATIENT
Start: 2025-08-05

## 2025-08-13 ENCOUNTER — OFFICE VISIT (OUTPATIENT)
Dept: FAMILY MEDICINE | Facility: CLINIC | Age: 65
End: 2025-08-13

## 2025-08-13 VITALS
DIASTOLIC BLOOD PRESSURE: 78 MMHG | BODY MASS INDEX: 24.72 KG/M2 | WEIGHT: 144 LBS | HEART RATE: 72 BPM | SYSTOLIC BLOOD PRESSURE: 122 MMHG | OXYGEN SATURATION: 98 %

## 2025-08-13 DIAGNOSIS — M79.671 PAIN OF RIGHT HEEL: ICD-10-CM

## 2025-08-13 DIAGNOSIS — M25.551 GREATER TROCHANTERIC PAIN SYNDROME OF BOTH LOWER EXTREMITIES: Primary | ICD-10-CM

## 2025-08-13 DIAGNOSIS — R26.89 BALANCE PROBLEMS: ICD-10-CM

## 2025-08-13 DIAGNOSIS — M25.552 GREATER TROCHANTERIC PAIN SYNDROME OF BOTH LOWER EXTREMITIES: Primary | ICD-10-CM

## 2025-08-13 PROCEDURE — 20610 DRAIN/INJ JOINT/BURSA W/O US: CPT | Mod: RT | Performed by: STUDENT IN AN ORGANIZED HEALTH CARE EDUCATION/TRAINING PROGRAM

## 2025-08-13 PROCEDURE — 99213 OFFICE O/P EST LOW 20 MIN: CPT | Mod: 25 | Performed by: STUDENT IN AN ORGANIZED HEALTH CARE EDUCATION/TRAINING PROGRAM

## 2025-08-13 RX ORDER — TRIAMCINOLONE ACETONIDE 40 MG/ML
40 INJECTION, SUSPENSION INTRA-ARTICULAR; INTRAMUSCULAR ONCE
Status: COMPLETED | OUTPATIENT
Start: 2025-08-13 | End: 2025-08-13

## 2025-08-13 RX ADMIN — TRIAMCINOLONE ACETONIDE 40 MG: 40 INJECTION, SUSPENSION INTRA-ARTICULAR; INTRAMUSCULAR at 16:16

## 2025-08-13 RX ADMIN — TRIAMCINOLONE ACETONIDE 40 MG: 40 INJECTION, SUSPENSION INTRA-ARTICULAR; INTRAMUSCULAR at 16:17
